# Patient Record
Sex: FEMALE | Race: WHITE | NOT HISPANIC OR LATINO | Employment: OTHER | URBAN - METROPOLITAN AREA
[De-identification: names, ages, dates, MRNs, and addresses within clinical notes are randomized per-mention and may not be internally consistent; named-entity substitution may affect disease eponyms.]

---

## 2017-01-18 RX ORDER — CYCLOBENZAPRINE HCL 10 MG
TABLET ORAL
Qty: 90 TABLET | Refills: 2 | Status: SHIPPED | OUTPATIENT
Start: 2017-01-18 | End: 2017-02-07 | Stop reason: SDUPTHER

## 2017-01-18 RX ORDER — BUPRENORPHINE 10 UG/H
PATCH, EXTENDED RELEASE TRANSDERMAL
Qty: 4 PATCH | Refills: 0 | Status: SHIPPED | OUTPATIENT
Start: 2017-01-18 | End: 2017-04-11 | Stop reason: SDUPTHER

## 2017-02-07 ENCOUNTER — OFFICE VISIT (OUTPATIENT)
Dept: PAIN MEDICINE | Facility: CLINIC | Age: 47
End: 2017-02-07

## 2017-02-07 VITALS
DIASTOLIC BLOOD PRESSURE: 92 MMHG | TEMPERATURE: 98.2 F | SYSTOLIC BLOOD PRESSURE: 136 MMHG | WEIGHT: 161.8 LBS | RESPIRATION RATE: 16 BRPM | HEART RATE: 105 BPM | HEIGHT: 68 IN | BODY MASS INDEX: 24.52 KG/M2 | OXYGEN SATURATION: 100 %

## 2017-02-07 DIAGNOSIS — G89.4 CHRONIC PAIN SYNDROME: Primary | ICD-10-CM

## 2017-02-07 DIAGNOSIS — G63 B12 NEUROPATHY (HCC): ICD-10-CM

## 2017-02-07 DIAGNOSIS — G64 DISORDER OF PERIPHERAL NERVOUS SYSTEM: ICD-10-CM

## 2017-02-07 DIAGNOSIS — E53.8 B12 NEUROPATHY (HCC): ICD-10-CM

## 2017-02-07 DIAGNOSIS — Z79.899 ENCOUNTER FOR LONG-TERM (CURRENT) USE OF MEDICATIONS: ICD-10-CM

## 2017-02-07 PROCEDURE — 99213 OFFICE O/P EST LOW 20 MIN: CPT | Performed by: NURSE PRACTITIONER

## 2017-02-07 RX ORDER — AMITRIPTYLINE HYDROCHLORIDE 150 MG/1
150 TABLET, FILM COATED ORAL NIGHTLY
Qty: 30 TABLET | Refills: 5 | Status: SHIPPED | OUTPATIENT
Start: 2017-02-07 | End: 2017-08-07 | Stop reason: SDUPTHER

## 2017-02-07 RX ORDER — CYCLOBENZAPRINE HCL 10 MG
10 TABLET ORAL 2 TIMES DAILY PRN
Qty: 60 TABLET | Refills: 1 | Status: SHIPPED | OUTPATIENT
Start: 2017-02-07 | End: 2017-04-07 | Stop reason: SDUPTHER

## 2017-02-07 NOTE — PROGRESS NOTES
CHIEF COMPLAINT    Since last visit, pt's back pain is unchanged. Her butrans patch is helping to relieve the pain.    Subjective   Ana Maria Story is a 46 y.o. female  who presents to the office for follow-up.She has a history of chronic pain related to peripheral neuropathy.  Overall pain remains stable. Has now been on Butrans 10 mcg patch for two months.  She reports that it seems to be helping a lot, it keeps the pain at a tolerable level and never gets severe.  She is able to meet her functional goals at home, and has full custody of her 5 year old grandson.      Peripheral Neuropathy   This is a chronic problem. The current episode started more than 1 year ago. The problem occurs constantly. The problem has been unchanged. Associated symptoms include arthralgias, fatigue, joint swelling, myalgias, numbness and weakness. Pertinent negatives include no chest pain, chills, congestion, coughing, diaphoresis, fever, headaches, nausea, neck pain or vomiting. Nothing (cold weather) aggravates the symptoms. She has tried NSAIDs and oral narcotics (Tramadol 50 mg 2 TID, Lyrica 200 mg TID, Flexeril at HS, Elavil 150 mg HS) for the symptoms. The treatment provided moderate relief.      PEG Assessment   What number best describes your pain on average in the past week?5  What number best describes how, during the past week, pain has interfered with your enjoyment of life?5  What number best describes how, during the past week, pain has interfered with your general activity?  5    The following portions of the patient's history were reviewed and updated as appropriate: allergies, current medications, past family history, past medical history, past social history, past surgical history and problem list.    Review of Systems   Constitutional: Positive for fatigue. Negative for activity change, appetite change, chills, diaphoresis and fever.   HENT: Negative for congestion.    Respiratory: Negative for cough, shortness of  "breath and wheezing.    Cardiovascular: Negative for chest pain and palpitations.   Gastrointestinal: Positive for constipation. Negative for bowel incontinence, diarrhea, nausea and vomiting.   Genitourinary: Negative for bladder incontinence and difficulty urinating.   Musculoskeletal: Positive for arthralgias, back pain, joint swelling and myalgias. Negative for neck pain.   Neurological: Positive for weakness and numbness. Negative for dizziness, light-headedness and headaches.   Psychiatric/Behavioral: Negative for agitation, confusion, hallucinations, sleep disturbance and suicidal ideas. The patient is not nervous/anxious.        Vitals:    02/07/17 1056   BP: 136/92   Pulse: 105   Resp: 16   Temp: 98.2 °F (36.8 °C)   SpO2: 100%   Weight: 161 lb 12.8 oz (73.4 kg)   Height: 68\" (172.7 cm)   PainSc:   5   PainLoc: Generalized       Objective   Physical Exam   Constitutional: She is oriented to person, place, and time. She appears well-developed and well-nourished. She is cooperative.   HENT:   Head: Normocephalic and atraumatic.   Nose: Nose normal.   Eyes: Conjunctivae and lids are normal.   Neck: Trachea normal.   Cardiovascular: Normal rate, regular rhythm and normal heart sounds.    Pulmonary/Chest: Effort normal and breath sounds normal.   Musculoskeletal:        Lumbar back: She exhibits tenderness and pain.   Neurological: She is alert and oriented to person, place, and time. Gait normal.   Reflex Scores:       Patellar reflexes are 2+ on the right side and 2+ on the left side.  Reported dysthesia bilateral UE and LE's   Skin: Skin is warm, dry and intact.   Psychiatric: She has a normal mood and affect. Her speech is normal and behavior is normal. Judgment and thought content normal. Cognition and memory are normal.   Nursing note and vitals reviewed.    Assessment/Plan   Ana Maria was seen today for pain.    Diagnoses and all orders for this visit:    Chronic pain syndrome    Disorder of peripheral " nervous system    B12 neuropathy    Encounter for long-term (current) use of medications    Other orders  -     amitriptyline (ELAVIL) 150 MG tablet; Take 1 tablet by mouth Every Night.  -     cyclobenzaprine (FLEXERIL) 10 MG tablet; Take 1 tablet by mouth 2 (Two) Times a Day As Needed for muscle spasms.      --- Continue Butrans.  Patient appears stable with current regimen. No adverse effects. Regarding continuation of opioids, there is no evidence of aberrant behavior or any red flags.  The patient continues with appropriate response to opioid therapy. ADL's remain intact by self.   --- The urine drug screen confirmation from 12- has been reviewed and the result is appropriate based on patient history and POLLY report  --- Follow-up 2 months          POLLY REPORT    As part of the patient's treatment plan, I am prescribing controlled substances. The patient has been made aware of appropriate use of such medications, including potential risk of somnolence, limited ability to drive and/or work safely, and the potential for dependence or overdose. It has also bee made clear that these medications are for use by this patient only, without concomitant use of alcohol or other substances unless prescribed.     Patient has completed prescribing agreement detailing terms of continued prescribing of controlled substances, including monitoring POLLY reports, urine drug screening, and pill counts if necessary. The patient is aware that inappropriate use will results in cessation of prescribing such medications.    POLLY report has been reviewed and scanned into the patient's chart.    Date of last POLLY : 2-6-2017    History and physical exam exhibit continued safe and appropriate use of controlled substances.    EMR Dragon/Transcription disclaimer:   Much of this encounter note is an electronic transcription/translation of spoken language to printed text. The electronic translation of spoken language may permit  erroneous, or at times, nonsensical words or phrases to be inadvertently transcribed; Although I have reviewed the note for such errors, some may still exist.

## 2017-04-07 RX ORDER — CYCLOBENZAPRINE HCL 10 MG
TABLET ORAL
Qty: 60 TABLET | Refills: 3 | Status: SHIPPED | OUTPATIENT
Start: 2017-04-07 | End: 2017-04-11 | Stop reason: SDUPTHER

## 2017-04-11 ENCOUNTER — OFFICE VISIT (OUTPATIENT)
Dept: PAIN MEDICINE | Facility: CLINIC | Age: 47
End: 2017-04-11

## 2017-04-11 VITALS
DIASTOLIC BLOOD PRESSURE: 92 MMHG | SYSTOLIC BLOOD PRESSURE: 161 MMHG | WEIGHT: 158.6 LBS | HEIGHT: 68 IN | TEMPERATURE: 97.3 F | BODY MASS INDEX: 24.04 KG/M2 | HEART RATE: 107 BPM | RESPIRATION RATE: 16 BRPM | OXYGEN SATURATION: 100 %

## 2017-04-11 DIAGNOSIS — G64 DISORDER OF PERIPHERAL NERVOUS SYSTEM: ICD-10-CM

## 2017-04-11 DIAGNOSIS — G89.4 CHRONIC PAIN SYNDROME: Primary | ICD-10-CM

## 2017-04-11 DIAGNOSIS — M53.3 SACROILIAC DYSFUNCTION: ICD-10-CM

## 2017-04-11 DIAGNOSIS — E53.8 B12 NEUROPATHY (HCC): ICD-10-CM

## 2017-04-11 DIAGNOSIS — G63 B12 NEUROPATHY (HCC): ICD-10-CM

## 2017-04-11 DIAGNOSIS — M46.1 SACROILIITIS (HCC): ICD-10-CM

## 2017-04-11 DIAGNOSIS — Z79.899 ENCOUNTER FOR LONG-TERM (CURRENT) USE OF MEDICATIONS: ICD-10-CM

## 2017-04-11 PROCEDURE — 99214 OFFICE O/P EST MOD 30 MIN: CPT | Performed by: NURSE PRACTITIONER

## 2017-04-11 RX ORDER — CYCLOBENZAPRINE HCL 10 MG
10 TABLET ORAL 3 TIMES DAILY PRN
Qty: 60 TABLET | Refills: 3 | Status: SHIPPED | OUTPATIENT
Start: 2017-04-11 | End: 2017-06-08 | Stop reason: SDUPTHER

## 2017-04-11 RX ORDER — BUPRENORPHINE 10 UG/H
10 PATCH TRANSDERMAL WEEKLY
Qty: 4 PATCH | Refills: 0 | Status: SHIPPED | OUTPATIENT
Start: 2017-04-11 | End: 2017-06-26 | Stop reason: SINTOL

## 2017-04-11 RX ORDER — PREGABALIN 200 MG/1
200 CAPSULE ORAL 2 TIMES DAILY
Qty: 90 CAPSULE | Refills: 1 | Status: SHIPPED | OUTPATIENT
Start: 2017-04-11 | End: 2017-04-12 | Stop reason: SDUPTHER

## 2017-04-11 NOTE — PATIENT INSTRUCTIONS
Sacroiliac Joint Dysfunction  Sacroiliac joint dysfunction is a condition that causes inflammation on one or both sides of the sacroiliac (SI) joint. The SI joint connects the lower part of the spine (sacrum) with the two upper portions of the pelvis (ilium). This condition causes deep aching or burning pain in the low back. In some cases, the pain may also spread into one or both buttocks or hips or spread down the legs.  CAUSES  This condition may be caused by:  · Pregnancy. During pregnancy, extra stress is put on the SI joints because the pelvis widens.  · Injury, such as:    Car accidents.    Sport-related injuries.    Work-related injuries.  · Having one leg that is shorter than the other.  · Conditions that affect the joints, such as:    Rheumatoid arthritis.    Gout.    Psoriatic arthritis.    Joint infection (septic arthritis).  Sometimes, the cause of SI joint dysfunction is not known.  SYMPTOMS  Symptoms of this condition include:  · Aching or burning pain in the lower back. The pain may also spread to other areas, such as:    Buttocks.    Groin.    Thighs and legs.  · Muscle spasms in or around the painful areas.  · Increased pain when standing, walking, running, stair climbing, bending, or lifting.  DIAGNOSIS  Your health care provider will do a physical exam and take your medical history. During the exam, the health care provider may move one or both of your legs to different positions to check for pain. Various tests may be done to help verify the diagnosis, including:  · Imaging tests to look for other causes of pain. These may include:    MRI.    CT scan.    Bone scan.  · Diagnostic injection. A numbing medicine is injected into the SI joint using a needle. If the pain is temporarily improved or stopped after the injection, this can indicate that SI joint dysfunction is the problem.  TREATMENT  Treatment may vary depending on the cause and severity of your condition. Treatment options may  include:  · Applying ice or heat to the lower back area. This can help to reduce pain and muscle spasms.  · Medicines to relieve pain or inflammation or to relax the muscles.  · Wearing a back brace (sacroiliac brace) to help support the joint while your back is healing.  · Physical therapy to increase muscle strength around the joint and flexibility at the joint. This may also involve learning proper body positions and ways of moving to relieve stress on the joint.  · Direct manipulation of the SI joint.  · Injections of steroid medicine into the joint in order to reduce pain and swelling.  · Radiofrequency ablation to burn away nerves that are carrying pain messages from the joint.  · Use of a device that provides electrical stimulation in order to reduce pain at the joint.  · Surgery to put in screws and plates that limit or prevent joint motion. This is rare.  HOME CARE INSTRUCTIONS  · Rest as needed. Limit your activities as directed by your health care provider.  · Take medicines only as directed by your health care provider.  · If directed, apply ice to the affected area:    Put ice in a plastic bag.    Place a towel between your skin and the bag.    Leave the ice on for 20 minutes, 2-3 times per day.  · Use a heating pad or a moist heat pack as directed by your health care provider.  · Exercise as directed by your health care provider or physical therapist.  · Keep all follow-up visits as directed by your health care provider. This is important.  SEEK MEDICAL CARE IF:  · Your pain is not controlled with medicine.  · You have a fever.  · You have increasingly severe pain.  SEEK IMMEDIATE MEDICAL CARE IF:  · You have weakness, numbness, or tingling in your legs or feet.  · You lose control of your bladder or bowel.     This information is not intended to replace advice given to you by your health care provider. Make sure you discuss any questions you have with your health care provider.     Document Released:  03/16/2010 Document Revised: 05/03/2016 Document Reviewed: 08/25/2015  [employer] Interactive Patient Education ©2016 [employer]

## 2017-04-11 NOTE — PROGRESS NOTES
"CHIEF COMPLAINT    Since last visit, pt states her back pain has increased, and her feet have been bothering her more.    Subjective   Ana Maria Story is a 46 y.o. female  who presents to the office for follow-up.She has a history of peripheral neuropathy with extremity pain and back pain. Overall, her pain pattern has been relatively unchanged since her last office visit.    Complains of pain in her feet and back. Today her pain is 6/10VAS. Pain increases with weather changes/rain/cold, walking and standing; pain decreases with medication, rest. Continues with Butrans patch 10 mcg weekly, Flexeril 10 mg 3/day, Lyrica 200 mg 3/day and Elavil 150 mg nightly. Denies any side effects from the regimen. The regimen helps decrease her pain. She states the first day she puts a patch on \"it's wonderful.\"  \"It takes the edge off so that i never get into such severe pain where I'm in tears.\" ADL's by self.     The pain in her low back is starting to worsen. It is not right in the middle, but rather on either side. Pain increases with walking, standing and prolonged sitting. Has not had SI joint injections previously.     Peripheral Neuropathy   This is a chronic (today pain is 6/10VAS) problem. The current episode started more than 1 year ago. The problem occurs constantly. Progression since onset: unchanged since last office visit. Associated symptoms include arthralgias, fatigue, headaches, joint swelling, myalgias, numbness and weakness. Pertinent negatives include no chest pain, chills, congestion, coughing, diaphoresis, fever, nausea, neck pain or vomiting. Nothing (cold weather) aggravates the symptoms. She has tried NSAIDs and oral narcotics (Butrans 10 mcg patch, Lyrica 200 mg TID, Flexeril at HS, Elavil 150 mg HS) for the symptoms. The treatment provided moderate relief.   Back Pain   This is a chronic problem. The current episode started more than 1 year ago. The problem occurs 2 to 4 times per day. The problem has " been gradually worsening since onset. The pain is present in the lumbar spine, sacro-iliac and thoracic spine. The pain is at a severity of 6/10. The pain is moderate. Associated symptoms include headaches, numbness and weakness. Pertinent negatives include no bladder incontinence, bowel incontinence, chest pain or fever. She has tried analgesics and muscle relaxant for the symptoms. The treatment provided moderate relief.      PEG Assessment   What number best describes your pain on average in the past week?6  What number best describes how, during the past week, pain has interfered with your enjoyment of life?5  What number best describes how, during the past week, pain has interfered with your general activity?  5    The following portions of the patient's history were reviewed and updated as appropriate: allergies, current medications, past family history, past medical history, past social history, past surgical history and problem list.    Review of Systems   Constitutional: Positive for fatigue. Negative for activity change, appetite change, chills, diaphoresis and fever.   HENT: Negative for congestion.    Respiratory: Negative for cough, shortness of breath and wheezing.    Cardiovascular: Negative for chest pain and palpitations.   Gastrointestinal: Negative for bowel incontinence, constipation, diarrhea, nausea and vomiting.   Genitourinary: Negative for bladder incontinence and difficulty urinating.   Musculoskeletal: Positive for arthralgias, back pain, joint swelling and myalgias. Negative for neck pain.   Neurological: Positive for weakness, numbness and headaches. Negative for dizziness and light-headedness.   Psychiatric/Behavioral: Positive for sleep disturbance. Negative for agitation, confusion, hallucinations and suicidal ideas. The patient is not nervous/anxious.        Vitals:    04/11/17 1008   BP: 161/92   Pulse: 107   Resp: 16   Temp: 97.3 °F (36.3 °C)   SpO2: 100%   Weight: 158 lb 9.6 oz  "(71.9 kg)   Height: 68\" (172.7 cm)   PainSc:   6   PainLoc: Back     Objective   Physical Exam   Constitutional: She is oriented to person, place, and time. She appears well-developed and well-nourished. She is cooperative.   HENT:   Head: Normocephalic and atraumatic.   Nose: Nose normal.   Eyes: Conjunctivae and lids are normal.   Neck: Trachea normal.   Cardiovascular: Normal rate, regular rhythm and normal heart sounds.    Pulmonary/Chest: Effort normal and breath sounds normal.   Musculoskeletal:        Lumbar back: She exhibits tenderness, bony tenderness and pain.   Moderate tenderness to palpation of bilateral SI joints    VENKAT +    Negative SLR bilaterally    Neurological: She is alert and oriented to person, place, and time. Gait normal.   Reflex Scores:       Patellar reflexes are 2+ on the right side and 2+ on the left side.  Reported dysthesia bilateral UE and LE's   Skin: Skin is warm, dry and intact.   Psychiatric: She has a normal mood and affect. Her speech is normal and behavior is normal. Judgment and thought content normal. Cognition and memory are normal.   Nursing note and vitals reviewed.          Assessment/Plan   Ana Maria was seen today for back pain.    Diagnoses and all orders for this visit:    Chronic pain syndrome    Disorder of peripheral nervous system    B12 neuropathy    Encounter for long-term (current) use of medications    Sacroiliac dysfunction  -     Case Request    Sacroiliitis  -     Case Request    Other orders  -     cyclobenzaprine (FLEXERIL) 10 MG tablet; Take 1 tablet by mouth 3 (Three) Times a Day As Needed for Muscle Spasms.  -     pregabalin (LYRICA) 200 MG capsule; Take 1 capsule by mouth 2 (Two) Times a Day.  -     Buprenorphine (BUTRANS) 10 MCG/HR patch weekly; Place 10 mcg on the skin 1 (One) Time Per Week.      --- The urine drug screen confirmation from 12-13-16 has been reviewed and the result is appropriate based on patient history and POLLY report  --- " Refill Flexeril and lyrica. Patient declines refill of Butrans. Patient appears stable with current regimen. No adverse effects. Regarding continuation of opioids, there is no evidence of aberrant behavior or any red flags.  The patient continues with appropriate response to opioid therapy. ADL's remain intact by self.   --- Bilateral Si joint. No blood thinners. Reviewed the procedure at length with the patient.  Included in the review was expectations, complications, risk and benefits.The procedure was described in detail and the risks, benefits and alternatives were discussed with the patient (including but not limited to: bleeding, infection, nerve damage, worsening of pain, inability to perform injection, paralysis, seizures, and death) who agreed to proceed.  Discussed the potential for sedation if warranted/wanted.  Questions were answered and in a way the patient could understand.  Patient verbalized understanding and wishes to proceed.  This intervention will be ordered.  --- Follow-up 2 months or sooner if needed.       POLLY REPORT    As part of the patient's treatment plan, I am prescribing controlled substances. The patient has been made aware of appropriate use of such medications, including potential risk of somnolence, limited ability to drive and/or work safely, and the potential for dependence or overdose. It has also bee made clear that these medications are for use by this patient only, without concomitant use of alcohol or other substances unless prescribed.     Patient has completed prescribing agreement detailing terms of continued prescribing of controlled substances, including monitoring POLLY reports, urine drug screening, and pill counts if necessary. The patient is aware that inappropriate use will results in cessation of prescribing such medications.    POLLY report has been reviewed and scanned into the patient's chart.    Date of last POLLY : 4-10-17    History and physical exam  exhibit continued safe and appropriate use of controlled substances.      EMR Dragon/Transcription disclaimer:   Much of this encounter note is an electronic transcription/translation of spoken language to printed text. The electronic translation of spoken language may permit erroneous, or at times, nonsensical words or phrases to be inadvertently transcribed; Although I have reviewed the note for such errors, some may still exist.

## 2017-04-12 RX ORDER — PREGABALIN 200 MG/1
200 CAPSULE ORAL 3 TIMES DAILY
Qty: 90 CAPSULE | Refills: 1 | OUTPATIENT
Start: 2017-04-12 | End: 2017-06-08 | Stop reason: SDUPTHER

## 2017-04-25 ENCOUNTER — OUTSIDE FACILITY SERVICE (OUTPATIENT)
Dept: PAIN MEDICINE | Facility: CLINIC | Age: 47
End: 2017-04-25

## 2017-04-25 PROCEDURE — 27096 INJECT SACROILIAC JOINT: CPT | Performed by: ANESTHESIOLOGY

## 2017-05-23 ENCOUNTER — OUTSIDE FACILITY SERVICE (OUTPATIENT)
Dept: PAIN MEDICINE | Facility: CLINIC | Age: 47
End: 2017-05-23

## 2017-05-23 PROCEDURE — 27096 INJECT SACROILIAC JOINT: CPT | Performed by: ANESTHESIOLOGY

## 2017-06-06 ENCOUNTER — CONVERSION ENCOUNTER (OUTPATIENT)
Dept: GENERAL RADIOLOGY | Facility: HOSPITAL | Age: 47
End: 2017-06-06

## 2017-06-08 ENCOUNTER — OFFICE VISIT (OUTPATIENT)
Dept: PAIN MEDICINE | Facility: CLINIC | Age: 47
End: 2017-06-08

## 2017-06-08 VITALS
DIASTOLIC BLOOD PRESSURE: 88 MMHG | BODY MASS INDEX: 23.49 KG/M2 | WEIGHT: 155 LBS | HEIGHT: 68 IN | RESPIRATION RATE: 16 BRPM | TEMPERATURE: 97.4 F | OXYGEN SATURATION: 100 % | SYSTOLIC BLOOD PRESSURE: 152 MMHG | HEART RATE: 83 BPM

## 2017-06-08 DIAGNOSIS — E53.8 B12 NEUROPATHY (HCC): ICD-10-CM

## 2017-06-08 DIAGNOSIS — G64 DISORDER OF PERIPHERAL NERVOUS SYSTEM: ICD-10-CM

## 2017-06-08 DIAGNOSIS — M53.3 SACROILIAC JOINT DYSFUNCTION OF BOTH SIDES: ICD-10-CM

## 2017-06-08 DIAGNOSIS — G89.4 CHRONIC PAIN SYNDROME: Primary | ICD-10-CM

## 2017-06-08 DIAGNOSIS — Z79.899 ENCOUNTER FOR LONG-TERM (CURRENT) USE OF MEDICATIONS: ICD-10-CM

## 2017-06-08 DIAGNOSIS — G63 B12 NEUROPATHY (HCC): ICD-10-CM

## 2017-06-08 PROCEDURE — 99213 OFFICE O/P EST LOW 20 MIN: CPT | Performed by: NURSE PRACTITIONER

## 2017-06-08 RX ORDER — PREGABALIN 200 MG/1
200 CAPSULE ORAL 3 TIMES DAILY
Qty: 90 CAPSULE | Refills: 1 | Status: SHIPPED | OUTPATIENT
Start: 2017-06-08 | End: 2017-07-25 | Stop reason: SDUPTHER

## 2017-06-08 RX ORDER — CYCLOBENZAPRINE HCL 10 MG
10 TABLET ORAL 3 TIMES DAILY PRN
Qty: 60 TABLET | Refills: 3 | Status: SHIPPED | OUTPATIENT
Start: 2017-06-08 | End: 2017-07-25 | Stop reason: SDUPTHER

## 2017-06-08 NOTE — PROGRESS NOTES
CHIEF COMPLAINT  Pt states LBP is 50% reduced since 5/23/17 Bilat. S. I. Joint injections, #2    Subjective   Ana Maria Story is a 46 y.o. female  who presents to the office for follow-up of procedure.  She completed a bilateral SI joint injection on  4-25-17 and 5-23-17 performed by Dr. Plaza for management of low back pain, SI joint pain. Patient reports 50% relief from the procedure.  She states that the rest of her back is hurting and having some severe pain in her feet, seeing podiatrist next week.      She continues to c/o low back and bilateral leg/foot pain.  Continues with Butrans patch 10 mcg weekly, Flexeril 10 mg 3/day, Lyrica 200 mg 3/day and Elavil 150 mg nightly. Denies any side effects from the regimen. The regimen helps decrease her pain moderately.  She also uses some type of herbal topical cream.  She has not tried compounded pain cream.      Peripheral Neuropathy   This is a chronic problem. The current episode started more than 1 year ago. The problem occurs constantly. Progression since onset: unchanged since last office visit. Associated symptoms include arthralgias, fatigue, headaches, myalgias, numbness (bilat. legs,hands) and weakness (bilat. lgs). Pertinent negatives include no chest pain, chills, congestion, coughing, diaphoresis, fever, joint swelling, nausea, neck pain or vomiting. Nothing (cold weather) aggravates the symptoms. She has tried NSAIDs and oral narcotics (Butrans 10 mcg patch, Lyrica 200 mg TID, Flexeril at HS, Elavil 150 mg HS) for the symptoms. The treatment provided moderate relief.   Back Pain   This is a chronic problem. The current episode started more than 1 year ago. The problem occurs 2 to 4 times per day. The problem has been gradually worsening since onset. The pain is present in the lumbar spine, sacro-iliac and thoracic spine. The pain is at a severity of 2/10. The pain is moderate. Associated symptoms include headaches, numbness (bilat. legs,hands) and  "weakness (bilat. lgs). Pertinent negatives include no bladder incontinence, bowel incontinence, chest pain, dysuria or fever. She has tried analgesics and muscle relaxant for the symptoms. The treatment provided moderate relief.      PEG Assessment   What number best describes your pain on average in the past week?5  What number best describes how, during the past week, pain has interfered with your enjoyment of life?5  What number best describes how, during the past week, pain has interfered with your general activity?  5    The following portions of the patient's history were reviewed and updated as appropriate: allergies, current medications, past family history, past medical history, past social history, past surgical history and problem list.    Review of Systems   Constitutional: Positive for fatigue. Negative for activity change, appetite change, chills, diaphoresis and fever.   HENT: Negative for congestion.    Respiratory: Positive for apnea. Negative for cough, chest tightness, shortness of breath and wheezing.    Cardiovascular: Negative for chest pain and palpitations.   Gastrointestinal: Negative for bowel incontinence, constipation, diarrhea, nausea and vomiting.   Genitourinary: Negative for bladder incontinence, difficulty urinating and dysuria.   Musculoskeletal: Positive for arthralgias, back pain and myalgias. Negative for joint swelling and neck pain.   Neurological: Positive for weakness (bilat. lgs), numbness (bilat. legs,hands) and headaches. Negative for dizziness and light-headedness.   Psychiatric/Behavioral: Positive for sleep disturbance. Negative for agitation, confusion, hallucinations and suicidal ideas. The patient is not nervous/anxious.      Vitals:    06/08/17 1034   BP: 152/88   Pulse: 83   Resp: 16   Temp: 97.4 °F (36.3 °C)   SpO2: 100%   Weight: 155 lb (70.3 kg)   Height: 68\" (172.7 cm)   PainSc: 2  Comment: LBP bilaterally ranges from 2-8/10   PainLoc: Back     Objective "   Physical Exam   Constitutional: She is oriented to person, place, and time. She appears well-developed and well-nourished. She is cooperative.   HENT:   Head: Normocephalic and atraumatic.   Nose: Nose normal.   Eyes: Conjunctivae and lids are normal.   Neck: Trachea normal.   Cardiovascular: Normal rate.    Pulmonary/Chest: Effort normal. No respiratory distress.   Musculoskeletal:        Thoracic back: She exhibits tenderness and spasm.        Lumbar back: She exhibits tenderness.   Neurological: She is alert and oriented to person, place, and time. Gait normal.   Reported dysthesia bilateral UE and LE's   Skin: Skin is warm, dry and intact.   Psychiatric: She has a normal mood and affect. Her speech is normal and behavior is normal. Judgment and thought content normal. Cognition and memory are normal.   Nursing note and vitals reviewed.    Assessment/Plan   Ana Maria was seen today for back pain.    Diagnoses and all orders for this visit:    Chronic pain syndrome    Disorder of peripheral nervous system    B12 neuropathy    Encounter for long-term (current) use of medications    Sacroiliac joint dysfunction of both sides    Other orders  -     pregabalin (LYRICA) 200 MG capsule; Take 1 capsule by mouth 3 (Three) Times a Day.  -     cyclobenzaprine (FLEXERIL) 10 MG tablet; Take 1 tablet by mouth 3 (Three) Times a Day As Needed for Muscle Spasms.      --- Trial of a compounded pain cream  --- The urine drug screen confirmation from 12-13-16 has been reviewed and the result is appropriate based on patient history and POLLY report  --- Refill Flexeril and lyrica. Patient declines refill of Butrans. Patient appears stable with current regimen. No adverse effects. Regarding continuation of opioids, there is no evidence of aberrant behavior or any red flags. The patient continues with appropriate response to opioid therapy. ADL's remain intact by self.   --- Follow-up 2 months          POLLY REPORT  As part of the  patient's treatment plan, I am prescribing controlled substances. The patient has been made aware of appropriate use of such medications, including potential risk of somnolence, limited ability to drive and/or work safely, and the potential for dependence or overdose. It has also bee made clear that these medications are for use by this patient only, without concomitant use of alcohol or other substances unless prescribed.     Patient has completed prescribing agreement detailing terms of continued prescribing of controlled substances, including monitoring POLLY reports, urine drug screening, and pill counts if necessary. The patient is aware that inappropriate use will results in cessation of prescribing such medications.    POLLY report has been reviewed and scanned into the patient's chart.    Date of last POLLY : 6-7-2017    History and physical exam exhibit continued safe and appropriate use of controlled substances.     EMR Dragon/Transcription disclaimer:   Much of this encounter note is an electronic transcription/translation of spoken language to printed text. The electronic translation of spoken language may permit erroneous, or at times, nonsensical words or phrases to be inadvertently transcribed; Although I have reviewed the note for such errors, some may still exist.

## 2017-06-21 ENCOUNTER — TELEPHONE (OUTPATIENT)
Dept: PAIN MEDICINE | Facility: CLINIC | Age: 47
End: 2017-06-21

## 2017-06-21 NOTE — TELEPHONE ENCOUNTER
Patient called and states that she is having a reaction to the Butrans batch and is almost like a burn on her skin. She is going to send a picture of it through [employer].

## 2017-06-25 NOTE — TELEPHONE ENCOUNTER
Take off patch. Try a different patch in a different area. If no improvement, needs appointment. Thanks. catia

## 2017-06-26 ENCOUNTER — OFFICE VISIT (OUTPATIENT)
Dept: PAIN MEDICINE | Facility: CLINIC | Age: 47
End: 2017-06-26

## 2017-06-26 VITALS
DIASTOLIC BLOOD PRESSURE: 86 MMHG | HEIGHT: 68 IN | OXYGEN SATURATION: 99 % | BODY MASS INDEX: 23.46 KG/M2 | SYSTOLIC BLOOD PRESSURE: 172 MMHG | WEIGHT: 154.8 LBS | RESPIRATION RATE: 18 BRPM | HEART RATE: 107 BPM | TEMPERATURE: 97.7 F

## 2017-06-26 DIAGNOSIS — G89.4 CHRONIC PAIN SYNDROME: Primary | ICD-10-CM

## 2017-06-26 DIAGNOSIS — G63 B12 NEUROPATHY (HCC): ICD-10-CM

## 2017-06-26 DIAGNOSIS — Z79.899 ENCOUNTER FOR LONG-TERM (CURRENT) USE OF MEDICATIONS: ICD-10-CM

## 2017-06-26 DIAGNOSIS — E53.8 B12 NEUROPATHY (HCC): ICD-10-CM

## 2017-06-26 DIAGNOSIS — G64 DISORDER OF PERIPHERAL NERVOUS SYSTEM: ICD-10-CM

## 2017-06-26 PROCEDURE — 99214 OFFICE O/P EST MOD 30 MIN: CPT | Performed by: NURSE PRACTITIONER

## 2017-06-26 RX ORDER — RANITIDINE 150 MG/1
TABLET ORAL
Refills: 0 | COMMUNITY
Start: 2017-04-19 | End: 2019-10-08

## 2017-06-26 RX ORDER — TRAMADOL HYDROCHLORIDE 50 MG/1
100 TABLET ORAL EVERY 8 HOURS PRN
Qty: 180 TABLET | Refills: 0 | Status: SHIPPED | OUTPATIENT
Start: 2017-06-26 | End: 2017-07-25 | Stop reason: SDUPTHER

## 2017-06-26 RX ORDER — OMEPRAZOLE 20 MG/1
CAPSULE, DELAYED RELEASE ORAL
Refills: 0 | COMMUNITY
Start: 2017-06-11 | End: 2019-05-07 | Stop reason: SDUPTHER

## 2017-06-26 NOTE — PROGRESS NOTES
"CHIEF COMPLAINT  Ms. Story states that she started having a reaction to her Butrans patch last Wednesday. She states that she got a rash and her skin felt raw on the area her Butrans patch was located.    Subjective   Ana Maria Story is a 46 y.o. female  who presents to the office for follow-up.She has a history of chronic pain related to peripheral neuropathy and LE pain. She also has a history of low back pain. She had previously been on Butrans but started having burning and blistering under the patches. Had to take this off. Has been without pain medications for a few days.    Complains of pain in her legs. Today her pain is 7/10VAS. Describes the pain as continuous in her legs. \"I honestly don't really feel the back right now because the legs and feet are hurting so bad.\"  Has also been diagnosed with plantar fibromas. No surgical intervention at this time, will monitor. Continues with Lyrica 200 mg TID.  Denies any side effects from this. ADL's by self.     Has previously been on Tramadol. Denied any side effects from this. Is willing to re-try this when we stop Butrans.     Peripheral Neuropathy   This is a chronic (today her LE pain is 7/10VAS.) problem. The current episode started more than 1 year ago. The problem occurs constantly. Progression since onset: unchanged since last office visit. Associated symptoms include arthralgias, fatigue, headaches, myalgias, numbness (bilateral legs and feet) and weakness (bilateral legs). Pertinent negatives include no chest pain, chills, congestion, coughing, diaphoresis, fever, joint swelling, nausea, neck pain or vomiting. Nothing (cold weather) aggravates the symptoms. She has tried NSAIDs and oral narcotics (Butrans 10 mcg patch, Lyrica 200 mg TID, Flexeril at HS, Elavil 150 mg HS) for the symptoms. The treatment provided moderate relief.   Back Pain   This is a chronic problem. The current episode started more than 1 year ago. The problem occurs 2 to 4 times per " day. The problem has been gradually worsening since onset. The pain is present in the lumbar spine, sacro-iliac and thoracic spine. The pain is moderate. Associated symptoms include headaches, numbness (bilateral legs and feet) and weakness (bilateral legs). Pertinent negatives include no bladder incontinence, bowel incontinence, chest pain, dysuria or fever. She has tried analgesics and muscle relaxant for the symptoms. The treatment provided moderate relief.      PEG Assessment   What number best describes your pain on average in the past week?8  What number best describes how, during the past week, pain has interfered with your enjoyment of life?8  What number best describes how, during the past week, pain has interfered with your general activity?  8    The following portions of the patient's history were reviewed and updated as appropriate: allergies, current medications, past family history, past medical history, past social history, past surgical history and problem list.    Review of Systems   Constitutional: Positive for fatigue. Negative for chills, diaphoresis and fever.   HENT: Negative for congestion.    Eyes: Negative for visual disturbance.   Respiratory: Negative for cough, shortness of breath and wheezing.    Cardiovascular: Negative.  Negative for chest pain.   Gastrointestinal: Positive for constipation. Negative for bowel incontinence, diarrhea, nausea and vomiting.   Genitourinary: Negative for bladder incontinence, difficulty urinating and dysuria.   Musculoskeletal: Positive for arthralgias, back pain and myalgias. Negative for joint swelling and neck pain.   Neurological: Positive for weakness (bilateral legs), numbness (bilateral legs and feet) and headaches.   Psychiatric/Behavioral: Positive for sleep disturbance. Negative for suicidal ideas. The patient is not nervous/anxious.        Vitals:    06/26/17 1049   BP: 172/86   Pulse: 107   Resp: 18   Temp: 97.7 °F (36.5 °C)   SpO2: 99%  "  Weight: 154 lb 12.8 oz (70.2 kg)   Height: 68\" (172.7 cm)   PainSc:   7   PainLoc: Leg     Objective   Physical Exam   Constitutional: She is oriented to person, place, and time. She appears well-developed and well-nourished. She is cooperative.   HENT:   Head: Normocephalic and atraumatic.   Nose: Nose normal.   Eyes: Conjunctivae and lids are normal.   Neck: Trachea normal.   Cardiovascular: Normal rate, regular rhythm and normal heart sounds.    Pulmonary/Chest: Effort normal and breath sounds normal.   Musculoskeletal:        Lumbar back: She exhibits tenderness, bony tenderness and pain.   Neurological: She is alert and oriented to person, place, and time. Gait normal.   Reflex Scores:       Patellar reflexes are 2+ on the right side and 2+ on the left side.  Reported dysthesia bilateral UE and LE's   Skin: Skin is warm, dry and intact.   Psychiatric: She has a normal mood and affect. Her speech is normal and behavior is normal. Judgment and thought content normal. Cognition and memory are normal.   Nursing note and vitals reviewed.      Assessment/Plan   Ana Maria was seen today for back pain and extremity pain.    Diagnoses and all orders for this visit:    Chronic pain syndrome    B12 neuropathy    Disorder of peripheral nervous system    Encounter for long-term (current) use of medications    Other orders  -     traMADol (ULTRAM) 50 MG tablet; Take 2 tablets by mouth Every 8 (Eight) Hours As Needed for Moderate Pain (4-6).      --- The urine drug screen confirmation from 12-13-16 has been reviewed and the result is appropriate based on patient history and POLLY report  --- Discontinue Butrans secondary to side effects.  --- Trial of Tramadol 50 mg 2 q8hrs PRN. Discussed medication with the patient.  Included in this discussion was the potential for side effects and adverse events.  Patient verbalized understanding and wished to proceed.  Prescription will be given to patient.  --- Follow-up 1 month or " sooner if needed.         POLLY REPORT    As part of the patient's treatment plan, I am prescribing controlled substances. The patient has been made aware of appropriate use of such medications, including potential risk of somnolence, limited ability to drive and/or work safely, and the potential for dependence or overdose. It has also bee made clear that these medications are for use by this patient only, without concomitant use of alcohol or other substances unless prescribed.     Patient has completed prescribing agreement detailing terms of continued prescribing of controlled substances, including monitoring POLLY reports, urine drug screening, and pill counts if necessary. The patient is aware that inappropriate use will results in cessation of prescribing such medications.    POLLY report has been reviewed and scanned into the patient's chart.    Date of last POLLY : 6-26-17    History and physical exam exhibit continued safe and appropriate use of controlled substances.      EMR Dragon/Transcription disclaimer:   Much of this encounter note is an electronic transcription/translation of spoken language to printed text. The electronic translation of spoken language may permit erroneous, or at times, nonsensical words or phrases to be inadvertently transcribed; Although I have reviewed the note for such errors, some may still exist.

## 2017-07-25 ENCOUNTER — OFFICE VISIT (OUTPATIENT)
Dept: PAIN MEDICINE | Facility: CLINIC | Age: 47
End: 2017-07-25

## 2017-07-25 VITALS
WEIGHT: 155 LBS | BODY MASS INDEX: 23.49 KG/M2 | DIASTOLIC BLOOD PRESSURE: 89 MMHG | HEART RATE: 94 BPM | TEMPERATURE: 97.9 F | RESPIRATION RATE: 16 BRPM | OXYGEN SATURATION: 97 % | SYSTOLIC BLOOD PRESSURE: 147 MMHG | HEIGHT: 68 IN

## 2017-07-25 DIAGNOSIS — G89.4 CHRONIC PAIN SYNDROME: Primary | ICD-10-CM

## 2017-07-25 DIAGNOSIS — Z79.899 ENCOUNTER FOR LONG-TERM (CURRENT) USE OF MEDICATIONS: ICD-10-CM

## 2017-07-25 DIAGNOSIS — G63 B12 NEUROPATHY (HCC): ICD-10-CM

## 2017-07-25 DIAGNOSIS — E53.8 B12 NEUROPATHY (HCC): ICD-10-CM

## 2017-07-25 DIAGNOSIS — G64 DISORDER OF PERIPHERAL NERVOUS SYSTEM: ICD-10-CM

## 2017-07-25 DIAGNOSIS — M79.606 PAIN OF LOWER EXTREMITY, UNSPECIFIED LATERALITY: ICD-10-CM

## 2017-07-25 PROBLEM — M79.609 PAIN IN EXTREMITY: Status: ACTIVE | Noted: 2017-07-25

## 2017-07-25 LAB
POC AMPHETAMINES: NEGATIVE
POC BARBITURATES: NEGATIVE
POC BENZODIAZEPHINES: NEGATIVE
POC COCAINE: NEGATIVE
POC METHADONE: NEGATIVE
POC METHAMPHETAMINE SCREEN URINE: NEGATIVE
POC OPIATES: NEGATIVE
POC OXYCODONE: NEGATIVE
POC PHENCYCLIDINE: NEGATIVE
POC PROPOXYPHENE: NEGATIVE
POC THC: NEGATIVE
POC TRICYCLIC ANTIDEPRESSANTS: POSITIVE

## 2017-07-25 PROCEDURE — 99213 OFFICE O/P EST LOW 20 MIN: CPT | Performed by: NURSE PRACTITIONER

## 2017-07-25 PROCEDURE — 80305 DRUG TEST PRSMV DIR OPT OBS: CPT | Performed by: NURSE PRACTITIONER

## 2017-07-25 RX ORDER — CYCLOBENZAPRINE HCL 10 MG
10 TABLET ORAL 3 TIMES DAILY PRN
Qty: 60 TABLET | Refills: 3 | Status: SHIPPED | OUTPATIENT
Start: 2017-07-25 | End: 2017-11-22 | Stop reason: SDUPTHER

## 2017-07-25 RX ORDER — PREGABALIN 200 MG/1
200 CAPSULE ORAL 3 TIMES DAILY
Qty: 90 CAPSULE | Refills: 1 | Status: SHIPPED | OUTPATIENT
Start: 2017-07-25 | End: 2017-09-25 | Stop reason: SDUPTHER

## 2017-07-25 RX ORDER — TRAMADOL HYDROCHLORIDE 50 MG/1
100 TABLET ORAL EVERY 8 HOURS PRN
Qty: 180 TABLET | Refills: 1 | Status: SHIPPED | OUTPATIENT
Start: 2017-07-25 | End: 2017-09-25 | Stop reason: SDUPTHER

## 2017-07-25 NOTE — PROGRESS NOTES
CHIEF COMPLAINT    Since last visit on 6-26-17, pt states her back and leg pain is unchanged. She is having increased pain in left foot due to plantar fibromas. She is going to see podiatrist soon about it.     Subjective   Ana Maria Story is a 46 y.o. female  who presents to the office for follow-up.She has a history of extremity pain due to neuropathy. Also has some low back pain.  AT her last office visit, she eligio changed from Butrans to Tramadol due to side effects from Butrans(skin irritation,rash). Her skin rash is improved.  She notes her pain is not as well improved with Tramadol but could not tolerate side effects of Butrans.    Complains of pain in her legs and back. Today her pain is 5/10VAS. Describes the pain as continuous. Continues with Tramadol 50 mg 2 TID, Lyrica 200 mg TID and Flexeril 10 mg 2/day. Denies any side effects from the regimen. The regimen helps decrease her pain moderately. ADL's by self.    May be obtaining custody of grandson.    Peripheral Neuropathy   This is a chronic (today her LE pain is 5/10VAS.) problem. The current episode started more than 1 year ago. The problem occurs constantly. Progression since onset: unchanged since last office visit. Associated symptoms include arthralgias, fatigue, headaches (occasional), myalgias, numbness (bilateral legs and feet) and weakness (bilateral legs). Pertinent negatives include no chest pain, chills, congestion, coughing, diaphoresis, fever, joint swelling, nausea, neck pain or vomiting. Nothing (cold weather) aggravates the symptoms. She has tried NSAIDs and oral narcotics (Butrans 10 mcg patch, Lyrica 200 mg TID, Flexeril at HS, Elavil 150 mg HS) for the symptoms. The treatment provided moderate relief.   Back Pain   This is a chronic problem. The current episode started more than 1 year ago. The problem occurs 2 to 4 times per day. The problem has been gradually worsening since onset. The pain is present in the lumbar spine,  sacro-iliac and thoracic spine. The pain is at a severity of 5/10. The pain is moderate. Associated symptoms include headaches (occasional), numbness (bilateral legs and feet) and weakness (bilateral legs). Pertinent negatives include no bladder incontinence, bowel incontinence, chest pain, dysuria or fever. She has tried analgesics and muscle relaxant for the symptoms. The treatment provided moderate relief.      PEG Assessment   What number best describes your pain on average in the past week?5  What number best describes how, during the past week, pain has interfered with your enjoyment of life?5  What number best describes how, during the past week, pain has interfered with your general activity?  5    The following portions of the patient's history were reviewed and updated as appropriate: allergies, current medications, past family history, past medical history, past social history, past surgical history and problem list.    Review of Systems   Constitutional: Positive for fatigue. Negative for chills, diaphoresis and fever.   HENT: Negative for congestion.    Eyes: Negative for visual disturbance.   Respiratory: Negative for cough, shortness of breath and wheezing.    Cardiovascular: Negative.  Negative for chest pain.   Gastrointestinal: Positive for constipation. Negative for bowel incontinence, diarrhea, nausea and vomiting.   Genitourinary: Negative for bladder incontinence, difficulty urinating and dysuria.   Musculoskeletal: Positive for arthralgias, back pain and myalgias. Negative for joint swelling and neck pain.   Neurological: Positive for weakness (bilateral legs), numbness (bilateral legs and feet) and headaches (occasional). Negative for dizziness and light-headedness.   Psychiatric/Behavioral: Positive for sleep disturbance. Negative for agitation, confusion, hallucinations and suicidal ideas. The patient is not nervous/anxious.        Vitals:    07/25/17 1053   BP: 147/89   Pulse: 94   Resp:  "16   Temp: 97.9 °F (36.6 °C)   SpO2: 97%   Weight: 155 lb (70.3 kg)   Height: 68\" (172.7 cm)   PainSc:   5   PainLoc: Back  Comment: bilateral legs, l>r       Objective   Physical Exam   Constitutional: She is oriented to person, place, and time. She appears well-developed and well-nourished. She is cooperative.   HENT:   Head: Normocephalic and atraumatic.   Nose: Nose normal.   Eyes: Conjunctivae and lids are normal.   Neck: Trachea normal.   Cardiovascular: Normal rate, regular rhythm and normal heart sounds.    Pulmonary/Chest: Effort normal and breath sounds normal.   Musculoskeletal:        Lumbar back: She exhibits tenderness, bony tenderness and pain.   Neurological: She is alert and oriented to person, place, and time. Gait normal.   Reflex Scores:       Patellar reflexes are 2+ on the right side and 2+ on the left side.  Reported dysthesia bilateral UE and LE's   Skin: Skin is warm, dry and intact.   Psychiatric: She has a normal mood and affect. Her speech is normal and behavior is normal. Judgment and thought content normal. Cognition and memory are normal.   Nursing note and vitals reviewed.      Assessment/Plan   Ana Maria was seen today for back pain and extremity pain.    Diagnoses and all orders for this visit:    Chronic pain syndrome  -     Urine Drug Screen Confirmation  -     POC Urine Drug Screen, Triage    Disorder of peripheral nervous system  -     Urine Drug Screen Confirmation  -     POC Urine Drug Screen, Triage    B12 neuropathy  -     Urine Drug Screen Confirmation  -     POC Urine Drug Screen, Triage    Pain of lower extremity, unspecified laterality  -     Urine Drug Screen Confirmation  -     POC Urine Drug Screen, Triage    Encounter for long-term (current) use of medications  -     Urine Drug Screen Confirmation  -     POC Urine Drug Screen, Triage    Other orders  -     traMADol (ULTRAM) 50 MG tablet; Take 2 tablets by mouth Every 8 (Eight) Hours As Needed for Moderate Pain " (4-6).  -     cyclobenzaprine (FLEXERIL) 10 MG tablet; Take 1 tablet by mouth 3 (Three) Times a Day As Needed for Muscle Spasms.  -     pregabalin (LYRICA) 200 MG capsule; Take 1 capsule by mouth 3 (Three) Times a Day.      --- Routine UDS in office today as part of monitoring requirements for controlled substances.  The specimen was viewed and the immunoassay result reviewed and is +TCA(APPROPRIATE).  [facility] confirmation.     --- Refill Tramadol, Lyrica and Flexeril with 1 additional refill  --- Follow-up 2 months or sooner if needed.       POLLY REPORT    As part of the patient's treatment plan, I am prescribing controlled substances. The patient has been made aware of appropriate use of such medications, including potential risk of somnolence, limited ability to drive and/or work safely, and the potential for dependence or overdose. It has also bee made clear that these medications are for use by this patient only, without concomitant use of alcohol or other substances unless prescribed.     Patient has completed prescribing agreement detailing terms of continued prescribing of controlled substances, including monitoring POLLY reports, urine drug screening, and pill counts if necessary. The patient is aware that inappropriate use will results in cessation of prescribing such medications.    POLLY report has been reviewed and scanned into the patient's chart.    Date of last POLLY : 7-22-17    History and physical exam exhibit continued safe and appropriate use of controlled substances.      EMR Dragon/Transcription disclaimer:   Much of this encounter note is an electronic transcription/translation of spoken language to printed text. The electronic translation of spoken language may permit erroneous, or at times, nonsensical words or phrases to be inadvertently transcribed; Although I have reviewed the note for such errors, some may still exist.

## 2017-08-07 RX ORDER — AMITRIPTYLINE HYDROCHLORIDE 150 MG/1
TABLET, FILM COATED ORAL
Qty: 30 TABLET | Refills: 5 | Status: SHIPPED | OUTPATIENT
Start: 2017-08-07 | End: 2017-09-25 | Stop reason: SDUPTHER

## 2017-09-25 ENCOUNTER — OFFICE VISIT (OUTPATIENT)
Dept: PAIN MEDICINE | Facility: CLINIC | Age: 47
End: 2017-09-25

## 2017-09-25 VITALS
TEMPERATURE: 97.8 F | BODY MASS INDEX: 23.01 KG/M2 | WEIGHT: 151.8 LBS | HEART RATE: 101 BPM | DIASTOLIC BLOOD PRESSURE: 91 MMHG | HEIGHT: 68 IN | OXYGEN SATURATION: 97 % | SYSTOLIC BLOOD PRESSURE: 124 MMHG | RESPIRATION RATE: 16 BRPM

## 2017-09-25 DIAGNOSIS — M53.3 SACROILIAC JOINT DYSFUNCTION OF BOTH SIDES: ICD-10-CM

## 2017-09-25 DIAGNOSIS — Z79.899 ENCOUNTER FOR LONG-TERM (CURRENT) USE OF MEDICATIONS: ICD-10-CM

## 2017-09-25 DIAGNOSIS — M79.606 PAIN OF LOWER EXTREMITY, UNSPECIFIED LATERALITY: ICD-10-CM

## 2017-09-25 DIAGNOSIS — E53.8 B12 NEUROPATHY (HCC): ICD-10-CM

## 2017-09-25 DIAGNOSIS — G89.4 CHRONIC PAIN SYNDROME: Primary | ICD-10-CM

## 2017-09-25 DIAGNOSIS — G63 B12 NEUROPATHY (HCC): ICD-10-CM

## 2017-09-25 PROCEDURE — 99214 OFFICE O/P EST MOD 30 MIN: CPT | Performed by: NURSE PRACTITIONER

## 2017-09-25 RX ORDER — AMOXICILLIN 875 MG/1
TABLET, COATED ORAL
Refills: 0 | COMMUNITY
Start: 2017-09-23 | End: 2018-05-23

## 2017-09-25 RX ORDER — TRAMADOL HYDROCHLORIDE 50 MG/1
100 TABLET ORAL EVERY 8 HOURS PRN
Qty: 180 TABLET | Refills: 1 | Status: SHIPPED | OUTPATIENT
Start: 2017-09-25 | End: 2017-11-22 | Stop reason: SDUPTHER

## 2017-09-25 RX ORDER — PREGABALIN 200 MG/1
200 CAPSULE ORAL 3 TIMES DAILY
Qty: 90 CAPSULE | Refills: 1 | Status: SHIPPED | OUTPATIENT
Start: 2017-09-25 | End: 2017-11-22 | Stop reason: SDUPTHER

## 2017-09-25 RX ORDER — AMITRIPTYLINE HYDROCHLORIDE 150 MG/1
150 TABLET, FILM COATED ORAL NIGHTLY
Qty: 30 TABLET | Refills: 5 | Status: ON HOLD | OUTPATIENT
Start: 2017-09-25 | End: 2021-06-30

## 2017-09-25 RX ORDER — MECLIZINE HCL 12.5 MG/1
TABLET ORAL
Refills: 0 | Status: ON HOLD | COMMUNITY
Start: 2017-09-23 | End: 2021-11-03

## 2017-09-25 RX ORDER — FLUTICASONE PROPIONATE 50 MCG
SPRAY, SUSPENSION (ML) NASAL
Refills: 0 | COMMUNITY
Start: 2017-09-23 | End: 2019-10-08

## 2017-09-25 NOTE — PROGRESS NOTES
"CHIEF COMPLAINT    F/U back and extremity pain last visit 7-25-17. States her feet and back pain has increased. States the numbness in her legs has increased and she can tell because she has chigger bites that do not itch or bother her. Currently has ear infection in both ears and on abx-so states experiencing mild ear pain.     Subjective   Ana Maria Story is a 46 y.o. female  who presents to the office for follow-up.She has a history of chronic extremity pain due to neuropathy, as well as back pain. Her feet pain is worse. Is seeing a podiatrist. Trying new medications. Sees Dr. Jackson(podiatrist) in a few weeks.  Has a history of fibromas.    Complains of pain in her back and feet. Today her pain is 5/10VAS. Describes the pain as continuous and worse. Continues with Tramadol 50 mg 2 TID, Lyrica 200 mg TID and Flexeril 10 mg 2/day. Denies any side effects from the regimen. The regimen helps decrease her pain by 25-50%. \"It takes the edge off.\"  ADL's by self.    Has previously bilateral Si joint injections with good response. Has not had since may 2017.    Peripheral Neuropathy   This is a chronic (today her LE pain is 5/10VAS.) problem. The current episode started more than 1 year ago. The problem occurs constantly. Progression since onset: unchanged since last office visit. Associated symptoms include arthralgias, fatigue, headaches (occasional), myalgias, numbness (bilateral legs and feet) and weakness (bilateral legs). Pertinent negatives include no chest pain, chills, congestion, coughing, diaphoresis, fever, joint swelling, nausea, neck pain or vomiting. Nothing (cold weather) aggravates the symptoms. She has tried NSAIDs and oral narcotics (Butrans 10 mcg patch, Lyrica 200 mg TID, Flexeril at HS, Elavil 150 mg HS) for the symptoms. The treatment provided moderate relief.   Back Pain   This is a chronic problem. The current episode started more than 1 year ago. The problem occurs 2 to 4 times per day. The " problem has been gradually worsening since onset. The pain is present in the lumbar spine, sacro-iliac and thoracic spine. The pain is at a severity of 5/10. The pain is moderate. Associated symptoms include headaches (occasional), numbness (bilateral legs and feet) and weakness (bilateral legs). Pertinent negatives include no bladder incontinence, bowel incontinence, chest pain, dysuria or fever. She has tried analgesics and muscle relaxant for the symptoms. The treatment provided moderate relief.        PEG Assessment   What number best describes your pain on average in the past week?6  What number best describes how, during the past week, pain has interfered with your enjoyment of life?7  What number best describes how, during the past week, pain has interfered with your general activity?  7    The following portions of the patient's history were reviewed and updated as appropriate: allergies, current medications, past family history, past medical history, past social history, past surgical history and problem list.    Review of Systems   Constitutional: Positive for fatigue. Negative for chills, diaphoresis and fever.   HENT: Negative for congestion.    Eyes: Negative for visual disturbance.   Respiratory: Negative for cough, shortness of breath and wheezing.    Cardiovascular: Negative.  Negative for chest pain.   Gastrointestinal: Positive for constipation. Negative for bowel incontinence, diarrhea, nausea and vomiting.   Genitourinary: Negative for bladder incontinence, difficulty urinating and dysuria.   Musculoskeletal: Positive for arthralgias, back pain and myalgias. Negative for joint swelling and neck pain.   Neurological: Positive for weakness (bilateral legs), numbness (bilateral legs and feet) and headaches (occasional). Negative for dizziness and light-headedness.   Psychiatric/Behavioral: Positive for sleep disturbance. Negative for agitation, confusion, hallucinations and suicidal ideas. The  "patient is not nervous/anxious.        Vitals:    09/25/17 1049   BP: 124/91   Pulse: 101   Resp: 16   Temp: 97.8 °F (36.6 °C)   SpO2: 97%   Weight: 151 lb 12.8 oz (68.9 kg)   Height: 68\" (172.7 cm)   PainSc:   5   PainLoc: Back     Objective   Physical Exam   Constitutional: She is oriented to person, place, and time. She appears well-developed and well-nourished. She is cooperative.   HENT:   Head: Normocephalic and atraumatic.   Nose: Nose normal.   Eyes: Conjunctivae and lids are normal.   Neck: Trachea normal.   Cardiovascular: Normal rate, regular rhythm and normal heart sounds.    Pulmonary/Chest: Effort normal and breath sounds normal.   Musculoskeletal:        Lumbar back: She exhibits tenderness, bony tenderness and pain.   + bilateral Si joint tenderness    + VENKAT bilaterally   Neurological: She is alert and oriented to person, place, and time. Gait normal.   Reflex Scores:       Patellar reflexes are 2+ on the right side and 2+ on the left side.  Reported dysthesia bilateral UE and LE's   Skin: Skin is warm, dry and intact.   Psychiatric: She has a normal mood and affect. Her speech is normal and behavior is normal. Judgment and thought content normal. Cognition and memory are normal.   Nursing note and vitals reviewed.      Assessment/Plan   Ana Maria was seen today for back pain and extremity pain.    Diagnoses and all orders for this visit:    Chronic pain syndrome    Pain of lower extremity, unspecified laterality    B12 neuropathy    Encounter for long-term (current) use of medications    Sacroiliac joint dysfunction of both sides  -     Case Request    Other orders  -     traMADol (ULTRAM) 50 MG tablet; Take 2 tablets by mouth Every 8 (Eight) Hours As Needed for Moderate Pain .  -     pregabalin (LYRICA) 200 MG capsule; Take 1 capsule by mouth 3 (Three) Times a Day.  -     amitriptyline (ELAVIL) 150 MG tablet; Take 1 tablet by mouth Every Night.      --- The urine drug screen confirmation from " 7-25-17 has been reviewed and the result is appropriate based on patient history and POLLY report  --- Refill Tramadol and Lyrica. Patient appears stable with current regimen. No adverse effects. Regarding continuation of opioids, there is no evidence of aberrant behavior or any red flags.  The patient continues with appropriate response to opioid therapy. ADL's remain intact by self.   --- bilateral Si joint injections. No blood thinners. Reviewed the procedure at length with the patient.  Included in the review was expectations, complications, risk and benefits.The procedure was described in detail and the risks, benefits and alternatives were discussed with the patient (including but not limited to: bleeding, infection, nerve damage, worsening of pain, inability to perform injection, paralysis, seizures, and death) who agreed to proceed.  Discussed the potential for sedation if warranted/wanted.  Questions were answered and in a way the patient could understand.  Patient verbalized understanding and wishes to proceed.  This intervention will be ordered.  --- Follow-up 2 months or sooner if needed.       POLLY REPORT    As part of the patient's treatment plan, I am prescribing controlled substances. The patient has been made aware of appropriate use of such medications, including potential risk of somnolence, limited ability to drive and/or work safely, and the potential for dependence or overdose. It has also bee made clear that these medications are for use by this patient only, without concomitant use of alcohol or other substances unless prescribed.     Patient has completed prescribing agreement detailing terms of continued prescribing of controlled substances, including monitoring POLLY reports, urine drug screening, and pill counts if necessary. The patient is aware that inappropriate use will results in cessation of prescribing such medications.    POLLY report has been reviewed and scanned into the  patient's chart.    Date of last POLLY : 9-22-17    History and physical exam exhibit continued safe and appropriate use of controlled substances.      EMR Dragon/Transcription disclaimer:   Much of this encounter note is an electronic transcription/translation of spoken language to printed text. The electronic translation of spoken language may permit erroneous, or at times, nonsensical words or phrases to be inadvertently transcribed; Although I have reviewed the note for such errors, some may still exist.

## 2017-10-10 ENCOUNTER — OUTSIDE FACILITY SERVICE (OUTPATIENT)
Dept: PAIN MEDICINE | Facility: CLINIC | Age: 47
End: 2017-10-10

## 2017-10-10 ENCOUNTER — DOCUMENTATION (OUTPATIENT)
Dept: PAIN MEDICINE | Facility: CLINIC | Age: 47
End: 2017-10-10

## 2017-10-10 PROCEDURE — 27096 INJECT SACROILIAC JOINT: CPT | Performed by: ANESTHESIOLOGY

## 2017-10-10 NOTE — PROGRESS NOTES
Bilateral Sacroiliac Joint Injection  [facility]      PREOPERATIVE DIAGNOSIS:   Sacroiliac joint dysfunction, bilateral    POSTOPERATIVE DIAGNOSIS:  Sacroiliac joint dysfunction, bilateral    PROCEDURE:  Sacroiliac Joint Injection, Bilaterally, with fluoroscopic guidance    PRE-PROCEDURE DISCUSSION WITH PATIENT:    Risks and complications were discussed with the patient prior to starting the procedure and informed consent was obtained.  We discussed various topics including but not limited to bleeding, infection, injury, postprocedural site soreness, painful flareup, worsening of clinical picture, paralysis, coma, and death.     SURGEON:  Epifanio Plaza MD    REASON FOR PROCEDURE:    Patient has pain consistent with SI pathology on history and physical exam. Previous clinically significant therapeutic effect of SI joint injection.      SEDATION:  Patient declined administration of moderate sedation    ANESTHETIC AGENT:  Marcaine 0.5%  STEROID AGENT:  80mg DepoMedrol    DESCRIPTON OF PROCEDURE:  After obtaining informed consent, IV access was not obtained in the preoperative area.  The patient was transported to the operative suite and placed in the prone position with a pillow under the pelvic area. EKG, blood pressure, and pulse oximeter were monitored. The lumbosacral area was prepped with Chloraprep and draped in a sterile fashion. Under fluoroscopic guidance the inferior most portion of the right SI joint was identified. The overlying skin and subcutaneous tissue was anesthetized with 1% lidocaine. A 22-gauge spinal needle was introduced from the inferior most portion of the joint into the RIGHT SI joint under fluoroscopic guidance in the AP dimension with slight oblique rotation to the contralateral side.  Aspiration was negative.  After confirming the position of the needle with fluoroscopy, 1 mL of Omnipaque was injected and after seeing appropriate spread into the joint a total of 2mL  of 0.5% Marcaine, with approximately 40 mg of DepoMedrol, was injected very slowly.  Needle was removed intact.  A similar procedure was performed on the LEFT side.   Vital signs remained stable.  The onset of analgesia was noted.      ESTIMATED BLOOD LOSS:  minimal  SPECIMENS:  None  COMPLICATIONS:  No complications were noted. and There was no indication of vascular uptake on live injection of contrast dye.    TOLERANCE & DISCHARGE CONDITION:    The patient tolerated the procedure well.  The patient was transported to the recovery area without difficulties.  The patient was discharged to home under the care of family in stable and satisfactory condition.    PLAN OF CARE:  1. The patient was given our standard instruction sheet and will resume all medications as per the medication reconciliation sheet.  2. The patient will Repeat injection in 2 months PRN.  3. The patient is instructed to keep a pain log hourly for 8 hours after the procedure.

## 2017-11-22 ENCOUNTER — OFFICE VISIT (OUTPATIENT)
Dept: PAIN MEDICINE | Facility: CLINIC | Age: 47
End: 2017-11-22

## 2017-11-22 VITALS
HEIGHT: 68 IN | HEART RATE: 101 BPM | SYSTOLIC BLOOD PRESSURE: 150 MMHG | OXYGEN SATURATION: 98 % | WEIGHT: 155.6 LBS | BODY MASS INDEX: 23.58 KG/M2 | DIASTOLIC BLOOD PRESSURE: 85 MMHG | TEMPERATURE: 96.6 F | RESPIRATION RATE: 16 BRPM

## 2017-11-22 DIAGNOSIS — M79.606 PAIN OF LOWER EXTREMITY, UNSPECIFIED LATERALITY: ICD-10-CM

## 2017-11-22 DIAGNOSIS — E53.8 B12 NEUROPATHY (HCC): ICD-10-CM

## 2017-11-22 DIAGNOSIS — G89.4 CHRONIC PAIN SYNDROME: Primary | ICD-10-CM

## 2017-11-22 DIAGNOSIS — G63 B12 NEUROPATHY (HCC): ICD-10-CM

## 2017-11-22 DIAGNOSIS — M53.3 SI (SACROILIAC) JOINT DYSFUNCTION: ICD-10-CM

## 2017-11-22 PROCEDURE — 99213 OFFICE O/P EST LOW 20 MIN: CPT | Performed by: NURSE PRACTITIONER

## 2017-11-22 RX ORDER — TRAMADOL HYDROCHLORIDE 50 MG/1
100 TABLET ORAL EVERY 8 HOURS PRN
Qty: 180 TABLET | Refills: 1 | Status: SHIPPED | OUTPATIENT
Start: 2017-11-22 | End: 2018-01-18 | Stop reason: SDUPTHER

## 2017-11-22 RX ORDER — CYCLOBENZAPRINE HCL 10 MG
10 TABLET ORAL 3 TIMES DAILY PRN
Qty: 60 TABLET | Refills: 3 | Status: SHIPPED | OUTPATIENT
Start: 2017-11-22 | End: 2018-03-06 | Stop reason: SDUPTHER

## 2017-11-22 RX ORDER — PREGABALIN 200 MG/1
200 CAPSULE ORAL 3 TIMES DAILY
Qty: 90 CAPSULE | Refills: 1 | Status: SHIPPED | OUTPATIENT
Start: 2017-11-22 | End: 2018-02-15 | Stop reason: SDUPTHER

## 2017-11-22 NOTE — PROGRESS NOTES
"CHIEF COMPLAINT    F/U back and leg pain. Injection helped her back pain. States she is having more numbness in fingers but does not want to go back to the doctor. She states she hasn't had a h/a in a month.    Subjective   Ana Maria Story is a 47 y.o. female  who presents to the office for follow-up of procedure.  She completed a Bilateral SI joint injection   on  10-10-17 performed by Dr. Plaza for management of back pain. Patient reports 50% ongoing relief from the procedure.  \"each time I've gotten the shots, it's helped more each time.\" \"I could notice a big difference in my pain this time.\"    Her back pain is improved since last office visit. Complains of increased numbness in fingers, as well as pain in legs.    Complains of pain in her back, fingers and legs. Today her pain is 4/10VAS. Describes the back pain as nearly continuous and improved, but describes the numbness in hands as continuous. Has not had any further headaches since last injection. . Continues with Tramadol 50 mg 2 TID, Lyrica 200 mg TID and Flexeril 10 mg 1-2/day (always takes at night). Denies any side effects from the regimen. The regimen helps decrease her pain by \"close to 50%. \"\" Notes improved pain control since injection.  ADL's by self.    Peripheral Neuropathy   This is a chronic (today her LE pain is 4/10VAS.) problem. The current episode started more than 1 year ago. The problem occurs constantly. Progression since onset: worse since last office visit. Associated symptoms include arthralgias, fatigue, myalgias, numbness (bilateral legs and feet and hands) and weakness (bilateral legs). Pertinent negatives include no chest pain, chills, congestion, coughing, diaphoresis, fever, headaches (occasional), joint swelling, nausea, neck pain or vomiting. Nothing (cold weather) aggravates the symptoms. She has tried NSAIDs and oral narcotics (Butrans 10 mcg patch, Lyrica 200 mg TID, Flexeril at HS, Elavil 150 mg HS) for the symptoms. " The treatment provided moderate relief.   Back Pain   This is a chronic problem. The current episode started more than 1 year ago. The problem occurs 2 to 4 times per day. Progression since onset: improved since last office visit. The pain is present in the lumbar spine, sacro-iliac and thoracic spine. The pain is at a severity of 4/10. The pain is moderate. Associated symptoms include numbness (bilateral legs and feet and hands) and weakness (bilateral legs). Pertinent negatives include no bladder incontinence, bowel incontinence, chest pain, dysuria, fever or headaches (occasional). She has tried analgesics and muscle relaxant (SI injection--moderate to signficant relief) for the symptoms. The treatment provided moderate relief.      PEG Assessment   What number best describes your pain on average in the past week?6  What number best describes how, during the past week, pain has interfered with your enjoyment of life?7  What number best describes how, during the past week, pain has interfered with your general activity?  7    The following portions of the patient's history were reviewed and updated as appropriate: allergies, current medications, past family history, past medical history, past social history, past surgical history and problem list.    Review of Systems   Constitutional: Positive for fatigue. Negative for chills, diaphoresis and fever.   HENT: Negative for congestion.    Eyes: Negative for visual disturbance.   Respiratory: Negative for cough, shortness of breath and wheezing.    Cardiovascular: Negative.  Negative for chest pain.   Gastrointestinal: Positive for constipation. Negative for bowel incontinence, diarrhea, nausea and vomiting.   Genitourinary: Negative for bladder incontinence, difficulty urinating and dysuria.   Musculoskeletal: Positive for arthralgias, back pain and myalgias. Negative for joint swelling and neck pain.   Neurological: Positive for weakness (bilateral legs) and  "numbness (bilateral legs and feet and hands). Negative for dizziness, light-headedness and headaches (occasional).   Psychiatric/Behavioral: Positive for sleep disturbance. Negative for agitation, confusion, hallucinations and suicidal ideas. The patient is not nervous/anxious.        Vitals:    11/22/17 1056   BP: 150/85   Pulse: 101   Resp: 16   Temp: 96.6 °F (35.9 °C)   SpO2: 98%   Weight: 155 lb 9.6 oz (70.6 kg)   Height: 68\" (172.7 cm)   PainSc:   4   PainLoc: Back     Objective   Physical Exam   Constitutional: She is oriented to person, place, and time. She appears well-developed and well-nourished. She is cooperative.   HENT:   Head: Normocephalic and atraumatic.   Nose: Nose normal.   Eyes: Conjunctivae and lids are normal.   Neck: Trachea normal.   Cardiovascular: Normal rate.    Pulmonary/Chest: Effort normal. No respiratory distress.   Neurological: She is alert and oriented to person, place, and time. Gait normal.   Reported dysthesia bilateral fingers(ALL)   Skin: Skin is warm, dry and intact.   Psychiatric: She has a normal mood and affect. Her speech is normal and behavior is normal. Judgment and thought content normal. Cognition and memory are normal.   Nursing note and vitals reviewed.      Assessment/Plan   Ana Maria was seen today for back pain and extremity pain.    Diagnoses and all orders for this visit:    Chronic pain syndrome    Pain of lower extremity, unspecified laterality    B12 neuropathy    SI (sacroiliac) joint dysfunction    Other orders  -     traMADol (ULTRAM) 50 MG tablet; Take 2 tablets by mouth Every 8 (Eight) Hours As Needed for Moderate Pain .  -     pregabalin (LYRICA) 200 MG capsule; Take 1 capsule by mouth 3 (Three) Times a Day.  -     cyclobenzaprine (FLEXERIL) 10 MG tablet; Take 1 tablet by mouth 3 (Three) Times a Day As Needed for Muscle Spasms.        --- The urine drug screen confirmation from 7-25-17 has been reviewed and the result is appropriate based on patient " history and POLLY report  --- Refill Tramadol with 1 additional refill. Patient appears stable with current regimen. No adverse effects. Regarding continuation of opioids, there is no evidence of aberrant behavior or any red flags.  The patient continues with appropriate response to opioid therapy. ADL's remain intact by self.   --- Discussed cervical xray and EMG--patient wants to wait at this time.   --- Follow-up 2 months or sooner if needed.       POLLY REPORT    As part of the patient's treatment plan, I am prescribing controlled substances. The patient has been made aware of appropriate use of such medications, including potential risk of somnolence, limited ability to drive and/or work safely, and the potential for dependence or overdose. It has also bee made clear that these medications are for use by this patient only, without concomitant use of alcohol or other substances unless prescribed.     Patient has completed prescribing agreement detailing terms of continued prescribing of controlled substances, including monitoring POLLY reports, urine drug screening, and pill counts if necessary. The patient is aware that inappropriate use will results in cessation of prescribing such medications.    POLLY report has been reviewed and scanned into the patient's chart.    Date of last POLLY : 11-20-17    History and physical exam exhibit continued safe and appropriate use of controlled substances.       EMR Dragon/Transcription disclaimer:   Much of this encounter note is an electronic transcription/translation of spoken language to printed text. The electronic translation of spoken language may permit erroneous, or at times, nonsensical words or phrases to be inadvertently transcribed; Although I have reviewed the note for such errors, some may still exist.

## 2018-01-18 ENCOUNTER — OFFICE VISIT (OUTPATIENT)
Dept: PAIN MEDICINE | Facility: CLINIC | Age: 48
End: 2018-01-18

## 2018-01-18 VITALS
DIASTOLIC BLOOD PRESSURE: 75 MMHG | HEIGHT: 68 IN | OXYGEN SATURATION: 100 % | WEIGHT: 154.6 LBS | HEART RATE: 106 BPM | TEMPERATURE: 97.9 F | SYSTOLIC BLOOD PRESSURE: 121 MMHG | RESPIRATION RATE: 18 BRPM | BODY MASS INDEX: 23.43 KG/M2

## 2018-01-18 DIAGNOSIS — Z79.899 ENCOUNTER FOR LONG-TERM (CURRENT) USE OF MEDICATIONS: ICD-10-CM

## 2018-01-18 DIAGNOSIS — M79.606 PAIN OF LOWER EXTREMITY, UNSPECIFIED LATERALITY: ICD-10-CM

## 2018-01-18 DIAGNOSIS — G89.4 CHRONIC PAIN SYNDROME: Primary | ICD-10-CM

## 2018-01-18 DIAGNOSIS — G64 DISORDER OF PERIPHERAL NERVOUS SYSTEM: ICD-10-CM

## 2018-01-18 DIAGNOSIS — M53.3 SI (SACROILIAC) JOINT DYSFUNCTION: ICD-10-CM

## 2018-01-18 DIAGNOSIS — M53.3 SACROILIAC JOINT DYSFUNCTION OF BOTH SIDES: ICD-10-CM

## 2018-01-18 PROCEDURE — 99214 OFFICE O/P EST MOD 30 MIN: CPT | Performed by: NURSE PRACTITIONER

## 2018-01-18 RX ORDER — TRAMADOL HYDROCHLORIDE 50 MG/1
100 TABLET ORAL EVERY 8 HOURS PRN
Qty: 180 TABLET | Refills: 1 | Status: SHIPPED | OUTPATIENT
Start: 2018-01-18 | End: 2018-03-22 | Stop reason: SDUPTHER

## 2018-01-18 NOTE — PROGRESS NOTES
CHIEF COMPLAINT  Follow-up for back and exteremity pain. Ms. Story states that her pain has increased since her last appt.    Subjective   Ana Maria Story is a 47 y.o. female  who presents to the office for follow-up.She has a history of chronic back pain and extremity pain due to neuropathy. Her back pain is worse and she wants to repeat SI joint injections.  Is also noticing her extremity pain is worse due to cold weather.    Complains of pain in her low back and legs. Today her pain is 7/10VAS. Describes the pain as continuous and worse.Continues with Tramadol 50 mg 2 TID, Lyrica 200 mg TID and Flexeril 10 mg 1-2/day (always takes at night). Denies any side effects from the regimen. The regimen helps decrease her pain by 25-50%.Pain is not as well controlled since she needs shot and it is also cold. ADL's by self. Has custody of grandson.    Previous Si joint injection improved her pain 75% relief for 2-3 months.    Is using [place] essential oils. Notes this helps her leg pain.      Peripheral Neuropathy   This is a chronic (today her LE pain is 7/10VAS.) problem. The current episode started more than 1 year ago. The problem occurs constantly. Progression since onset: worse since last office visit. Associated symptoms include arthralgias, coughing, fatigue, myalgias, numbness and weakness. Pertinent negatives include no chest pain, chills, congestion, diaphoresis, fever, headaches (occasional), joint swelling, nausea, neck pain or vomiting. Nothing (cold weather) aggravates the symptoms. She has tried NSAIDs and oral narcotics (Butrans 10 mcg patch, Lyrica 200 mg TID, Flexeril at HS, Elavil 150 mg HS) for the symptoms. The treatment provided moderate relief.   Back Pain   This is a chronic problem. The current episode started more than 1 year ago. The problem occurs constantly. Progression since onset: worsesince last office visit. The pain is present in the lumbar spine, sacro-iliac and thoracic spine.  "The pain is at a severity of 7/10. The pain is moderate. Associated symptoms include numbness and weakness. Pertinent negatives include no bladder incontinence, bowel incontinence, chest pain, dysuria, fever or headaches (occasional). She has tried analgesics and muscle relaxant (SI injection--moderate to signficant relief) for the symptoms. The treatment provided moderate relief.        PEG Assessment   What number best describes your pain on average in the past week?7  What number best describes how, during the past week, pain has interfered with your enjoyment of life?8  What number best describes how, during the past week, pain has interfered with your general activity?  8    The following portions of the patient's history were reviewed and updated as appropriate: allergies, current medications, past family history, past medical history, past social history, past surgical history and problem list.    Review of Systems   Constitutional: Positive for fatigue. Negative for chills, diaphoresis and fever.   HENT: Negative for congestion.    Eyes: Negative for visual disturbance.   Respiratory: Positive for cough. Negative for shortness of breath and wheezing.    Cardiovascular: Negative.  Negative for chest pain.   Gastrointestinal: Negative for bowel incontinence, constipation, diarrhea, nausea and vomiting.   Genitourinary: Negative for bladder incontinence, difficulty urinating and dysuria.   Musculoskeletal: Positive for arthralgias, back pain and myalgias. Negative for joint swelling and neck pain.        Bilateral leg and foot pain   Neurological: Positive for weakness and numbness. Negative for headaches (occasional).   Psychiatric/Behavioral: Positive for sleep disturbance. Negative for suicidal ideas. The patient is not nervous/anxious.        Vitals:    01/18/18 1043   BP: 121/75   Pulse: 106   Resp: 18   Temp: 97.9 °F (36.6 °C)   SpO2: 100%   Weight: 70.1 kg (154 lb 9.6 oz)   Height: 172.7 cm (68\") "   PainSc:   7   PainLoc: Generalized     Objective   Physical Exam   Constitutional: She is oriented to person, place, and time. She appears well-developed and well-nourished. She is cooperative.   HENT:   Head: Normocephalic and atraumatic.   Nose: Nose normal.   Eyes: Conjunctivae and lids are normal.   Neck: Trachea normal.   Cardiovascular: Normal rate.    Pulmonary/Chest: Effort normal.   Musculoskeletal:        Lumbar back: She exhibits tenderness and pain.   Moderate tenderness of bilateral SI joints    + VENKAT bilaterally    NEGATIVE SLR Bilaterally   Neurological: She is alert and oriented to person, place, and time. Gait normal.   Reported dysthesia bilateral fingers(ALL)   Skin: Skin is warm, dry and intact.   Psychiatric: She has a normal mood and affect. Her speech is normal and behavior is normal. Judgment and thought content normal. Cognition and memory are normal.   Nursing note and vitals reviewed.      Assessment/Plan   Ana Maria was seen today for back pain and extremity pain.    Diagnoses and all orders for this visit:    Chronic pain syndrome    Disorder of peripheral nervous system    Pain of lower extremity, unspecified laterality    SI (sacroiliac) joint dysfunction  -     Case Request    Encounter for long-term (current) use of medications    Sacroiliac joint dysfunction of both sides  -     Case Request    Other orders  -     traMADol (ULTRAM) 50 MG tablet; Take 2 tablets by mouth Every 8 (Eight) Hours As Needed for Moderate Pain  (DNF until 1-24-18).      --- The urine drug screen confirmation from 7-25-17 has been reviewed and the result is appropriate based on patient history and POLLY report  --- Refill Tramadol. Dated 1-24-18. Patient appears stable with current regimen. No adverse effects. Regarding continuation of opioids, there is no evidence of aberrant behavior or any red flags.  The patient continues with appropriate response to opioid therapy. ADL's remain intact by self. she  does have a significant history of chronic low back and extremity pain and demonstrates moderate improvement with multimodal therapy including opioid therapy, which allows her to engage in ADLs and family activities. The continuation of opioid therapy is therefore not contraindicated. We will however respect the March 2016 CDC Guidelines and will plan to avoid overexposure to opioids and avoid dose escalation.  --- bilateral SI joint injections. No blood thinners. Reviewed the procedure at length with the patient.  Included in the review was expectations, complications, risk and benefits.The procedure was described in detail and the risks, benefits and alternatives were discussed with the patient (including but not limited to: bleeding, infection, nerve damage, worsening of pain, inability to perform injection, paralysis, seizures, and death) who agreed to proceed.  Discussed the potential for sedation if warranted/wanted.  Questions were answered and in a way the patient could understand.  Patient verbalized understanding and wishes to proceed.  This intervention will be ordered.  --- Follow-up 2 months or sooner if needed.     POLLY REPORT    As part of the patient's treatment plan, I am prescribing controlled substances. The patient has been made aware of appropriate use of such medications, including potential risk of somnolence, limited ability to drive and/or work safely, and the potential for dependence or overdose. It has also bee made clear that these medications are for use by this patient only, without concomitant use of alcohol or other substances unless prescribed.     Patient has completed prescribing agreement detailing terms of continued prescribing of controlled substances, including monitoring POLLY reports, urine drug screening, and pill counts if necessary. The patient is aware that inappropriate use will results in cessation of prescribing such medications.    POLLY report has been reviewed  and scanned into the patient's chart.    Date of last POLLY : 1-17-18    History and physical exam exhibit continued safe and appropriate use of controlled substances.    ----------  Education about Sacroiliac joint injections:  This Sacroiliac joint injection (blockade) we have suggested is intended for diagnostic purposes, with the intent of offering the patient Radiofrequency thermal rhizotomy (RF) of the sensory branches to the joint if the block is diagnostically effective.  The diagnostic blockade is necessary to determine the likelihood that RF therapy could be efficacious in providing long term relief to the patient.    In this procedure, the sacroiliac joint is aligned with imaging, and under image guidance a needle is placed with the needle tip into the joint.  The needle position is confirmed to be appropriately in the joint before injection of medication into the joint.  When xray fluoroscopy is used, contrast dye is used to confirm a proper arthrogram (i.e., outline of the joint).  When ultrasound is used, IV fluid (normal saline) is injected to see the flow of the fluid into the joint.  Once confirmed, then the medication can be injected into the joint.  Oftentimes this medication is a combination of local anesthetics (for diagnostic purposes) and also a steroid (to decrease irritation & inflammation in the joint, also known as sacroilitis).      Medically, a successful RF procedure should provide a patient with 50% pain relief or more for at least 6 months.  Clinical experience suggests that successful patients receive relief more in the range of 12 months on average.  We also discussed that many patients receive therapeutic success from the intraarticular joint injection, and may not require RF ablation.  If a patient receives more than 8 weeks of relief from joint injection, then occasional repeat joint blocks for therapeutic purposes is a very reasonable alternative therapy.  This course of  therapy is consistent with our LCDs according to our CMS  in the area, and therefore other insurance providers should follow accordingly.  We will monitor our patients to screen for these therapeutic responders and will offer RF therapy only when necessary.      We discussed that joint injections & also RF procedures are not without risks.  Best practices regarding anticoagulant use & neuraxial procedures will be respected.  Oftentimes a patient on an anticoagulant can be offered a joint injection safely, but again this is not risk-free, and such patients give consent with regards to this increased bleeding risk, which could cause problems including but not limited to worsening of pain, nerve damage, or muscle damage.  Patients that are ill or otherwise may be at risk for sepsis will not have their spines accessed by neuraxial injections of any type.  This patient will not be offered these therapies if there is an increased risk.   We discussed that there is a risk of postprocedural pain and also a risk of worsening of clinical picture with these procedures as with any neuraxial procedure.    ----------      EMR Dragon/Transcription disclaimer:   Much of this encounter note is an electronic transcription/translation of spoken language to printed text. The electronic translation of spoken language may permit erroneous, or at times, nonsensical words or phrases to be inadvertently transcribed; Although I have reviewed the note for such errors, some may still exist.

## 2018-02-06 ENCOUNTER — OUTSIDE FACILITY SERVICE (OUTPATIENT)
Dept: PAIN MEDICINE | Facility: CLINIC | Age: 48
End: 2018-02-06

## 2018-02-06 ENCOUNTER — DOCUMENTATION (OUTPATIENT)
Dept: PAIN MEDICINE | Facility: CLINIC | Age: 48
End: 2018-02-06

## 2018-02-06 PROCEDURE — 27096 INJECT SACROILIAC JOINT: CPT | Performed by: ANESTHESIOLOGY

## 2018-02-06 NOTE — PROGRESS NOTES
Bilateral Sacroiliac Joint Injection  [facility]      PREOPERATIVE DIAGNOSIS:   Sacroiliac joint dysfunction, bilateral    POSTOPERATIVE DIAGNOSIS:  Sacroiliac joint dysfunction, bilateral    PROCEDURE:  Sacroiliac Joint Injection, Bilaterally, with fluoroscopic guidance    PRE-PROCEDURE DISCUSSION WITH PATIENT:    Risks and complications were discussed with the patient prior to starting the procedure and informed consent was obtained.  We discussed various topics including but not limited to bleeding, infection, injury, postprocedural site soreness, painful flareup, worsening of clinical picture, paralysis, coma, and death.     SURGEON:  Epifanio Plaza MD    REASON FOR PROCEDURE:    Patient has pain consistent with SI pathology on history and physical exam. Previous clinically significant therapeutic effect of SI joint injection.   Previous diagnostic positivity of SI joint injection.      SEDATION:  Patient declined administration of moderate sedation    ANESTHETIC AGENT:  Marcaine 0.5%  STEROID AGENT:  80mg DepoMedrol    DESCRIPTON OF PROCEDURE:  After obtaining informed consent, IV access was not obtained in the preoperative area.  The patient was transported to the operative suite and placed in the prone position with a pillow under the pelvic area. EKG, blood pressure, and pulse oximeter were monitored. The lumbosacral area was prepped with Chloraprep and draped in a sterile fashion. Under fluoroscopic guidance the inferior most portion of the right SI joint was identified. The overlying skin and subcutaneous tissue was anesthetized with 1% lidocaine. A 22-gauge spinal needle was introduced from the inferior most portion of the joint into the RIGHT SI joint under fluoroscopic guidance in the AP dimension with slight oblique rotation to the contralateral side.  Aspiration was negative.  After confirming the position of the needle with fluoroscopy, 1 mL of Omnipaque was injected and after  seeing appropriate spread into the joint a total of 2mL of 0.5% Marcaine, with approximately 40 mg of DepoMedrol, was injected very slowly.  Needle was removed intact.  A similar procedure was performed on the LEFT side.   Vital signs remained stable.  The onset of analgesia was noted.      ESTIMATED BLOOD LOSS:  minimal  SPECIMENS:  None  COMPLICATIONS:  No complications were noted. and There was no indication of vascular uptake on live injection of contrast dye.    TOLERANCE & DISCHARGE CONDITION:    The patient tolerated the procedure well.  The patient was transported to the recovery area without difficulties.  The patient was discharged to home under the care of family in stable and satisfactory condition.    PLAN OF CARE:  1. The patient was given our standard instruction sheet and will resume all medications as per the medication reconciliation sheet.  2. The patient will Repeat injection in 2-4 wks PRN.  3. The patient is instructed to keep a pain log hourly for 8 hours after the procedure.

## 2018-03-06 ENCOUNTER — TELEPHONE (OUTPATIENT)
Dept: PAIN MEDICINE | Facility: CLINIC | Age: 48
End: 2018-03-06

## 2018-03-06 RX ORDER — CYCLOBENZAPRINE HCL 10 MG
TABLET ORAL
Qty: 60 TABLET | Refills: 3 | Status: SHIPPED | OUTPATIENT
Start: 2018-03-06 | End: 2018-05-23 | Stop reason: SDUPTHER

## 2018-03-06 NOTE — TELEPHONE ENCOUNTER
Pt called because she had a question about a notification on [facility]. You approved a refill of her tizanidine. She then remarked that the script is only for 60 tablets and she takes it 3 times a day, so it only lasts her 20 days. She requests next time it is filled (she has three refills on it), you fill for 90 tablets so she has 30 days worth. She states the pharmacist will not change how many tablets are dispensed without doctor or APRN's order.

## 2018-03-06 NOTE — TELEPHONE ENCOUNTER
I think you mean cyclobenzaprine. At her last office visit, she reported taking a maximum of 2/day. SHe can still take it up to every 8 hours but I based the quantity on her report. If she is taking it differently, she needs to let us know. Thanks. catia

## 2018-03-06 NOTE — TELEPHONE ENCOUNTER
Yes, Im sorry, I meant flexeril. Ok, pt will need to clarify next visit. She told me she is taking it three times a day. She is fine with this quantity but wants to discuss it on next visit.

## 2018-03-22 ENCOUNTER — OFFICE VISIT (OUTPATIENT)
Dept: PAIN MEDICINE | Facility: CLINIC | Age: 48
End: 2018-03-22

## 2018-03-22 ENCOUNTER — RESULTS ENCOUNTER (OUTPATIENT)
Dept: PAIN MEDICINE | Facility: CLINIC | Age: 48
End: 2018-03-22

## 2018-03-22 VITALS
DIASTOLIC BLOOD PRESSURE: 101 MMHG | HEART RATE: 102 BPM | WEIGHT: 156 LBS | HEIGHT: 68 IN | OXYGEN SATURATION: 100 % | SYSTOLIC BLOOD PRESSURE: 172 MMHG | RESPIRATION RATE: 18 BRPM | TEMPERATURE: 98.3 F | BODY MASS INDEX: 23.64 KG/M2

## 2018-03-22 DIAGNOSIS — M53.3 SACROILIAC JOINT DYSFUNCTION OF BOTH SIDES: ICD-10-CM

## 2018-03-22 DIAGNOSIS — G63 B12 NEUROPATHY (HCC): ICD-10-CM

## 2018-03-22 DIAGNOSIS — G89.4 CHRONIC PAIN SYNDROME: ICD-10-CM

## 2018-03-22 DIAGNOSIS — Z79.899 ENCOUNTER FOR LONG-TERM (CURRENT) USE OF MEDICATIONS: ICD-10-CM

## 2018-03-22 DIAGNOSIS — M79.606 PAIN OF LOWER EXTREMITY, UNSPECIFIED LATERALITY: ICD-10-CM

## 2018-03-22 DIAGNOSIS — E53.8 B12 NEUROPATHY (HCC): ICD-10-CM

## 2018-03-22 DIAGNOSIS — M53.3 SI (SACROILIAC) JOINT DYSFUNCTION: ICD-10-CM

## 2018-03-22 DIAGNOSIS — G89.4 CHRONIC PAIN SYNDROME: Primary | ICD-10-CM

## 2018-03-22 PROCEDURE — 99214 OFFICE O/P EST MOD 30 MIN: CPT | Performed by: NURSE PRACTITIONER

## 2018-03-22 PROCEDURE — 96372 THER/PROPH/DIAG INJ SC/IM: CPT | Performed by: NURSE PRACTITIONER

## 2018-03-22 PROCEDURE — 80305 DRUG TEST PRSMV DIR OPT OBS: CPT | Performed by: NURSE PRACTITIONER

## 2018-03-22 RX ORDER — PREGABALIN 200 MG/1
200 CAPSULE ORAL 3 TIMES DAILY
Qty: 90 CAPSULE | Refills: 2 | Status: SHIPPED | OUTPATIENT
Start: 2018-03-22 | End: 2018-05-23 | Stop reason: SDUPTHER

## 2018-03-22 RX ORDER — TRAMADOL HYDROCHLORIDE 50 MG/1
100 TABLET ORAL EVERY 8 HOURS PRN
Qty: 180 TABLET | Refills: 1 | Status: SHIPPED | OUTPATIENT
Start: 2018-03-22 | End: 2018-05-23 | Stop reason: SDUPTHER

## 2018-03-22 RX ORDER — KETOROLAC TROMETHAMINE 30 MG/ML
30 INJECTION, SOLUTION INTRAMUSCULAR; INTRAVENOUS ONCE
Status: COMPLETED | OUTPATIENT
Start: 2018-03-22 | End: 2018-03-22

## 2018-03-22 RX ADMIN — KETOROLAC TROMETHAMINE 30 MG: 30 INJECTION, SOLUTION INTRAMUSCULAR; INTRAVENOUS at 11:25

## 2018-03-22 NOTE — PROGRESS NOTES
"CHIEF COMPLAINT  Back pain is unchanged since last visit. She did have a fall before she got here today and she slipped on ice and fell on her back side. She states the injection gave her 50% relief and states her pain is just now starting to flare back up.    Subjective   Ana Maria Story is a 47 y.o. female  who presents to the office for follow-up of procedure.  She completed a Bilateral Sacroiliac Joint Injection   on  2/6/18 performed by Dr. Plaza for management of low back pain. Patient reports 50-80%  relief from the procedure.  She reports no more relief since fall today. \"It's starting to flare back up again.\"    Also has a history of peripheral neuropathy, reports her pain pattern is unchanged since last office visit.    Complains of pain in her low back and lower extremities. Today her pain is 9/10VAS. Describes the pain as worse today. Her pain remains continuous. Continues with Tramadol 50 mg 2 TID, Lyrica 200 mg TID and Flexeril 10 mg 1-2/day (always takes at night). Denies any side effects from the regimen. The regimen helps decrease her pain by 25-50%. ADL's by self. Has custody of grandson.    Peripheral Neuropathy   This is a chronic (today her LE pain is 7/10VAS.) problem. The current episode started more than 1 year ago. The problem occurs constantly. Progression since onset: unchanged since last office visit. Associated symptoms include arthralgias, coughing, fatigue, myalgias, numbness and weakness. Pertinent negatives include no chest pain, chills, congestion, diaphoresis, fever, headaches, joint swelling, nausea, neck pain or vomiting. Nothing (cold weather) aggravates the symptoms. She has tried NSAIDs and oral narcotics (Butrans 10 mcg patch, Lyrica 200 mg TID, Flexeril at HS, Elavil 150 mg HS) for the symptoms. The treatment provided moderate relief.   Back Pain   This is a chronic problem. The current episode started more than 1 year ago. The problem occurs constantly. Progression since " onset: worsesince last office visit. The pain is present in the lumbar spine, sacro-iliac and thoracic spine. The pain is at a severity of 9/10. The pain is moderate. Associated symptoms include numbness and weakness. Pertinent negatives include no bladder incontinence, bowel incontinence, chest pain, dysuria, fever or headaches. She has tried analgesics and muscle relaxant (SI injection--moderate to signficant relief) for the symptoms. The treatment provided moderate relief.     Procedure List  2-6-18-- bilateral SI  10-10-17-- bilateral SI  5-23-17-- bilateral SI     PEG Assessment   What number best describes your pain on average in the past week?6  What number best describes how, during the past week, pain has interfered with your enjoyment of life?6  What number best describes how, during the past week, pain has interfered with your general activity?  6    The following portions of the patient's history were reviewed and updated as appropriate: allergies, current medications, past family history, past medical history, past social history, past surgical history and problem list.    Review of Systems   Constitutional: Positive for fatigue. Negative for chills, diaphoresis and fever.   HENT: Negative for congestion.    Respiratory: Positive for cough. Negative for shortness of breath.    Cardiovascular: Negative for chest pain.   Gastrointestinal: Positive for constipation and diarrhea. Negative for bowel incontinence, nausea and vomiting.   Genitourinary: Negative for bladder incontinence, difficulty urinating, dyspareunia and dysuria.   Musculoskeletal: Positive for arthralgias, back pain and myalgias. Negative for joint swelling and neck pain.   Neurological: Positive for weakness and numbness. Negative for dizziness, light-headedness and headaches.   Psychiatric/Behavioral: Positive for sleep disturbance. Negative for confusion, hallucinations, self-injury and suicidal ideas. The patient is not  "nervous/anxious.        Vitals:    03/22/18 1104   BP: (!) 172/101   Pulse: 102   Resp: 18   Temp: 98.3 °F (36.8 °C)   SpO2: 100%   Weight: 70.8 kg (156 lb)   Height: 172.7 cm (68\")   PainSc:   9   PainLoc: Back     Objective   Physical Exam   Constitutional: She is oriented to person, place, and time. She appears well-developed and well-nourished. She is cooperative.   HENT:   Head: Normocephalic and atraumatic.   Nose: Nose normal.   Eyes: Conjunctivae and lids are normal.   Neck: Trachea normal.   Cardiovascular: Normal rate.    Pulmonary/Chest: Effort normal.   Musculoskeletal:        Lumbar back: She exhibits tenderness and pain.   Moderate tenderness of bilateral SI joints, right slightly worse than left    + VENKAT bilaterally    NEGATIVE SLR Bilaterally   Neurological: She is alert and oriented to person, place, and time. Gait normal.   Reported dysthesia bilateral fingers(ALL)   Skin: Skin is warm, dry and intact.   Psychiatric: She has a normal mood and affect. Her speech is normal and behavior is normal. Judgment and thought content normal. Cognition and memory are normal.   Nursing note and vitals reviewed.    Assessment/Plan   Ana Maria was seen today for back pain.    Diagnoses and all orders for this visit:    Chronic pain syndrome  -     Urine Drug Screen Confirmation - Urine, Urine, Clean Catch; Future  -     POC Urine Drug Screen, Triage    B12 neuropathy    Pain of lower extremity, unspecified laterality    Sacroiliac joint dysfunction of both sides  -     Case Request  -     ketorolac (TORADOL) injection 30 mg; Inject 30 mg into the shoulder, thigh, or buttocks 1 (One) Time.    SI (sacroiliac) joint dysfunction  -     Case Request  -     ketorolac (TORADOL) injection 30 mg; Inject 30 mg into the shoulder, thigh, or buttocks 1 (One) Time.    Encounter for long-term (current) use of medications    Other orders  -     pregabalin (LYRICA) 200 MG capsule; Take 1 capsule by mouth 3 (Three) Times a Day.  - "     traMADol (ULTRAM) 50 MG tablet; Take 2 tablets by mouth Every 8 (Eight) Hours As Needed for Moderate Pain  (DNF until 4-15-18).      --- Routine UDS in office today as part of monitoring requirements for controlled substances.  The specimen was viewed and the immunoassay result reviewed and is +TCA(APPROPRIATE, Tramadol is not positive on POC).  This specimen will be sent to [facility] for confirmation.     --- Continue with Tramadol. Dated refill today. Patient appears stable with current regimen. No adverse effects. Regarding continuation of opioids, there is no evidence of aberrant behavior or any red flags.  The patient continues with appropriate response to opioid therapy. ADL's remain intact by self. she does have a significant history of chronic low back pain and peripheral neuropathy and demonstrates moderate improvement with multimodal therapy including opioid therapy, which allows her to engage in ADLs and family activities. The continuation of opioid therapy is therefore not contraindicated. We will however respect the March 2016 CDC Guidelines and will plan to avoid overexposure to opioids and avoid dose escalation.  --- Repeat bilateral SI joint injection. No blood thinners. Reviewed the procedure at length with the patient.  Included in the review was expectations, complications, risk and benefits.The procedure was described in detail and the risks, benefits and alternatives were discussed with the patient (including but not limited to: bleeding, infection, nerve damage, worsening of pain, inability to perform injection, paralysis, seizures, and death) who agreed to proceed.  Discussed the potential for sedation if warranted/wanted.  Questions were answered and in a way the patient could understand.  Patient verbalized understanding and wishes to proceed.  This intervention will be ordered.  --- Follow-up 2 months or sooner if needed.     POLLY REPORT    As part of the patient's  treatment plan, I am prescribing controlled substances. The patient has been made aware of appropriate use of such medications, including potential risk of somnolence, limited ability to drive and/or work safely, and the potential for dependence or overdose. It has also bee made clear that these medications are for use by this patient only, without concomitant use of alcohol or other substances unless prescribed.     Patient has completed prescribing agreement detailing terms of continued prescribing of controlled substances, including monitoring POLLY reports, urine drug screening, and pill counts if necessary. The patient is aware that inappropriate use will results in cessation of prescribing such medications.    POLLY report has been reviewed and scanned into the patient's chart.    Date of last POLLY : 3-20-18    History and physical exam exhibit continued safe and appropriate use of controlled substances.       EMR Dragon/Transcription disclaimer:   Much of this encounter note is an electronic transcription/translation of spoken language to printed text. The electronic translation of spoken language may permit erroneous, or at times, nonsensical words or phrases to be inadvertently transcribed; Although I have reviewed the note for such errors, some may still exist.

## 2018-05-23 ENCOUNTER — OFFICE VISIT (OUTPATIENT)
Dept: PAIN MEDICINE | Facility: CLINIC | Age: 48
End: 2018-05-23

## 2018-05-23 VITALS
SYSTOLIC BLOOD PRESSURE: 134 MMHG | HEIGHT: 68 IN | BODY MASS INDEX: 23.92 KG/M2 | TEMPERATURE: 96.2 F | HEART RATE: 84 BPM | OXYGEN SATURATION: 100 % | RESPIRATION RATE: 16 BRPM | DIASTOLIC BLOOD PRESSURE: 88 MMHG | WEIGHT: 157.8 LBS

## 2018-05-23 DIAGNOSIS — E53.8 B12 NEUROPATHY (HCC): ICD-10-CM

## 2018-05-23 DIAGNOSIS — G89.4 CHRONIC PAIN SYNDROME: Primary | ICD-10-CM

## 2018-05-23 DIAGNOSIS — M53.3 SACROILIAC JOINT DYSFUNCTION OF BOTH SIDES: ICD-10-CM

## 2018-05-23 DIAGNOSIS — M53.3 SI (SACROILIAC) JOINT DYSFUNCTION: ICD-10-CM

## 2018-05-23 DIAGNOSIS — G63 B12 NEUROPATHY (HCC): ICD-10-CM

## 2018-05-23 DIAGNOSIS — G64 DISORDER OF PERIPHERAL NERVOUS SYSTEM: ICD-10-CM

## 2018-05-23 DIAGNOSIS — Z79.899 ENCOUNTER FOR LONG-TERM (CURRENT) USE OF MEDICATIONS: ICD-10-CM

## 2018-05-23 PROCEDURE — 99214 OFFICE O/P EST MOD 30 MIN: CPT | Performed by: NURSE PRACTITIONER

## 2018-05-23 RX ORDER — CYCLOBENZAPRINE HCL 10 MG
10 TABLET ORAL 3 TIMES DAILY PRN
Qty: 90 TABLET | Refills: 3 | Status: SHIPPED | OUTPATIENT
Start: 2018-05-23 | End: 2018-09-20 | Stop reason: SDUPTHER

## 2018-05-23 RX ORDER — TRAMADOL HYDROCHLORIDE 50 MG/1
100 TABLET ORAL EVERY 8 HOURS PRN
Qty: 180 TABLET | Refills: 1 | Status: SHIPPED | OUTPATIENT
Start: 2018-05-23 | End: 2018-07-18 | Stop reason: SDUPTHER

## 2018-05-23 RX ORDER — PREGABALIN 200 MG/1
200 CAPSULE ORAL 3 TIMES DAILY
Qty: 90 CAPSULE | Refills: 2 | Status: SHIPPED | OUTPATIENT
Start: 2018-05-23 | End: 2018-11-16 | Stop reason: SDUPTHER

## 2018-05-23 NOTE — PROGRESS NOTES
CHIEF COMPLAINT  F/U back pain. Pt states her pain has increased since last visit. States she will need refill on flexeril, lyrica, and tramadol.     Subjective   Ana Maria Story is a 47 y.o. female  who presents to the office for follow-up.She has a history of chronic pain in her legs due to neuropathy and low back pain. Reports her pain is worse since last office visit.    Complains of pain in her low back and legs. Today her pain is 7/10VAS.  Describes the pain as continuous and worsen. Continues with Tramadol 50 mg 2 TID, Lyrica 200 mg TID and Flexeril 10 mg 2-3/day (always takes at night). Denies any side effects from the regimen. The regimen helps decrease her pain moderately. ADL's by self. No longer has custody of grandson. Has been helping care for 10 month old grandson, whose mother was in  labor.    She completed a Bilateral Sacroiliac Joint Injection   on  18 performed by Dr. Plaza for management of low back pain. Patient reports 50-80%  relief from the procedure.   This has waned and she wants to repeat.    Peripheral Neuropathy   This is a chronic (today her LE pain is 7/10VAS.) problem. The current episode started more than 1 year ago. The problem occurs constantly. Progression since onset: unchanged since last office visit. Associated symptoms include arthralgias, fatigue, myalgias, numbness and weakness. Pertinent negatives include no chest pain, chills, congestion, coughing, diaphoresis, fever, headaches, joint swelling, nausea, neck pain or vomiting. Nothing (cold weather) aggravates the symptoms. She has tried NSAIDs and oral narcotics (Butrans 10 mcg patch, Lyrica 200 mg TID, Flexeril at HS, Elavil 150 mg HS) for the symptoms. The treatment provided moderate relief.   Back Pain   This is a chronic problem. The current episode started more than 1 year ago. The problem occurs constantly. Progression since onset: worse since last office visit. The pain is present in the lumbar spine,  sacro-iliac and thoracic spine. The pain is at a severity of 7/10. The pain is moderate. Associated symptoms include numbness and weakness. Pertinent negatives include no bladder incontinence, bowel incontinence, chest pain, dysuria, fever or headaches. She has tried analgesics and muscle relaxant (SI injection--moderate to signficant relief) for the symptoms. The treatment provided moderate relief.      Procedure List  --2-6-18-- bilateral SI  --10-10-17-- bilateral SI  --5-25-17-- bilateral SI  --4-25-17-- bilateral SI    PEG Assessment   What number best describes your pain on average in the past week?7  What number best describes how, during the past week, pain has interfered with your enjoyment of life?7  What number best describes how, during the past week, pain has interfered with your general activity?  7    The following portions of the patient's history were reviewed and updated as appropriate: allergies, current medications, past family history, past medical history, past social history, past surgical history and problem list.    Review of Systems   Constitutional: Positive for fatigue. Negative for chills, diaphoresis and fever.   HENT: Negative for congestion.    Respiratory: Negative for cough and shortness of breath.    Cardiovascular: Negative for chest pain.   Gastrointestinal: Positive for constipation and diarrhea. Negative for bowel incontinence, nausea and vomiting.   Genitourinary: Negative for bladder incontinence, difficulty urinating, dyspareunia and dysuria.   Musculoskeletal: Positive for arthralgias, back pain and myalgias. Negative for joint swelling and neck pain.   Neurological: Positive for weakness and numbness. Negative for dizziness, light-headedness and headaches.   Psychiatric/Behavioral: Positive for sleep disturbance. Negative for agitation, confusion, hallucinations, self-injury and suicidal ideas. The patient is not nervous/anxious.        Vitals:    05/23/18 0835   BP:  "134/88   Pulse: 84   Resp: 16   Temp: 96.2 °F (35.7 °C)   SpO2: 100%   Weight: 71.6 kg (157 lb 12.8 oz)   Height: 172.7 cm (67.99\")   PainSc:   7   PainLoc: Back     Objective   Physical Exam   Constitutional: She is oriented to person, place, and time. She appears well-developed and well-nourished. She is cooperative.   HENT:   Head: Normocephalic and atraumatic.   Nose: Nose normal.   Eyes: Conjunctivae and lids are normal.   Neck: Trachea normal.   Cardiovascular: Normal rate.    Pulmonary/Chest: Effort normal.   Musculoskeletal:        Lumbar back: She exhibits tenderness and pain.   Moderate tenderness of bilateral SI joints, right slightly worse than left    + VENKAT bilaterally    NEGATIVE SLR Bilaterally   Neurological: She is alert and oriented to person, place, and time. Gait abnormal.   Reflex Scores:       Patellar reflexes are 1+ on the right side and 1+ on the left side.  Reported dysthesia bilateral fingers(ALL) and feet   Skin: Skin is warm, dry and intact.   Psychiatric: She has a normal mood and affect. Her speech is normal and behavior is normal. Judgment and thought content normal. Cognition and memory are normal.   Nursing note and vitals reviewed.      Assessment/Plan   Ana Maria was seen today for back pain.    Diagnoses and all orders for this visit:    Chronic pain syndrome    Sacroiliac joint dysfunction of both sides  -     Case Request    SI (sacroiliac) joint dysfunction  -     Case Request    B12 neuropathy    Disorder of peripheral nervous system    Encounter for long-term (current) use of medications    Other orders  -     cyclobenzaprine (FLEXERIL) 10 MG tablet; Take 1 tablet by mouth 3 (Three) Times a Day As Needed for Muscle Spasms.  -     traMADol (ULTRAM) 50 MG tablet; Take 2 tablets by mouth Every 8 (Eight) Hours As Needed for Moderate Pain .  -     pregabalin (LYRICA) 200 MG capsule; Take 1 capsule by mouth 3 (Three) Times a Day.      --- The urine drug screen confirmation from " 3-22-18 has been reviewed and the result is APPROPRIATE(however was negative for Lyrica--may have been out based on refill dates) based on patient history and POLLY report  --- Refill Tramadol, Lyrica and Flexeril. Patient appears stable with current regimen. No adverse effects. Regarding continuation of opioids, there is no evidence of aberrant behavior or any red flags.  The patient continues with appropriate response to opioid therapy. ADL's remain intact by self. she does have a significant history of extremity and back pain and demonstrates moderate improvement with multimodal therapy including opioid therapy, which allows her to engage in ADLs and family activities. The continuation of opioid therapy is therefore not contraindicated. We will however respect the March 2016 CDC Guidelines and will plan to avoid overexposure to opioids and avoid dose escalation.  --- Bilateral Si joint injections. No blood thinners. Reviewed the procedure at length with the patient.  Included in the review was expectations, complications, risk and benefits.The procedure was described in detail and the risks, benefits and alternatives were discussed with the patient (including but not limited to: bleeding, infection, nerve damage, worsening of pain, inability to perform injection, paralysis, seizures, and death) who agreed to proceed.  Discussed the potential for sedation if warranted/wanted.  Questions were answered and in a way the patient could understand.  Patient verbalized understanding and wishes to proceed.  This intervention will be ordered.  --- Follow-up 2 month or sooner if needed.       POLLY REPORT    As part of the patient's treatment plan, I am prescribing controlled substances. The patient has been made aware of appropriate use of such medications, including potential risk of somnolence, limited ability to drive and/or work safely, and the potential for dependence or overdose. It has also bee made clear that  these medications are for use by this patient only, without concomitant use of alcohol or other substances unless prescribed.     Patient has completed prescribing agreement detailing terms of continued prescribing of controlled substances, including monitoring POLLY reports, urine drug screening, and pill counts if necessary. The patient is aware that inappropriate use will results in cessation of prescribing such medications.    POLLY report has been reviewed and scanned into the patient's chart.    As the clinician, I personally reviewed the POLLY from 5-21-18 while the patient was in the office today.    History and physical exam exhibit continued safe and appropriate use of controlled substances.      EMR Dragon/Transcription disclaimer:   Much of this encounter note is an electronic transcription/translation of spoken language to printed text. The electronic translation of spoken language may permit erroneous, or at times, nonsensical words or phrases to be inadvertently transcribed; Although I have reviewed the note for such errors, some may still exist.

## 2018-07-17 RX ORDER — TRAMADOL HYDROCHLORIDE 50 MG/1
TABLET ORAL
Qty: 180 TABLET | Refills: 1 | OUTPATIENT
Start: 2018-07-17

## 2018-07-18 ENCOUNTER — OFFICE VISIT (OUTPATIENT)
Dept: PAIN MEDICINE | Facility: CLINIC | Age: 48
End: 2018-07-18

## 2018-07-18 VITALS
HEIGHT: 68 IN | BODY MASS INDEX: 23.34 KG/M2 | HEART RATE: 89 BPM | DIASTOLIC BLOOD PRESSURE: 92 MMHG | OXYGEN SATURATION: 99 % | SYSTOLIC BLOOD PRESSURE: 149 MMHG | RESPIRATION RATE: 18 BRPM | WEIGHT: 154 LBS | TEMPERATURE: 97.3 F

## 2018-07-18 DIAGNOSIS — G64 DISORDER OF PERIPHERAL NERVOUS SYSTEM: ICD-10-CM

## 2018-07-18 DIAGNOSIS — M53.3 SI (SACROILIAC) JOINT DYSFUNCTION: ICD-10-CM

## 2018-07-18 DIAGNOSIS — M53.3 SACROILIAC JOINT DYSFUNCTION OF BOTH SIDES: ICD-10-CM

## 2018-07-18 DIAGNOSIS — Z79.899 ENCOUNTER FOR LONG-TERM (CURRENT) USE OF MEDICATIONS: ICD-10-CM

## 2018-07-18 DIAGNOSIS — E53.8 B12 NEUROPATHY (HCC): ICD-10-CM

## 2018-07-18 DIAGNOSIS — G63 B12 NEUROPATHY (HCC): ICD-10-CM

## 2018-07-18 DIAGNOSIS — G89.4 CHRONIC PAIN SYNDROME: Primary | ICD-10-CM

## 2018-07-18 PROCEDURE — 99214 OFFICE O/P EST MOD 30 MIN: CPT | Performed by: NURSE PRACTITIONER

## 2018-07-18 RX ORDER — TRAMADOL HYDROCHLORIDE 50 MG/1
100 TABLET ORAL EVERY 8 HOURS PRN
Qty: 180 TABLET | Refills: 1 | Status: SHIPPED | OUTPATIENT
Start: 2018-07-18 | End: 2018-09-13 | Stop reason: SDUPTHER

## 2018-07-18 NOTE — PROGRESS NOTES
CHIEF COMPLAINT  Back pain has increased since last visit.    Subjective   Ana Maria Story is a 47 y.o. female  who presents to the office for follow-up.She has a history of chronic back pain and extremity pain due to neuropathy. Reports her back pain is worse since last office visit. She was ordered to have SI joint injections after last office visit. It was approved and patient was called to schedule but she states she never received a message.     Complains of pain in her low back and extremities. Today her pain is 7/10VAS. Describes the pain as continuous and worse.  Continues with Tramadol 50 mg 2 TID, Lyrica 200 mg TID and Flexeril 10 mg 2-3/day (always takes at night). Denies any side effects from the regimen. The regimen helps decrease her pain by 50%. ADL's by self. Lives with . No longer has custody of grandson.    Peripheral Neuropathy   This is a chronic (today her LE pain is 7/10VAS.) problem. The current episode started more than 1 year ago. The problem occurs constantly. Progression since onset: unchanged since last office visit. Associated symptoms include arthralgias, fatigue and myalgias. Pertinent negatives include no chest pain, chills, congestion, coughing, diaphoresis, fever, headaches, joint swelling, nausea, neck pain, numbness, vomiting or weakness. Nothing (cold weather) aggravates the symptoms. She has tried NSAIDs and oral narcotics (Butrans 10 mcg patch, Lyrica 200 mg TID, Flexeril at HS, Elavil 150 mg HS) for the symptoms. The treatment provided moderate relief.   Back Pain   This is a chronic problem. The current episode started more than 1 year ago. The problem occurs constantly. Progression since onset: worse since last office visit. The pain is present in the lumbar spine, sacro-iliac and thoracic spine. The pain is at a severity of 7/10. The pain is moderate. Pertinent negatives include no bladder incontinence, bowel incontinence, chest pain, dysuria, fever, headaches,  "numbness or weakness. She has tried analgesics and muscle relaxant (SI injection--moderate to signficant relief) for the symptoms. The treatment provided moderate relief.    I have reviewed the above HPI today, 07/18/18.  The HPI is otherwise unchanged from the previous office visit.      Procedure List  --2-6-18-- bilateral SI  --10-10-17-- bilateral SI  --5-25-17-- bilateral SI  --4-25-17-- bilateral SI    PEG Assessment   What number best describes your pain on average in the past week?7  What number best describes how, during the past week, pain has interfered with your enjoyment of life?7  What number best describes how, during the past week, pain has interfered with your general activity?  7    The following portions of the patient's history were reviewed and updated as appropriate: allergies, current medications, past family history, past medical history, past social history, past surgical history and problem list.    Review of Systems   Constitutional: Positive for fatigue. Negative for chills, diaphoresis and fever.   HENT: Negative for congestion.    Respiratory: Negative for cough and shortness of breath.    Cardiovascular: Negative for chest pain.   Gastrointestinal: Negative for bowel incontinence, constipation, diarrhea, nausea and vomiting.   Genitourinary: Negative for bladder incontinence, difficulty urinating, dyspareunia and dysuria.   Musculoskeletal: Positive for arthralgias, back pain and myalgias. Negative for joint swelling and neck pain.   Neurological: Negative for dizziness, weakness, light-headedness, numbness and headaches.   Psychiatric/Behavioral: Negative for confusion, hallucinations, self-injury, sleep disturbance and suicidal ideas. The patient is not nervous/anxious.        Vitals:    07/18/18 0856   BP: 149/92   Pulse: 89   Resp: 18   Temp: 97.3 °F (36.3 °C)   SpO2: 99%   Weight: 69.9 kg (154 lb)   Height: 172.7 cm (67.99\")   PainSc:   7   PainLoc: Back     Objective   Physical " Exam   Constitutional: She is oriented to person, place, and time. She appears well-developed and well-nourished. She is cooperative.   HENT:   Head: Normocephalic and atraumatic.   Nose: Nose normal.   Eyes: Conjunctivae and lids are normal.   Neck: Trachea normal.   Cardiovascular: Normal rate.    Pulmonary/Chest: Effort normal.   Musculoskeletal:        Lumbar back: She exhibits tenderness and pain.   Moderate tenderness of bilateral SI joints, right slightly worse than left    + VENKAT bilaterally    NEGATIVE SLR Bilaterally   Neurological: She is alert and oriented to person, place, and time. Gait abnormal.   Reflex Scores:       Patellar reflexes are 1+ on the right side and 1+ on the left side.  Reported dysthesia bilateral fingers(ALL) and feet   Skin: Skin is warm, dry and intact.   Psychiatric: She has a normal mood and affect. Her speech is normal and behavior is normal. Judgment and thought content normal. Cognition and memory are normal.   Nursing note and vitals reviewed.  I HAVE PERFORMED THE PHYSICAL EXAMINATION AS DOCUMENTED ABOVE TODAY, 07/18/2018.  THE EXAM IS OTHERWISE UNCHANGED FROM PREVIOUS VISIT.        Assessment/Plan   Ana Maria was seen today for back pain.    Diagnoses and all orders for this visit:    Chronic pain syndrome    Disorder of peripheral nervous system    B12 neuropathy (CMS/HCC)    SI (sacroiliac) joint dysfunction  -     Case Request    Encounter for long-term (current) use of medications    Sacroiliac joint dysfunction of both sides  -     Case Request    Other orders  -     traMADol (ULTRAM) 50 MG tablet; Take 2 tablets by mouth Every 8 (Eight) Hours As Needed for Moderate Pain .      --- The urine drug screen confirmation from 3-22-18 has been reviewed and the result is APPROPRIATE based on patient history and POLLY report  --- Refill Tramadol. Patient appears stable with current regimen. No adverse effects. Regarding continuation of opioids, there is no evidence of aberrant  behavior or any red flags.  The patient continues with appropriate response to opioid therapy. ADL's remain intact by self.   --- Repeat bilateral Si joint injections. Previous authorization lapsed. No blood thinners. Reviewed the procedure at length with the patient.  Included in the review was expectations, complications, risk and benefits.The procedure was described in detail and the risks, benefits and alternatives were discussed with the patient (including but not limited to: bleeding, infection, nerve damage, worsening of pain, inability to perform injection, paralysis, seizures, and death) who agreed to proceed.  Discussed the potential for sedation if warranted/wanted.  Questions were answered and in a way the patient could understand.  Patient verbalized understanding and wishes to proceed.  This intervention will be ordered.  --- Follow-up 2 months or sooner if needed.       POLLY REPORT    As part of the patient's treatment plan, I am prescribing controlled substances. The patient has been made aware of appropriate use of such medications, including potential risk of somnolence, limited ability to drive and/or work safely, and the potential for dependence or overdose. It has also bee made clear that these medications are for use by this patient only, without concomitant use of alcohol or other substances unless prescribed.     Patient has completed prescribing agreement detailing terms of continued prescribing of controlled substances, including monitoring POLLY reports, urine drug screening, and pill counts if necessary. The patient is aware that inappropriate use will results in cessation of prescribing such medications.    POLLY report has been reviewed and scanned into the patient's chart.    As the clinician, I personally reviewed the POLLY from 7-17-18 while the patient was in the office today.    History and physical exam exhibit continued safe and appropriate use of controlled  substances.      EMR Dragon/Transcription disclaimer:   Much of this encounter note is an electronic transcription/translation of spoken language to printed text. The electronic translation of spoken language may permit erroneous, or at times, nonsensical words or phrases to be inadvertently transcribed; Although I have reviewed the note for such errors, some may still exist.

## 2018-08-14 ENCOUNTER — DOCUMENTATION (OUTPATIENT)
Dept: PAIN MEDICINE | Facility: CLINIC | Age: 48
End: 2018-08-14

## 2018-08-14 ENCOUNTER — OUTSIDE FACILITY SERVICE (OUTPATIENT)
Dept: PAIN MEDICINE | Facility: CLINIC | Age: 48
End: 2018-08-14

## 2018-08-14 PROCEDURE — 27096 INJECT SACROILIAC JOINT: CPT | Performed by: ANESTHESIOLOGY

## 2018-08-14 NOTE — PROGRESS NOTES
Bilateral Sacroiliac Joint Injection  [facility]      PREOPERATIVE DIAGNOSIS:   Sacroiliac joint dysfunction, bilateral    POSTOPERATIVE DIAGNOSIS:  Sacroiliac joint dysfunction, bilateral    PROCEDURE:  Sacroiliac Joint Injection, Bilaterally, with fluoroscopic guidance    PRE-PROCEDURE DISCUSSION WITH PATIENT:    Risks and complications were discussed with the patient prior to starting the procedure and informed consent was obtained.  We discussed various topics including but not limited to bleeding, infection, injury, postprocedural site soreness, painful flareup, worsening of clinical picture, paralysis, coma, and death.     SURGEON:  Epifanio Plaza MD    REASON FOR PROCEDURE:    Positive sacroiliac provocation maneuvers noted.   Previous clinically significant therapeutic effect of SI joint injection.      SEDATION:  Patient declined administration of moderate sedation    ANESTHETIC AGENT:  Marcaine 0.5%  STEROID AGENT:  80mg DepoMedrol    DESCRIPTON OF PROCEDURE:  After obtaining informed consent, IV access was not obtained in the preoperative area.  The patient was transported to the operative suite and placed in the prone position with a pillow under the pelvic area. EKG, blood pressure, and pulse oximeter were monitored. The lumbosacral area was prepped with Chloraprep and draped in a sterile fashion. Under fluoroscopic guidance the inferior most portion of the right SI joint was identified. The overlying skin and subcutaneous tissue was anesthetized with 1% lidocaine. A 22-gauge spinal needle was introduced from the inferior most portion of the joint into the RIGHT SI joint under fluoroscopic guidance in the AP dimension with slight oblique rotation to the contralateral side.  Aspiration was negative.  After confirming the position of the needle with fluoroscopy, 1 mL of Omnipaque was injected and after seeing appropriate spread into the joint a total of 2mL of 0.5% Marcaine, with  approximately 40 mg of DepoMedrol, was injected very slowly.  Needle was removed intact.  A similar procedure was performed on the LEFT side.   Vital signs remained stable.  The onset of analgesia was noted.      ESTIMATED BLOOD LOSS:  minimal  SPECIMENS:  None  COMPLICATIONS:  No complications were noted., There was no indication of vascular uptake on live injection of contrast dye. and The patient did not have any signs of postprocedure numbness nor weakness.    TOLERANCE & DISCHARGE CONDITION:    The patient tolerated the procedure well.  The patient was transported to the recovery area without difficulties.  The patient was discharged to home under the care of family in stable and satisfactory condition.    PLAN OF CARE:  1. The patient was given our standard instruction sheet and will resume all medications as per the medication reconciliation sheet.  2. The patient will Repeat injection in 2 months PRN.  3. The patient is instructed to keep a pain log hourly for 8 hours after the procedure.

## 2018-09-13 ENCOUNTER — OFFICE VISIT (OUTPATIENT)
Dept: PAIN MEDICINE | Facility: CLINIC | Age: 48
End: 2018-09-13

## 2018-09-13 VITALS
OXYGEN SATURATION: 100 % | HEART RATE: 93 BPM | TEMPERATURE: 98.6 F | DIASTOLIC BLOOD PRESSURE: 103 MMHG | WEIGHT: 155 LBS | BODY MASS INDEX: 23.49 KG/M2 | HEIGHT: 68 IN | RESPIRATION RATE: 18 BRPM | SYSTOLIC BLOOD PRESSURE: 160 MMHG

## 2018-09-13 DIAGNOSIS — G64 DISORDER OF PERIPHERAL NERVOUS SYSTEM: ICD-10-CM

## 2018-09-13 DIAGNOSIS — M53.3 SI (SACROILIAC) JOINT DYSFUNCTION: ICD-10-CM

## 2018-09-13 DIAGNOSIS — M79.606 PAIN OF LOWER EXTREMITY, UNSPECIFIED LATERALITY: ICD-10-CM

## 2018-09-13 DIAGNOSIS — Z79.899 ENCOUNTER FOR LONG-TERM (CURRENT) USE OF MEDICATIONS: ICD-10-CM

## 2018-09-13 DIAGNOSIS — G89.4 CHRONIC PAIN SYNDROME: Primary | ICD-10-CM

## 2018-09-13 PROCEDURE — 99214 OFFICE O/P EST MOD 30 MIN: CPT | Performed by: NURSE PRACTITIONER

## 2018-09-13 RX ORDER — TRAMADOL HYDROCHLORIDE 50 MG/1
100 TABLET ORAL EVERY 8 HOURS PRN
Qty: 180 TABLET | Refills: 1 | Status: SHIPPED | OUTPATIENT
Start: 2018-09-13 | End: 2018-11-28 | Stop reason: SDUPTHER

## 2018-09-13 NOTE — PROGRESS NOTES
CHIEF COMPLAINT  Back pain is unchanged since last visit. She states she received 30-40% relief from the injection.    Subjective   Ana Maria Story is a 47 y.o. female  who presents to the office for follow-up of procedure.  She completed a Bilateral Sacroiliac Joint Injection   on  8/14/18 performed by Dr. FRENCH for management of low back pain. Patient reports 30-40% ongoing relief from the procedure.     Complains of pain in her low back and extremities. Today her pain is 6/10VAS. Describes the pain as continuous and slightly improved since injection. Her back pain is improved. Extremity pain is unchanged. Continues with Tramadol 50 mg 2 TID, Lyrica 200 mg TID and Flexeril 10 mg 2-3/day PRN (always takes at least one at night). Denies any side effects from the regimen. The regimen helps decrease her pain by 50-60%. ADL's by self.     Has a new grandson, Alise.    Peripheral Neuropathy   This is a chronic (today her LE pain is 6/10VAS.) problem. The current episode started more than 1 year ago. The problem occurs constantly. Progression since onset: unchanged since last office visit. Associated symptoms include arthralgias, fatigue, myalgias, numbness and weakness. Pertinent negatives include no chest pain, congestion, coughing, diaphoresis, fever, headaches, joint swelling, nausea, neck pain or vomiting. Nothing (cold weather) aggravates the symptoms. She has tried NSAIDs and oral narcotics (Butrans 10 mcg patch, Lyrica 200 mg TID, Flexeril at HS, Elavil 150 mg HS) for the symptoms. The treatment provided moderate relief.   Back Pain   This is a chronic problem. The current episode started more than 1 year ago. The problem occurs constantly. Progression since onset: improved since last office visit. The pain is present in the lumbar spine, sacro-iliac and thoracic spine. The pain is at a severity of 6/10. The pain is moderate. Associated symptoms include numbness and weakness. Pertinent negatives include no  "bladder incontinence, bowel incontinence, chest pain, dysuria, fever or headaches. She has tried analgesics and muscle relaxant (SI injection--moderate to signficant relief) for the symptoms. The treatment provided moderate relief.      Procedure List  --8-14-18-- bilateral SI  --2-6-18-- bilateral SI  --10-10-17-- bilateral SI  --5-25-17-- bilateral SI  --4-25-17-- bilateral SI    PEG Assessment   What number best describes your pain on average in the past week?6  What number best describes how, during the past week, pain has interfered with your enjoyment of life?6  What number best describes how, during the past week, pain has interfered with your general activity?  6    The following portions of the patient's history were reviewed and updated as appropriate: allergies, current medications, past family history, past medical history, past social history, past surgical history and problem list.    Review of Systems   Constitutional: Positive for fatigue. Negative for diaphoresis and fever.   HENT: Negative for congestion.    Respiratory: Negative for cough and shortness of breath.    Cardiovascular: Negative for chest pain.   Gastrointestinal: Positive for constipation and diarrhea. Negative for bowel incontinence, nausea and vomiting.   Genitourinary: Negative for bladder incontinence, difficulty urinating, dyspareunia and dysuria.   Musculoskeletal: Positive for arthralgias, back pain and myalgias. Negative for joint swelling and neck pain.   Neurological: Positive for weakness and numbness. Negative for dizziness, light-headedness and headaches.   Psychiatric/Behavioral: Positive for sleep disturbance. Negative for confusion, hallucinations, self-injury and suicidal ideas. The patient is not nervous/anxious.        Vitals:    09/13/18 0813   BP: (!) 160/103   Pulse: 93   Resp: 18   Temp: 98.6 °F (37 °C)   SpO2: 100%   Weight: 70.3 kg (155 lb)   Height: 172.7 cm (67.99\")   PainSc:   6   PainLoc: Back "     Objective   Physical Exam   Constitutional: She is oriented to person, place, and time. She appears well-developed and well-nourished. She is cooperative.   HENT:   Head: Normocephalic and atraumatic.   Nose: Nose normal.   Eyes: Conjunctivae and lids are normal.   Neck: Trachea normal.   Cardiovascular: Normal rate.    Pulmonary/Chest: Effort normal.   Musculoskeletal:        Lumbar back: She exhibits tenderness and pain.   Mild to moderate tenderness of bilateral SI joints   Neurological: She is alert and oriented to person, place, and time. Gait abnormal.   Reflex Scores:       Patellar reflexes are 1+ on the right side and 1+ on the left side.  Reported dysthesia bilateral fingers(ALL) and feet   Skin: Skin is warm, dry and intact.   Psychiatric: She has a normal mood and affect. Her speech is normal and behavior is normal. Judgment and thought content normal. Cognition and memory are normal.   Nursing note and vitals reviewed.      Assessment/Plan   Ana Maria was seen today for back pain.    Diagnoses and all orders for this visit:    Chronic pain syndrome    SI (sacroiliac) joint dysfunction    Pain of lower extremity, unspecified laterality    Disorder of peripheral nervous system    Encounter for long-term (current) use of medications    Other orders  -     traMADol (ULTRAM) 50 MG tablet; Take 2 tablets by mouth Every 8 (Eight) Hours As Needed for Moderate Pain .      --- The urine drug screen confirmation from 3-22-18 has been reviewed and the result is APPROPRIATE based on patient history and POLLY report  --- Continue with regimen. No changes at this time. Refill Tramadol DNF until 9-19-18 + 1 refill. Patient appears stable with current regimen. No adverse effects. Regarding continuation of opioids, there is no evidence of aberrant behavior or any red flags.  The patient continues with appropriate response to opioid therapy. ADL's remain intact by self.   --- Repeat interventions in future PRN.  ---  Follow-up 2 months or sooner if needed.       POLLY REPORT    As part of the patient's treatment plan, I am prescribing controlled substances. The patient has been made aware of appropriate use of such medications, including potential risk of somnolence, limited ability to drive and/or work safely, and the potential for dependence or overdose. It has also bee made clear that these medications are for use by this patient only, without concomitant use of alcohol or other substances unless prescribed.     Patient has completed prescribing agreement detailing terms of continued prescribing of controlled substances, including monitoring POLLY reports, urine drug screening, and pill counts if necessary. The patient is aware that inappropriate use will results in cessation of prescribing such medications.    POLLY report has been reviewed and scanned into the patient's chart.    As the clinician, I personally reviewed the POLLY from 9-12-18 while the patient was in the office today.    History and physical exam exhibit continued safe and appropriate use of controlled substances.       EMR Dragon/Transcription disclaimer:   Much of this encounter note is an electronic transcription/translation of spoken language to printed text. The electronic translation of spoken language may permit erroneous, or at times, nonsensical words or phrases to be inadvertently transcribed; Although I have reviewed the note for such errors, some may still exist.

## 2018-09-20 RX ORDER — CYCLOBENZAPRINE HCL 10 MG
TABLET ORAL
Qty: 90 TABLET | Refills: 3 | Status: SHIPPED | OUTPATIENT
Start: 2018-09-20 | End: 2019-01-31 | Stop reason: SDUPTHER

## 2018-11-08 ENCOUNTER — RESULTS ENCOUNTER (OUTPATIENT)
Dept: PAIN MEDICINE | Facility: CLINIC | Age: 48
End: 2018-11-08

## 2018-11-08 ENCOUNTER — OFFICE VISIT (OUTPATIENT)
Dept: PAIN MEDICINE | Facility: CLINIC | Age: 48
End: 2018-11-08

## 2018-11-08 VITALS
SYSTOLIC BLOOD PRESSURE: 133 MMHG | WEIGHT: 152 LBS | TEMPERATURE: 98.4 F | OXYGEN SATURATION: 99 % | BODY MASS INDEX: 23.04 KG/M2 | DIASTOLIC BLOOD PRESSURE: 89 MMHG | HEIGHT: 68 IN | RESPIRATION RATE: 18 BRPM | HEART RATE: 102 BPM

## 2018-11-08 DIAGNOSIS — M53.3 SI (SACROILIAC) JOINT DYSFUNCTION: ICD-10-CM

## 2018-11-08 DIAGNOSIS — E53.8 B12 NEUROPATHY (HCC): ICD-10-CM

## 2018-11-08 DIAGNOSIS — G63 B12 NEUROPATHY (HCC): ICD-10-CM

## 2018-11-08 DIAGNOSIS — G89.4 CHRONIC PAIN SYNDROME: ICD-10-CM

## 2018-11-08 DIAGNOSIS — Z79.899 ENCOUNTER FOR LONG-TERM (CURRENT) USE OF MEDICATIONS: ICD-10-CM

## 2018-11-08 DIAGNOSIS — G89.4 CHRONIC PAIN SYNDROME: Primary | ICD-10-CM

## 2018-11-08 PROCEDURE — 99213 OFFICE O/P EST LOW 20 MIN: CPT | Performed by: NURSE PRACTITIONER

## 2018-11-08 PROCEDURE — 80305 DRUG TEST PRSMV DIR OPT OBS: CPT | Performed by: NURSE PRACTITIONER

## 2018-11-08 NOTE — PROGRESS NOTES
CHIEF COMPLAINT  Back pain has increased since last visit. She states she fell last week and states she is having right arm numbness and some numb area's in her back.  Subjective   Ana Maria Story is a 47 y.o. female  who presents to the office for follow-up.She has a history of chronic back and extremity pain. This pain is unchanged since last office visit. However, since last office visit, she had a fall and reports occasional numbness in her right arm and back.    Now has custody of 2.5 month old grandson. Brought to appointment today. Having to carry car seat.    Complains of pain in low back, extremities. Today her pain is 6/10VAS. Describes the pain as nearly continuous aching and throbbing. Her pain is worse since last office visit.  She fell last week. Was going down wheelchair ramp while raining. Fell backwards and landed on butt and right side. She hit her right arm on rail during fall. Had bruising on right arm. Also the cold weather makes pain worse.  Continues with Tramadol 50 mg 2 TID, Lyrica 200 mg TID and Flexeril 10 mg 2-3/day PRN (always takes at least one at night). Denies any side effects. The regimen helps decrease her pain by 50%. ADL's by self.     Peripheral Neuropathy   This is a chronic (today her LE pain is 6/10VAS.) problem. The current episode started more than 1 year ago. The problem occurs constantly. Progression since onset: unchanged since last office visit. Associated symptoms include arthralgias, fatigue, myalgias, numbness and weakness. Pertinent negatives include no chest pain, chills, congestion, coughing, diaphoresis, fever, headaches, joint swelling, nausea, neck pain or vomiting. Nothing (cold weather) aggravates the symptoms. She has tried NSAIDs and oral narcotics (Tramadol 50 mg 2 TID Lyrica 200 mg TID, Flexeril at HS, Elavil 150 mg HS) for the symptoms. The treatment provided moderate relief.   Back Pain   This is a chronic problem. The current episode started more than 1  year ago. The problem occurs constantly. Progression since onset: worse since last office visit-- fall. The pain is present in the lumbar spine, sacro-iliac and thoracic spine. The pain is at a severity of 6/10. The pain is moderate. Associated symptoms include numbness and weakness. Pertinent negatives include no bladder incontinence, bowel incontinence, chest pain, dysuria, fever or headaches. She has tried analgesics and muscle relaxant (SI injection--moderate to signficant relief) for the symptoms. The treatment provided moderate relief.      Procedure List  --8-14-18-- bilateral SI  --2-6-18-- bilateral SI  --10-10-17-- bilateral SI  --5-25-17-- bilateral SI  --4-25-17-- bilateral SI    PEG Assessment   What number best describes your pain on average in the past week?6  What number best describes how, during the past week, pain has interfered with your enjoyment of life?6  What number best describes how, during the past week, pain has interfered with your general activity?  6    The following portions of the patient's history were reviewed and updated as appropriate: allergies, current medications, past family history, past medical history, past social history, past surgical history and problem list.    Review of Systems   Constitutional: Positive for fatigue. Negative for chills, diaphoresis and fever.   HENT: Negative for congestion.    Respiratory: Negative for cough and shortness of breath.    Cardiovascular: Negative for chest pain and leg swelling.   Gastrointestinal: Negative for bowel incontinence, constipation, diarrhea, nausea, rectal pain and vomiting.   Genitourinary: Negative for bladder incontinence, difficulty urinating, dyspareunia and dysuria.   Musculoskeletal: Positive for arthralgias, back pain and myalgias. Negative for joint swelling and neck pain.   Neurological: Positive for weakness and numbness. Negative for dizziness, light-headedness and headaches.   Psychiatric/Behavioral: Negative  "for confusion, hallucinations, self-injury, sleep disturbance and suicidal ideas. The patient is not nervous/anxious.        Vitals:    11/08/18 0852   BP: 133/89   Pulse: 102   Resp: 18   Temp: 98.4 °F (36.9 °C)   SpO2: 99%   Weight: 68.9 kg (152 lb)   Height: 172.7 cm (67.99\")   PainSc:   6   PainLoc: Back     Objective   Physical Exam   Constitutional: She is oriented to person, place, and time. She appears well-developed and well-nourished. She is cooperative.   HENT:   Head: Normocephalic and atraumatic.   Nose: Nose normal.   Eyes: Conjunctivae and lids are normal.   Neck: Trachea normal.   Cardiovascular: Normal rate.    Pulmonary/Chest: Effort normal.   Musculoskeletal:        Lumbar back: She exhibits tenderness and pain.   Mild tenderness of bilateral SI joints   Neurological: She is alert and oriented to person, place, and time. Gait abnormal.   Reflex Scores:       Patellar reflexes are 1+ on the right side and 1+ on the left side.  Reported dysthesia bilateral fingers(ALL) and feet   Skin: Skin is warm, dry and intact.   Psychiatric: She has a normal mood and affect. Her speech is normal and behavior is normal. Judgment and thought content normal. Cognition and memory are normal.   Nursing note and vitals reviewed.      Assessment/Plan   Ana Maria was seen today for back pain.    Diagnoses and all orders for this visit:    Chronic pain syndrome    SI (sacroiliac) joint dysfunction    B12 neuropathy (CMS/HCC)    Encounter for long-term (current) use of medications      --- Routine UDS in office today as part of monitoring requirements for controlled substances.  The specimen was viewed and the immunoassay result reviewed and is +TCA(APPROPRIATE).  This specimen will be sent to [facility] for confirmation.     --- Continue with Tramadol. Not due for a refill. Will call when due.  --- Consider interventions in future PRN.  --- Follow-up 2 months or sooner if needed.       POLLY REPORT    As part " of the patient's treatment plan, I am prescribing controlled substances. The patient has been made aware of appropriate use of such medications, including potential risk of somnolence, limited ability to drive and/or work safely, and the potential for dependence or overdose. It has also bee made clear that these medications are for use by this patient only, without concomitant use of alcohol or other substances unless prescribed.     Patient has completed prescribing agreement detailing terms of continued prescribing of controlled substances, including monitoring POLLY reports, urine drug screening, and pill counts if necessary. The patient is aware that inappropriate use will results in cessation of prescribing such medications.    POLLY report has been reviewed and scanned into the patient's chart.    As the clinician, I personally reviewed the POLLY from 11-7-18 while the patient was in the office today.    History and physical exam exhibit continued safe and appropriate use of controlled substances.      EMR Dragon/Transcription disclaimer:   Much of this encounter note is an electronic transcription/translation of spoken language to printed text. The electronic translation of spoken language may permit erroneous, or at times, nonsensical words or phrases to be inadvertently transcribed; Although I have reviewed the note for such errors, some may still exist.

## 2018-11-20 ENCOUNTER — PRIOR AUTHORIZATION (OUTPATIENT)
Dept: PAIN MEDICINE | Facility: CLINIC | Age: 48
End: 2018-11-20

## 2018-11-28 RX ORDER — TRAMADOL HYDROCHLORIDE 50 MG/1
TABLET ORAL
Qty: 180 TABLET | Refills: 1 | Status: SHIPPED | OUTPATIENT
Start: 2018-11-28 | End: 2019-01-31 | Stop reason: SDUPTHER

## 2019-01-08 ENCOUNTER — OFFICE VISIT (OUTPATIENT)
Dept: PAIN MEDICINE | Facility: CLINIC | Age: 49
End: 2019-01-08

## 2019-01-08 VITALS
OXYGEN SATURATION: 97 % | BODY MASS INDEX: 22.61 KG/M2 | DIASTOLIC BLOOD PRESSURE: 76 MMHG | HEART RATE: 94 BPM | RESPIRATION RATE: 15 BRPM | WEIGHT: 149.2 LBS | TEMPERATURE: 96.4 F | HEIGHT: 68 IN | SYSTOLIC BLOOD PRESSURE: 127 MMHG

## 2019-01-08 DIAGNOSIS — G63 B12 NEUROPATHY (HCC): ICD-10-CM

## 2019-01-08 DIAGNOSIS — Z79.899 ENCOUNTER FOR LONG-TERM (CURRENT) USE OF MEDICATIONS: ICD-10-CM

## 2019-01-08 DIAGNOSIS — E53.8 B12 NEUROPATHY (HCC): ICD-10-CM

## 2019-01-08 DIAGNOSIS — N39.0 URINARY TRACT INFECTION WITHOUT HEMATURIA, SITE UNSPECIFIED: ICD-10-CM

## 2019-01-08 DIAGNOSIS — G89.4 CHRONIC PAIN SYNDROME: Primary | ICD-10-CM

## 2019-01-08 DIAGNOSIS — M53.3 SI (SACROILIAC) JOINT DYSFUNCTION: ICD-10-CM

## 2019-01-08 PROCEDURE — 96372 THER/PROPH/DIAG INJ SC/IM: CPT | Performed by: NURSE PRACTITIONER

## 2019-01-08 PROCEDURE — 99214 OFFICE O/P EST MOD 30 MIN: CPT | Performed by: NURSE PRACTITIONER

## 2019-01-08 RX ORDER — KETOROLAC TROMETHAMINE 30 MG/ML
30 INJECTION, SOLUTION INTRAMUSCULAR; INTRAVENOUS ONCE
Status: COMPLETED | OUTPATIENT
Start: 2019-01-08 | End: 2019-01-08

## 2019-01-08 RX ORDER — NITROFURANTOIN 25; 75 MG/1; MG/1
100 CAPSULE ORAL 2 TIMES DAILY
Qty: 6 CAPSULE | Refills: 0 | Status: SHIPPED | OUTPATIENT
Start: 2019-01-08 | End: 2019-10-08

## 2019-01-08 RX ADMIN — KETOROLAC TROMETHAMINE 30 MG: 30 INJECTION, SOLUTION INTRAMUSCULAR; INTRAVENOUS at 09:13

## 2019-01-08 NOTE — PROGRESS NOTES
"CHIEF COMPLAINT  F/U back pain. Pt states has worsened and wants injections.    Subjective   Ana Maria Story is a 48 y.o. female  who presents to the office for follow-up.She has a history of chronic back pain. Reports this is worse since last office visit. Also has extremity pain due to neuropathy, but this is not worse.    Complains of pain in her low back and extremities. Her low back pain is her primary complaint. Today her pain is 8/10VAS. Describes the pain as continuous aching and throbbing. Pain increases with walking, standing, bending/lifting/twisting and caring for grandchild; pain decreases with medication, rest, injections. Continues with Tramadol 50 mg 2 TID, Lyrica 200 mg TID and Flexeril 10 mg 2-3/day PRN (always takes at least one at night). Denies any side effects form the regimen. The regimen helps decrease her pain by 30-50%. ADL's by self. Denies any bowel or bladder changes. Requests repeat SI joint injection. Has previously had with significant relief for several months.     Concerned she has a UTI. Never had one before. Has constant urge to go but then goes little. Also has pain in pelvis. Denies any fever or chills.  \"I've had a kidney infection and I know it's not that.\"  Peripheral Neuropathy   This is a chronic problem. The current episode started more than 1 year ago. The problem occurs constantly. Progression since onset: unchanged since last office visit. Associated symptoms include arthralgias, fatigue, myalgias, numbness and weakness. Pertinent negatives include no chest pain, chills, congestion, coughing, diaphoresis, fever, headaches, joint swelling, nausea, neck pain or vomiting. Nothing (cold weather) aggravates the symptoms. She has tried NSAIDs and oral narcotics (Tramadol 50 mg 2 TID Lyrica 200 mg TID, Flexeril at HS, Elavil 150 mg HS) for the symptoms. The treatment provided moderate relief.   Back Pain   This is a chronic problem. The current episode started more than 1 year " ago. The problem occurs constantly. Progression since onset: worse since last office visit. The pain is present in the lumbar spine, sacro-iliac and thoracic spine. The pain is at a severity of 8/10. The pain is moderate. Associated symptoms include numbness and weakness. Pertinent negatives include no bladder incontinence, bowel incontinence, chest pain, dysuria, fever or headaches. She has tried analgesics and muscle relaxant (SI injection--moderate to signficant relief) for the symptoms. The treatment provided moderate relief.      Procedure List  --8-14-18-- bilateral SI  --2-6-18-- bilateral SI  --10-10-17-- bilateral SI  --5-25-17-- bilateral SI  --4-25-17-- bilateral SI    PEG Assessment   What number best describes your pain on average in the past week?8  What number best describes how, during the past week, pain has interfered with your enjoyment of life?8  What number best describes how, during the past week, pain has interfered with your general activity?  8    The following portions of the patient's history were reviewed and updated as appropriate: allergies, current medications, past family history, past medical history, past social history, past surgical history and problem list.    Review of Systems   Constitutional: Positive for fatigue. Negative for chills, diaphoresis and fever.   HENT: Negative for congestion.    Respiratory: Negative for cough and shortness of breath.    Cardiovascular: Negative for chest pain and leg swelling.   Gastrointestinal: Positive for constipation (occ). Negative for bowel incontinence, diarrhea, nausea, rectal pain and vomiting.   Genitourinary: Negative for bladder incontinence, difficulty urinating, dyspareunia and dysuria.   Musculoskeletal: Positive for arthralgias, back pain and myalgias. Negative for joint swelling and neck pain.   Neurological: Positive for weakness and numbness. Negative for dizziness, light-headedness and headaches.   Psychiatric/Behavioral:  "Positive for sleep disturbance. Negative for confusion, hallucinations, self-injury and suicidal ideas. The patient is not nervous/anxious.        Vitals:    01/08/19 0842   BP: 127/76   Pulse: 94   Resp: 15   Temp: 96.4 °F (35.8 °C)   SpO2: 97%   Weight: 67.7 kg (149 lb 3.2 oz)   Height: 172.7 cm (67.99\")   PainSc:   8   PainLoc: Back     Objective   Physical Exam   Constitutional: She is oriented to person, place, and time. She appears well-developed and well-nourished. She is cooperative.   Looks like she does not feel well   HENT:   Head: Normocephalic and atraumatic.   Nose: Nose normal.   Eyes: Conjunctivae and lids are normal.   Neck: Trachea normal.   Cardiovascular: Normal rate.   Pulmonary/Chest: Effort normal.   Musculoskeletal:        Lumbar back: She exhibits tenderness and pain.   MODERATE TO EXTREME tenderness of bilateral SI joints    + VENKAT bilaterallly  +Raymond bilaterally  +Thigh thrust bilaterally    Negative SLR bilaterally   Neurological: She is alert and oriented to person, place, and time. Gait abnormal.   Reflex Scores:       Patellar reflexes are 1+ on the right side and 1+ on the left side.  Reported dysthesia bilateral fingers(ALL) and feet   Skin: Skin is warm, dry and intact.   Psychiatric: She has a normal mood and affect. Her speech is normal and behavior is normal. Judgment and thought content normal. Cognition and memory are normal.   Nursing note and vitals reviewed.      Assessment/Plan   Ana Maria was seen today for back pain.    Diagnoses and all orders for this visit:    Chronic pain syndrome    SI (sacroiliac) joint dysfunction  -     Case Request  -     ketorolac (TORADOL) injection 30 mg; Inject 1 mL into the appropriate muscle as directed by prescriber 1 (One) Time.    B12 neuropathy (CMS/[facility])    Encounter for long-term (current) use of medications    Urinary tract infection without hematuria, site unspecified    Other orders  -     nitrofurantoin, macrocrystal-monohydrate, " (MACROBID) 100 MG capsule; Take 1 capsule by mouth 2 (Two) Times a Day.      --- The urine drug screen confirmation from 11-8-18 has been reviewed and the result is APPROPRIATE based on patient history and POLLY report  --- Continue with regimen. Does not need refill today. Patient appears stable with current regimen. No adverse effects. Regarding continuation of opioids, there is no evidence of aberrant behavior or any red flags.  The patient continues with appropriate response to opioid therapy. ADL's remain intact by self.   --- bilateral Si joint injections. No blood thinners. Reviewed the procedure at length with the patient.  Included in the review was expectations, complications, risk and benefits.The procedure was described in detail and the risks, benefits and alternatives were discussed with the patient (including but not limited to: bleeding, infection, nerve damage, worsening of pain, inability to perform injection, paralysis, seizures, and death) who agreed to proceed.  Discussed the potential for sedation if warranted/wanted.  The procedure will plan to be performed at [facility] with fluoroscopic guidance(unless ultrasound is indicated). Questions were answered and in a way the patient could understand.  Patient verbalized understanding and wishes to proceed.  This intervention will be ordered.  --- Toradol 30 mg IM in office.  --- Empiric treatment for UTI. Macrobid 100 mg BID for 3 days. Discussed medication with the patient.  Included in this discussion was the potential for side effects and adverse events.  Patient verbalized understanding and wished to proceed.  Prescription will be sent to pharmacy. Patient to call PCP if no improvement.  --- Follow-up 2 months or sooner if needed.     POLLY REPORT    As part of the patient's treatment plan, I am prescribing controlled substances. The patient has been made aware of appropriate use of such medications, including potential  risk of somnolence, limited ability to drive and/or work safely, and the potential for dependence or overdose. It has also bee made clear that these medications are for use by this patient only, without concomitant use of alcohol or other substances unless prescribed.     Patient has completed prescribing agreement detailing terms of continued prescribing of controlled substances, including monitoring POLLY reports, urine drug screening, and pill counts if necessary. The patient is aware that inappropriate use will results in cessation of prescribing such medications.    POLLY report has been reviewed and scanned into the patient's chart.    As the clinician, I personally reviewed the POLLY from 1-7-19 while the patient was in the office today.    History and physical exam exhibit continued safe and appropriate use of controlled substances.      EMR Dragon/Transcription disclaimer:   Much of this encounter note is an electronic transcription/translation of spoken language to printed text. The electronic translation of spoken language may permit erroneous, or at times, nonsensical words or phrases to be inadvertently transcribed; Although I have reviewed the note for such errors, some may still exist.

## 2019-01-31 RX ORDER — TRAMADOL HYDROCHLORIDE 50 MG/1
TABLET ORAL
Qty: 180 TABLET | Refills: 0 | Status: SHIPPED | OUTPATIENT
Start: 2019-01-31 | End: 2019-03-01 | Stop reason: SDUPTHER

## 2019-01-31 RX ORDER — CYCLOBENZAPRINE HCL 10 MG
TABLET ORAL
Qty: 90 TABLET | Refills: 3 | Status: SHIPPED | OUTPATIENT
Start: 2019-01-31 | End: 2019-06-05 | Stop reason: SDUPTHER

## 2019-03-01 RX ORDER — TRAMADOL HYDROCHLORIDE 50 MG/1
TABLET ORAL
Qty: 180 TABLET | Refills: 0 | Status: SHIPPED | OUTPATIENT
Start: 2019-03-01 | End: 2019-03-26 | Stop reason: SDUPTHER

## 2019-03-07 ENCOUNTER — OFFICE VISIT (OUTPATIENT)
Dept: PAIN MEDICINE | Facility: CLINIC | Age: 49
End: 2019-03-07

## 2019-03-07 VITALS
TEMPERATURE: 98.1 F | HEIGHT: 68 IN | SYSTOLIC BLOOD PRESSURE: 130 MMHG | HEART RATE: 98 BPM | DIASTOLIC BLOOD PRESSURE: 86 MMHG | BODY MASS INDEX: 22.91 KG/M2 | OXYGEN SATURATION: 98 % | RESPIRATION RATE: 15 BRPM | WEIGHT: 151.2 LBS

## 2019-03-07 DIAGNOSIS — G89.4 CHRONIC PAIN SYNDROME: Primary | ICD-10-CM

## 2019-03-07 DIAGNOSIS — Z79.899 ENCOUNTER FOR LONG-TERM (CURRENT) USE OF MEDICATIONS: ICD-10-CM

## 2019-03-07 DIAGNOSIS — G63 B12 NEUROPATHY (HCC): ICD-10-CM

## 2019-03-07 DIAGNOSIS — M53.3 SI (SACROILIAC) JOINT DYSFUNCTION: ICD-10-CM

## 2019-03-07 DIAGNOSIS — E53.8 B12 NEUROPATHY (HCC): ICD-10-CM

## 2019-03-07 PROCEDURE — 99214 OFFICE O/P EST MOD 30 MIN: CPT | Performed by: NURSE PRACTITIONER

## 2019-03-07 NOTE — PROGRESS NOTES
"CHIEF COMPLAINT  F/U back pain. Pt states it has been getting worse.    Subjective   Ana Maria Story is a 48 y.o. female  who presents to the office for follow-up.She has a history of chronic back pain. Reports it is worse since last office visit.    Complains of pain in her low back and extremities. Today her pain is 7/10VAS. Describes the pain as continuous aching and throbbing with intermittent sharp pain. Continues with Tramadol 50 mg 2 TID, Lyrica 200 mg TID and Flexeril 10 mg 2-3/day PRN (always takes at least one at night). Denies any side effects form the regimen. The regimen helps decrease her pain by 30-50%. ADL's by self. Denies any bowel or bladder changes. At last office visit, she was ordered to have SI joint injections but insurance denied this. We are appealing this.     Peripheral Neuropathy   This is a chronic problem. The current episode started more than 1 year ago. The problem occurs constantly. Progression since onset: unchanged since last office visit. Associated symptoms include arthralgias, fatigue, headaches (\"slight for a couple days\"), myalgias, numbness and weakness. Pertinent negatives include no chest pain, chills, congestion, coughing, diaphoresis, fever, joint swelling, nausea, neck pain or vomiting. Nothing (cold weather) aggravates the symptoms. She has tried NSAIDs and oral narcotics (Tramadol 50 mg 2 TID Lyrica 200 mg TID, Flexeril at HS, Elavil 150 mg HS) for the symptoms. The treatment provided moderate relief.   Back Pain   This is a chronic problem. The current episode started more than 1 year ago. The problem occurs constantly. Progression since onset: worse since last office visit. The pain is present in the lumbar spine, sacro-iliac and thoracic spine. The pain is at a severity of 7/10. The pain is moderate. Associated symptoms include headaches (\"slight for a couple days\"), numbness and weakness. Pertinent negatives include no bladder incontinence, bowel incontinence, " "chest pain, dysuria or fever. She has tried analgesics and muscle relaxant (SI injection--moderate to signficant relief) for the symptoms. The treatment provided moderate relief.      Procedure List  --8-14-18-- bilateral SI  --2-6-18-- bilateral SI  --10-10-17-- bilateral SI  --5-25-17-- bilateral SI  --4-25-17-- bilateral SI    The following portions of the patient's history were reviewed and updated as appropriate: allergies, current medications, past family history, past medical history, past social history, past surgical history and problem list.    Review of Systems   Constitutional: Positive for fatigue. Negative for chills, diaphoresis and fever.   HENT: Negative for congestion.    Respiratory: Negative for cough and shortness of breath.    Cardiovascular: Negative for chest pain and leg swelling.   Gastrointestinal: Positive for constipation (occ). Negative for bowel incontinence, diarrhea, nausea, rectal pain and vomiting.   Genitourinary: Negative for bladder incontinence, difficulty urinating, dyspareunia and dysuria.   Musculoskeletal: Positive for arthralgias, back pain and myalgias. Negative for joint swelling and neck pain.   Neurological: Positive for weakness, numbness and headaches (\"slight for a couple days\"). Negative for dizziness and light-headedness.   Psychiatric/Behavioral: Positive for sleep disturbance. Negative for agitation, confusion, hallucinations, self-injury and suicidal ideas. The patient is not nervous/anxious.        Vitals:    03/07/19 0922   BP: 130/86   Pulse: 98   Resp: 15   Temp: 98.1 °F (36.7 °C)   SpO2: 98%   Weight: 68.6 kg (151 lb 3.2 oz)   Height: 172.7 cm (67.99\")   PainSc:   7   PainLoc: Back     Objective   Physical Exam   Constitutional: She is oriented to person, place, and time. She appears well-developed and well-nourished. She is cooperative.   HENT:   Head: Normocephalic and atraumatic.   Nose: Nose normal.   Eyes: Conjunctivae and lids are normal.   Neck: " Trachea normal.   Cardiovascular: Normal rate.   Pulmonary/Chest: Effort normal.   Musculoskeletal:        Lumbar back: She exhibits tenderness and pain.   MODERATE TO EXTREME tenderness of bilateral SI joints    + VENKAT bilaterallly  +Raymond bilaterally  +Thigh thrust bilaterally    Negative SLR bilaterally   Neurological: She is alert and oriented to person, place, and time. Gait abnormal.   Reflex Scores:       Patellar reflexes are 1+ on the right side and 1+ on the left side.  Reported dysthesia bilateral fingers(ALL) and feet   Skin: Skin is warm, dry and intact.   Psychiatric: She has a normal mood and affect. Her speech is normal and behavior is normal. Judgment and thought content normal. Cognition and memory are normal.   Nursing note and vitals reviewed.      Assessment/Plan   Ana Maria was seen today for back pain.    Diagnoses and all orders for this visit:    Chronic pain syndrome    SI (sacroiliac) joint dysfunction    B12 neuropathy (CMS/[facility])    Encounter for long-term (current) use of medications      --- The urine drug screen confirmation from 11-8-18 has been reviewed and the result is APPROPRIATE based on patient history and POLLY report  --- Continue with regimen. Not due for refill.  Patient appears stable with current regimen. No adverse effects. Regarding continuation of opioids, there is no evidence of aberrant behavior or any red flags.  The patient continues with appropriate response to opioid therapy. ADL's remain intact by self.   --- Proceed with SI joint injection when authorized by insurance.  --- Follow-up 2 months or sooner if needed.       POLLY REPORT    As part of the patient's treatment plan, I am prescribing controlled substances. The patient has been made aware of appropriate use of such medications, including potential risk of somnolence, limited ability to drive and/or work safely, and the potential for dependence or overdose. It has also bee made clear that these  medications are for use by this patient only, without concomitant use of alcohol or other substances unless prescribed.     Patient has completed prescribing agreement detailing terms of continued prescribing of controlled substances, including monitoring POLLY reports, urine drug screening, and pill counts if necessary. The patient is aware that inappropriate use will results in cessation of prescribing such medications.    POLLY report has been reviewed and scanned into the patient's chart.    As the clinician, I personally reviewed the POLLY from 3-6-19 while the patient was in the office today.    History and physical exam exhibit continued safe and appropriate use of controlled substances.      EMR Dragon/Transcription disclaimer:   Much of this encounter note is an electronic transcription/translation of spoken language to printed text. The electronic translation of spoken language may permit erroneous, or at times, nonsensical words or phrases to be inadvertently transcribed; Although I have reviewed the note for such errors, some may still exist.

## 2019-03-21 ENCOUNTER — DOCUMENTATION (OUTPATIENT)
Dept: PAIN MEDICINE | Facility: CLINIC | Age: 49
End: 2019-03-21

## 2019-03-21 ENCOUNTER — OUTSIDE FACILITY SERVICE (OUTPATIENT)
Dept: PAIN MEDICINE | Facility: CLINIC | Age: 49
End: 2019-03-21

## 2019-03-21 PROCEDURE — 27096 INJECT SACROILIAC JOINT: CPT | Performed by: ANESTHESIOLOGY

## 2019-03-23 NOTE — PROGRESS NOTES
Bilateral Sacroiliac Joint Injection  [facility]      PREOPERATIVE DIAGNOSIS:   Sacroiliac joint dysfunction, bilateral    POSTOPERATIVE DIAGNOSIS:  Sacroiliac joint dysfunction, bilateral    PROCEDURE:  Sacroiliac Joint Injection, Bilaterally, with fluoroscopic guidance    PRE-PROCEDURE DISCUSSION WITH PATIENT:    Risks and complications were discussed with the patient prior to starting the procedure and informed consent was obtained.  We discussed various topics including but not limited to bleeding, infection, injury, postprocedural site soreness, painful flareup, worsening of clinical picture, paralysis, coma, and death.     SURGEON:  Epifanio Plaza MD    REASON FOR PROCEDURE:    Patient has pain consistent with SI pathology on history and physical exam. Previous clinically significant therapeutic effect of SI joint injection.      SEDATION:  Patient declined administration of moderate sedation    ANESTHETIC AGENT:  Marcaine 0.5%  STEROID AGENT:  Methylprednisolone (DEPO MEDROL) 80mg/ml    DESCRIPTON OF PROCEDURE:  After obtaining informed consent, IV access was not obtained in the preoperative area.  The patient was transported to the operative suite and placed in the prone position with a pillow under the pelvic area. EKG, blood pressure, and pulse oximeter were monitored. The lumbosacral area was prepped with Chloraprep and draped in a sterile fashion. Under fluoroscopic guidance the inferior most portion of the right SI joint was identified. The overlying skin and subcutaneous tissue was anesthetized with 1% lidocaine. A 22-gauge spinal needle was introduced from the inferior most portion of the joint into the RIGHT SI joint under fluoroscopic guidance in the AP dimension with slight oblique rotation to the contralateral side.  Aspiration was negative.  After confirming the position of the needle with fluoroscopy, 1 mL of Omnipaque was injected and after seeing appropriate spread into  the joint a total of 2mL Marcaine, with the steroid, was injected very slowly.  Needle was removed intact.  A similar procedure was performed on the LEFT side.   Vital signs remained stable.  The onset of analgesia was noted.      ESTIMATED BLOOD LOSS:  minimal  SPECIMENS:  None  COMPLICATIONS:  No complications were noted., There was no indication of vascular uptake on live injection of contrast dye. and The patient did not have any signs of postprocedure numbness nor weakness.    TOLERANCE & DISCHARGE CONDITION:    The patient tolerated the procedure well.  The patient was transported to the recovery area without difficulties.  The patient was discharged to home under the care of family in stable and satisfactory condition.    PLAN OF CARE:  1. The patient was given our standard instruction sheet and will resume all medications as per the medication reconciliation sheet.  2. The patient will Return to clinic 6 wks.  3. The patient is instructed to keep a pain log hourly for 8 hours after the procedure.

## 2019-03-26 RX ORDER — TRAMADOL HYDROCHLORIDE 50 MG/1
TABLET ORAL
Qty: 180 TABLET | Refills: 0 | Status: SHIPPED | OUTPATIENT
Start: 2019-03-26 | End: 2019-05-03 | Stop reason: SDUPTHER

## 2019-05-05 RX ORDER — TRAMADOL HYDROCHLORIDE 50 MG/1
TABLET ORAL
Qty: 180 TABLET | Refills: 0 | Status: SHIPPED | OUTPATIENT
Start: 2019-05-05 | End: 2019-06-05 | Stop reason: SDUPTHER

## 2019-05-07 ENCOUNTER — RESULTS ENCOUNTER (OUTPATIENT)
Dept: PAIN MEDICINE | Facility: CLINIC | Age: 49
End: 2019-05-07

## 2019-05-07 ENCOUNTER — OFFICE VISIT (OUTPATIENT)
Dept: PAIN MEDICINE | Facility: CLINIC | Age: 49
End: 2019-05-07

## 2019-05-07 VITALS
HEIGHT: 55 IN | TEMPERATURE: 97.7 F | OXYGEN SATURATION: 96 % | WEIGHT: 151 LBS | RESPIRATION RATE: 16 BRPM | BODY MASS INDEX: 34.94 KG/M2 | SYSTOLIC BLOOD PRESSURE: 145 MMHG | HEART RATE: 95 BPM | DIASTOLIC BLOOD PRESSURE: 91 MMHG

## 2019-05-07 DIAGNOSIS — M53.3 SACROILIAC JOINT DYSFUNCTION OF BOTH SIDES: ICD-10-CM

## 2019-05-07 DIAGNOSIS — G64 DISORDER OF PERIPHERAL NERVOUS SYSTEM: ICD-10-CM

## 2019-05-07 DIAGNOSIS — G63 B12 NEUROPATHY (HCC): ICD-10-CM

## 2019-05-07 DIAGNOSIS — M53.3 SI (SACROILIAC) JOINT DYSFUNCTION: ICD-10-CM

## 2019-05-07 DIAGNOSIS — G89.4 CHRONIC PAIN SYNDROME: Primary | ICD-10-CM

## 2019-05-07 DIAGNOSIS — Z79.899 ENCOUNTER FOR LONG-TERM (CURRENT) USE OF MEDICATIONS: ICD-10-CM

## 2019-05-07 DIAGNOSIS — G89.4 CHRONIC PAIN SYNDROME: ICD-10-CM

## 2019-05-07 DIAGNOSIS — E53.8 B12 NEUROPATHY (HCC): ICD-10-CM

## 2019-05-07 PROCEDURE — 99214 OFFICE O/P EST MOD 30 MIN: CPT | Performed by: NURSE PRACTITIONER

## 2019-05-07 PROCEDURE — 80305 DRUG TEST PRSMV DIR OPT OBS: CPT | Performed by: NURSE PRACTITIONER

## 2019-05-07 RX ORDER — OMEPRAZOLE 20 MG/1
20 CAPSULE, DELAYED RELEASE ORAL DAILY
Qty: 30 CAPSULE | Refills: 1 | Status: SHIPPED | OUTPATIENT
Start: 2019-05-07 | End: 2021-09-07

## 2019-05-07 NOTE — PROGRESS NOTES
"CHIEF COMPLAINT  Pt is here to f/u on back injection pt sts was 80% gone. Pt sts the pain has increased.    Subjective   Ana Maria Story is a 48 y.o. female  who presents to the office for follow-up of procedure.  She completed a bilateral Si joint injection   on  3-21-19 performed by Dr. FRENCH for management of LOW BACK PAIN. Patient reports 80% relief from the procedure for 2 weeks.  She returned to pre-procedure level after that.    Complains of pain in her low back and extremities. Today her pain is 6/10VAS. Describes the pain as a continuous aching and throbbing with intermittent burning. Continues with Tramadol 50 mg 2 TID, Lyrica 200 mg TID and Flexeril 10 mg 2-3/day PRN (always takes at least one at night). Denies any side effects form the regimen. The regimen helps decrease her pain by 50%. ADL's by self. Denies any bowel or bladder incontinence.    Needs to have tooth removed  Still has custody 8 month old grandson.    Has a history of hiatal hernia and Shore's esophagus.  Was on Omeprazole 10 mg daily. Her GI doctor will not fill. Is requesting a refill. Has an appointment date in 1.5 months.   Peripheral Neuropathy   This is a chronic problem. The current episode started more than 1 year ago. The problem occurs constantly. Progression since onset: unchanged since last office visit. Associated symptoms include arthralgias, headaches (\"slight for a couple days\"), myalgias, numbness and weakness. Pertinent negatives include no chest pain, chills, congestion, coughing, diaphoresis, fatigue, fever, joint swelling, nausea, neck pain or vomiting. Nothing (cold weather) aggravates the symptoms. She has tried NSAIDs and oral narcotics (Tramadol 50 mg 2 TID Lyrica 200 mg TID, Flexeril at HS, Elavil 150 mg HS) for the symptoms. The treatment provided moderate relief.   Back Pain   This is a chronic problem. The current episode started more than 1 year ago. The problem occurs constantly. Progression since onset: " "worse since last office visit. The pain is present in the lumbar spine, sacro-iliac and thoracic spine. The pain is at a severity of 6/10. The pain is moderate. Associated symptoms include headaches (\"slight for a couple days\"), numbness and weakness. Pertinent negatives include no bladder incontinence, bowel incontinence, chest pain, dysuria or fever. She has tried analgesics and muscle relaxant (SI injection--moderate to signficant relief) for the symptoms. The treatment provided moderate relief.      Procedure List  --8-14-18-- bilateral SI  --2-6-18-- bilateral SI  --10-10-17-- bilateral SI  --5-25-17-- bilateral SI  --4-25-17-- bilateral SI    PEG Assessment   What number best describes your pain on average in the past week?7  What number best describes how, during the past week, pain has interfered with your enjoyment of life?6  What number best describes how, during the past week, pain has interfered with your general activity?  7    The following portions of the patient's history were reviewed and updated as appropriate: allergies, current medications, past family history, past medical history, past social history, past surgical history and problem list.    Review of Systems   Constitutional: Negative for chills, diaphoresis, fatigue and fever.   HENT: Negative for congestion.    Respiratory: Negative for cough and shortness of breath.    Cardiovascular: Negative for chest pain.   Gastrointestinal: Negative for bowel incontinence, nausea and vomiting.   Genitourinary: Negative for bladder incontinence, difficulty urinating and dysuria.   Musculoskeletal: Positive for arthralgias, back pain and myalgias. Negative for joint swelling and neck pain.   Neurological: Positive for weakness, numbness and headaches (\"slight for a couple days\"). Negative for dizziness.   Psychiatric/Behavioral: Negative for suicidal ideas.       Vitals:    05/07/19 0918   BP: 145/91   Pulse: 95   Resp: 16   Temp: 97.7 °F (36.5 °C) " "  SpO2: 96%   Weight: 68.5 kg (151 lb)   Height: 68 cm (26.77\")   PainSc:   6   PainLoc: Back     Objective   Physical Exam   Constitutional: She is oriented to person, place, and time. She appears well-developed and well-nourished. She is cooperative.   HENT:   Head: Normocephalic and atraumatic.   Nose: Nose normal.   Eyes: Conjunctivae and lids are normal.   Neck: Trachea normal.   Cardiovascular: Normal rate.   Pulmonary/Chest: Effort normal.   Musculoskeletal:        Lumbar back: She exhibits tenderness and pain.   MODERATE TO EXTREME tenderness of bilateral SI joints    + VENKAT bilaterallly  +Raymond bilaterally  +Thigh thrust bilaterally    Negative SLR bilaterally   Neurological: She is alert and oriented to person, place, and time. Gait abnormal.   Reported dysthesia bilateral fingers(ALL) and feet   Skin: Skin is warm, dry and intact.   Psychiatric: She has a normal mood and affect. Her speech is normal and behavior is normal. Judgment and thought content normal. Cognition and memory are normal.   Nursing note and vitals reviewed.      Assessment/Plan   Ana Maria was seen today for back pain.    Diagnoses and all orders for this visit:    Chronic pain syndrome    Sacroiliac joint dysfunction of both sides  -     Case Request    SI (sacroiliac) joint dysfunction  -     Case Request    Disorder of peripheral nervous system    B12 neuropathy (CMS/HCC)    Encounter for long-term (current) use of medications    Other orders  -     omeprazole (priLOSEC) 20 MG capsule; Take 1 capsule by mouth Daily.      --- Routine UDS in office today as part of monitoring requirements for controlled substances.  The specimen was viewed and the immunoassay result reviewed and is +TCA(APPROPRIATE, TRAMADOL IS NEGATIVE).  This specimen will be sent to [facility] laboratory for confirmation.     --- Continue with tramadol. Patient appears stable with current regimen. No adverse effects. Regarding continuation of opioids, there is no " evidence of aberrant behavior or any red flags.  The patient continues with appropriate response to opioid therapy. ADL's remain intact by self.   --- Repeat bilateral Si joints. Reviewed the procedure at length with the patient.  Included in the review was expectations, complications, risk and benefits.The procedure was described in detail and the risks, benefits and alternatives were discussed with the patient (including but not limited to: bleeding, infection, nerve damage, worsening of pain, inability to perform injection, paralysis, seizures, and death) who agreed to proceed.  Discussed the potential for sedation if warranted/wanted.  The procedure will plan to be performed at [facility] with fluoroscopic guidance(unless ultrasound is indicated). Questions were answered and in a way the patient could understand.  Patient verbalized understanding and wishes to proceed.  This intervention will be ordered.  --- 2 month prescriptions of Omezprazole until patient seen by GI.  --- Follow-up 2 months or sooner if needed.     POLLY REPORT    As part of the patient's treatment plan, I am prescribing controlled substances. The patient has been made aware of appropriate use of such medications, including potential risk of somnolence, limited ability to drive and/or work safely, and the potential for dependence or overdose. It has also bee made clear that these medications are for use by this patient only, without concomitant use of alcohol or other substances unless prescribed.     Patient has completed prescribing agreement detailing terms of continued prescribing of controlled substances, including monitoring POLLY reports, urine drug screening, and pill counts if necessary. The patient is aware that inappropriate use will results in cessation of prescribing such medications.    POLLY report has been reviewed and scanned into the patient's chart.    As the clinician, I personally reviewed the  POLLY from 5-6-19 while the patient was in the office today.    History and physical exam exhibit continued safe and appropriate use of controlled substances.       EMR Dragon/Transcription disclaimer:   Much of this encounter note is an electronic transcription/translation of spoken language to printed text. The electronic translation of spoken language may permit erroneous, or at times, nonsensical words or phrases to be inadvertently transcribed; Although I have reviewed the note for such errors, some may still exist.

## 2019-05-13 ENCOUNTER — TELEPHONE (OUTPATIENT)
Dept: PAIN MEDICINE | Facility: CLINIC | Age: 49
End: 2019-05-13

## 2019-05-13 NOTE — TELEPHONE ENCOUNTER
Pt dental office called in today to let us know of upcoming dental extraction. Office will put pt on hydrocodone, pt currently on tramadol with our clinic. Spoke with Felicia she said pt cannot be on tramadol while on hydrocodone, told dental office to tell pt.

## 2019-06-05 RX ORDER — CYCLOBENZAPRINE HCL 10 MG
TABLET ORAL
Qty: 90 TABLET | Refills: 3 | Status: SHIPPED | OUTPATIENT
Start: 2019-06-05 | End: 2019-12-05

## 2019-06-05 RX ORDER — TRAMADOL HYDROCHLORIDE 50 MG/1
TABLET ORAL
Qty: 180 TABLET | Refills: 0 | Status: SHIPPED | OUTPATIENT
Start: 2019-06-05 | End: 2019-07-08 | Stop reason: ALTCHOICE

## 2019-07-02 RX ORDER — OMEPRAZOLE 20 MG/1
CAPSULE, DELAYED RELEASE ORAL
Qty: 30 CAPSULE | Refills: 1 | OUTPATIENT
Start: 2019-07-02

## 2019-07-08 ENCOUNTER — OFFICE VISIT (OUTPATIENT)
Dept: PAIN MEDICINE | Facility: CLINIC | Age: 49
End: 2019-07-08

## 2019-07-08 VITALS
SYSTOLIC BLOOD PRESSURE: 160 MMHG | TEMPERATURE: 98 F | RESPIRATION RATE: 16 BRPM | DIASTOLIC BLOOD PRESSURE: 94 MMHG | WEIGHT: 152 LBS | OXYGEN SATURATION: 99 % | HEART RATE: 101 BPM | BODY MASS INDEX: 23.04 KG/M2 | HEIGHT: 68 IN

## 2019-07-08 DIAGNOSIS — Z79.899 ENCOUNTER FOR LONG-TERM (CURRENT) USE OF MEDICATIONS: ICD-10-CM

## 2019-07-08 DIAGNOSIS — G64 DISORDER OF PERIPHERAL NERVOUS SYSTEM: ICD-10-CM

## 2019-07-08 DIAGNOSIS — G89.4 CHRONIC PAIN SYNDROME: Primary | ICD-10-CM

## 2019-07-08 DIAGNOSIS — M53.3 SACROILIAC JOINT DYSFUNCTION OF BOTH SIDES: ICD-10-CM

## 2019-07-08 PROCEDURE — 99214 OFFICE O/P EST MOD 30 MIN: CPT | Performed by: NURSE PRACTITIONER

## 2019-07-08 RX ORDER — HYDROCODONE BITARTRATE AND ACETAMINOPHEN 5; 325 MG/1; MG/1
1 TABLET ORAL EVERY 6 HOURS PRN
Qty: 120 TABLET | Refills: 0 | Status: SHIPPED | OUTPATIENT
Start: 2019-07-08 | End: 2019-08-06 | Stop reason: SDUPTHER

## 2019-07-08 NOTE — PROGRESS NOTES
"CHIEF COMPLAINT  F/U back pain- patient states that her pain has worsened since her last visit.     Subjective   Ana Maria Story is a 48 y.o. female  who presents to the office for follow-up.She has a history of chronic back pain and extremity pain. Reports her back pain is worse since last office visit. At last office visit, a SI joint injection was ordered but this was denied by insurance.   Currently has custody of 10 month old grandson. Hoping daughter will adopt him. Goes to court tomorrow for this.     Complains of pain in her low back and extremities. Today her pain is 8/10VAS. Describes the pain as continuous aching and throbbing with intermittent sharp pain. Pain increases with walking/standing/lifting, activity; pain decreases with medication, rest and procedure. Continues with Tramadol 50 mg 2 TID, Lyrica 200 mg TID and Flexeril 10 mg 2-3/day PRN (always takes at least one at night). Denies any side effects form the regimen. The regimen helps decrease her pain by 20%.\"it's just not cutting it.\" Had recent prescription for Hydrocodone 5/325 for tooth extraction. No side effects from this.  ADL's by self. Denies any bowel or bladder incontinence. Is actively exercising nightly, walking neighborhood with grandson in [facility](can't carry too far).    Peripheral Neuropathy   This is a chronic problem. The current episode started more than 1 year ago. The problem occurs constantly. Progression since onset: unchanged since last office visit. Associated symptoms include arthralgias, fatigue, myalgias, numbness and weakness. Pertinent negatives include no abdominal pain, chest pain, chills, congestion, coughing, diaphoresis, fever, headaches (\"slight for a couple days\"), joint swelling, nausea, neck pain or vomiting. Nothing (cold weather) aggravates the symptoms. She has tried NSAIDs and oral narcotics (Tramadol 50 mg 2 TID Lyrica 200 mg TID, Flexeril at HS, Elavil 150 mg HS) for the symptoms. The treatment " "provided moderate relief.   Back Pain   This is a chronic problem. The current episode started more than 1 year ago. The problem occurs constantly. Progression since onset: worse since last office visit. The pain is present in the lumbar spine, sacro-iliac and thoracic spine. The pain is at a severity of 8/10. The pain is moderate. Associated symptoms include numbness and weakness. Pertinent negatives include no abdominal pain, bladder incontinence, bowel incontinence, chest pain, dysuria, fever or headaches (\"slight for a couple days\"). She has tried analgesics and muscle relaxant (SI injection--moderate to signficant relief) for the symptoms. The treatment provided moderate relief.      Procedure List  -- 3-21-19-- bilateral SI  --8-14-18-- bilateral SI  --2-6-18-- bilateral SI  --10-10-17-- bilateral SI  --5-25-17-- bilateral SI  --4-25-17-- bilateral SI        PEG Assessment   What number best describes your pain on average in the past week?8  What number best describes how, during the past week, pain has interfered with your enjoyment of life?8  What number best describes how, during the past week, pain has interfered with your general activity?  9    The following portions of the patient's history were reviewed and updated as appropriate: allergies, current medications, past family history, past medical history, past social history, past surgical history and problem list.    Review of Systems   Constitutional: Positive for activity change (decreased) and fatigue. Negative for chills, diaphoresis and fever.   HENT: Negative for congestion.    Respiratory: Negative for cough, chest tightness and shortness of breath.    Cardiovascular: Negative for chest pain.   Gastrointestinal: Positive for constipation (r/t IBS) and diarrhea (r/t IBS). Negative for abdominal pain, bowel incontinence, nausea and vomiting.   Genitourinary: Negative for bladder incontinence, difficulty urinating and dysuria.   Musculoskeletal: " "Positive for arthralgias, back pain and myalgias. Negative for joint swelling and neck pain.   Neurological: Positive for weakness and numbness. Negative for dizziness, light-headedness and headaches (\"slight for a couple days\").   Psychiatric/Behavioral: Positive for sleep disturbance. Negative for suicidal ideas.       Vitals:    07/08/19 0902   BP: 160/94   Pulse: 101   Resp: 16   Temp: 98 °F (36.7 °C)   SpO2: 99%   Weight: 68.9 kg (152 lb)   Height: 172.7 cm (68\")   PainSc:   8   PainLoc: Back     Objective   Physical Exam        Assessment/Plan   Ana Maria was seen today for back pain.    Diagnoses and all orders for this visit:    Chronic pain syndrome    Disorder of peripheral nervous system    Sacroiliac joint dysfunction of both sides  -     Case Request    Encounter for long-term (current) use of medications    Other orders  -     HYDROcodone-acetaminophen (NORCO) 5-325 MG per tablet; Take 1 tablet by mouth Every 6 (Six) Hours As Needed for Severe Pain .      --- The urine drug screen confirmation from 5-7-19 has been reviewed and the result is APPROPRIATE(for hydrocodone but lacking Lyrica)(she reports she had not taken it in over 12 hours at last office visit, does not like to take before she drives) based on patient history and POLLY report  --- Discontinue Tramadol.  --- Trial of Hydrocodone 5/325 q6hrs PRN. Discussed medication with the patient.  Included in this discussion was the potential for side effects and adverse events.  Patient verbalized understanding and wished to proceed.  Prescription will be sent to pharmacy.  --- Repeat bilateral SI joint injections. No blood thinners. Reviewed the procedure at length with the patient.  Included in the review was expectations, complications, risk and benefits.The procedure was described in detail and the risks, benefits and alternatives were discussed with the patient (including but not limited to: bleeding, infection, nerve damage, worsening of pain, " inability to perform injection, paralysis, seizures, and death) who agreed to proceed.  Discussed the potential for sedation if warranted/wanted.  The procedure will plan to be performed at [facility] with fluoroscopic guidance(unless ultrasound is indicated). Questions were answered and in a way the patient could understand.  Patient verbalized understanding and wishes to proceed.  This intervention will be ordered.  Discussed with patient that all procedures are part of a multimodal plan of care and include either formal PT or a home exercise program.    --- Follow-up 1 month or sooner if needed.       POLLY REPORT    As part of the patient's treatment plan, I am prescribing controlled substances. The patient has been made aware of appropriate use of such medications, including potential risk of somnolence, limited ability to drive and/or work safely, and the potential for dependence or overdose. It has also bee made clear that these medications are for use by this patient only, without concomitant use of alcohol or other substances unless prescribed.     Patient has completed prescribing agreement detailing terms of continued prescribing of controlled substances, including monitoring POLLY reports, urine drug screening, and pill counts if necessary. The patient is aware that inappropriate use will results in cessation of prescribing such medications.    POLLY report has been reviewed and scanned into the patient's chart.    As the clinician, I personally reviewed the POLLY from 7-5-19 while the patient was in the office today.    History and physical exam exhibit continued safe and appropriate use of controlled substances.      EMR Dragon/Transcription disclaimer:   Much of this encounter note is an electronic transcription/translation of spoken language to printed text. The electronic translation of spoken language may permit erroneous, or at times, nonsensical words or phrases to be  inadvertently transcribed; Although I have reviewed the note for such errors, some may still exist.

## 2019-08-06 ENCOUNTER — OFFICE VISIT (OUTPATIENT)
Dept: PAIN MEDICINE | Facility: CLINIC | Age: 49
End: 2019-08-06

## 2019-08-06 VITALS
BODY MASS INDEX: 22.43 KG/M2 | HEIGHT: 68 IN | TEMPERATURE: 97.3 F | RESPIRATION RATE: 20 BRPM | OXYGEN SATURATION: 98 % | DIASTOLIC BLOOD PRESSURE: 90 MMHG | SYSTOLIC BLOOD PRESSURE: 136 MMHG | WEIGHT: 148 LBS | HEART RATE: 107 BPM

## 2019-08-06 DIAGNOSIS — Z79.899 ENCOUNTER FOR LONG-TERM (CURRENT) USE OF MEDICATIONS: ICD-10-CM

## 2019-08-06 DIAGNOSIS — E53.8 B12 NEUROPATHY (HCC): ICD-10-CM

## 2019-08-06 DIAGNOSIS — G89.4 CHRONIC PAIN SYNDROME: Primary | ICD-10-CM

## 2019-08-06 DIAGNOSIS — M53.3 SI (SACROILIAC) JOINT DYSFUNCTION: ICD-10-CM

## 2019-08-06 DIAGNOSIS — G63 B12 NEUROPATHY (HCC): ICD-10-CM

## 2019-08-06 PROCEDURE — 99214 OFFICE O/P EST MOD 30 MIN: CPT | Performed by: NURSE PRACTITIONER

## 2019-08-06 RX ORDER — HYDROCODONE BITARTRATE AND ACETAMINOPHEN 5; 325 MG/1; MG/1
1 TABLET ORAL EVERY 6 HOURS PRN
Qty: 120 TABLET | Refills: 0 | Status: SHIPPED | OUTPATIENT
Start: 2019-08-06 | End: 2019-08-28 | Stop reason: SDUPTHER

## 2019-08-06 NOTE — PROGRESS NOTES
"CHIEF COMPLAINT  F/u back pain. Pt sts pain has improved since last ov. Pt getting better pain control with hydrocodone. Pt has history of neuropathy, c/o numbness on right side of abdomen, has been occurring for a couple months now.     Subjective   Ana Maria Story is a 48 y.o. female  who presents to the office for follow-up.She has a history of chronic back pain and extremity pain. Reports her pain is improved since last office visit. At last office visit, Tramadol was discontinued and patient was given a trial of Hydrocodone 5/325 q6hrs PRN. Has noticed improvement since opioid rotation. \"I haven't had near the pain.\"  Also at last office visit, she was ordered to have a repeat SI joint injection. This was denied by her insurance stating there was no documentation of her being in an active exercise program. However, her last office visit, she was walking nightly and exercising.    Complains of pain in her low back and extremities. Today her pain is 5/10VAS. Describes the pain as nearly continuous aching and burning. Pain increases with walking, standing, activity; pain decreases with medication, rest and procedures. Is currently taking Hydrocodone 5/325 4/day, Lyrica 200 mg TID and Flexeril 10 mg 2-3/day PRN (always takes at least one at night). Denies any side effects form the regimen. The regimen helps decrease her pain by 50%. ADL's by self. Denies any bowel or bladder changes. Still has custody of almost 1 year old grandson. Is extremely active with him.    Peripheral Neuropathy   This is a chronic problem. The current episode started more than 1 year ago. The problem occurs constantly. Progression since onset: unchanged since last office visit. Associated symptoms include arthralgias, fatigue, myalgias (fibromyalgia), numbness (bilat legs) and weakness (bilat legs). Pertinent negatives include no abdominal pain, chest pain, chills, congestion, coughing, diaphoresis, fever, headaches (\"slight for a couple " "days\"), joint swelling, nausea, neck pain or vomiting. Nothing (cold weather) aggravates the symptoms. She has tried NSAIDs and oral narcotics (Tramadol 50 mg 2 TID Lyrica 200 mg TID, Flexeril at HS, Elavil 150 mg HS) for the symptoms. The treatment provided moderate relief.   Back Pain   This is a chronic problem. The current episode started more than 1 year ago. The problem occurs constantly. Progression since onset: improved since last office visit. The pain is present in the lumbar spine, sacro-iliac and thoracic spine. The pain is at a severity of 5/10. The pain is moderate. Associated symptoms include numbness (bilat legs) and weakness (bilat legs). Pertinent negatives include no abdominal pain, bladder incontinence, bowel incontinence, chest pain, dysuria, fever or headaches (\"slight for a couple days\"). She has tried analgesics and muscle relaxant (SI injection--moderate to signficant relief) for the symptoms. The treatment provided moderate relief.      Procedure List  -- 3-21-19-- bilateral SI  --8-14-18-- bilateral SI  --2-6-18-- bilateral SI  --10-10-17-- bilateral SI  --5-25-17-- bilateral SI  --4-25-17-- bilateral SI        PEG Assessment   What number best describes your pain on average in the past week?6  What number best describes how, during the past week, pain has interfered with your enjoyment of life?7  What number best describes how, during the past week, pain has interfered with your general activity?  6    The following portions of the patient's history were reviewed and updated as appropriate: allergies, current medications, past family history, past medical history, past social history, past surgical history and problem list.    Review of Systems   Constitutional: Positive for activity change (inc) and fatigue. Negative for chills, diaphoresis and fever.   HENT: Negative for congestion.    Respiratory: Negative for cough, chest tightness and shortness of breath.    Cardiovascular: Negative " "for chest pain.   Gastrointestinal: Positive for constipation (r/t IBS) and diarrhea (r/t IBS). Negative for abdominal pain, bowel incontinence, nausea and vomiting.   Genitourinary: Negative for bladder incontinence, difficulty urinating and dysuria.   Musculoskeletal: Positive for arthralgias, back pain and myalgias (fibromyalgia). Negative for joint swelling and neck pain.   Neurological: Positive for weakness (bilat legs) and numbness (bilat legs). Negative for dizziness, light-headedness and headaches (\"slight for a couple days\").   Psychiatric/Behavioral: Positive for sleep disturbance. Negative for suicidal ideas.       Vitals:    08/06/19 0836   BP: 136/90   Pulse: 107   Resp: 20   Temp: 97.3 °F (36.3 °C)   SpO2: 98%   Weight: 67.1 kg (148 lb)   Height: 172.7 cm (68\")   PainSc:   5   PainLoc: Back     Objective   Physical Exam   Constitutional: She is oriented to person, place, and time. She appears well-developed and well-nourished. She is cooperative.   HENT:   Head: Normocephalic and atraumatic.   Nose: Nose normal.   Eyes: Conjunctivae and lids are normal.   Neck: Trachea normal.   Cardiovascular: Normal rate.   Pulmonary/Chest: Effort normal.   Musculoskeletal:        Lumbar back: She exhibits tenderness and pain.   MODERATE TO EXTREME tenderness of bilateral SI joints    + VENKAT bilaterallly  +Raymond bilaterally  +Thigh thrust bilaterally    Negative SLR bilaterally   Neurological: She is alert and oriented to person, place, and time. Gait abnormal.   Reported dysthesia bilateral fingers(ALL) and feet   Skin: Skin is warm, dry and intact.   Psychiatric: She has a normal mood and affect. Her speech is normal and behavior is normal. Judgment and thought content normal. Cognition and memory are normal.   Nursing note and vitals reviewed.      Assessment/Plan   Ana Maria was seen today for back pain.    Diagnoses and all orders for this visit:    Chronic pain syndrome    B12 neuropathy (CMS/HCC)    SI " (sacroiliac) joint dysfunction    Encounter for long-term (current) use of medications    Other orders  -     HYDROcodone-acetaminophen (NORCO) 5-325 MG per tablet; Take 1 tablet by mouth Every 6 (Six) Hours As Needed for Severe Pain .      --- The urine drug screen confirmation from 5-7-19 has been reviewed and the result is APPROPRIATE based on patient history and POLLY report  --- Refill Hydrocodone. Patient appears stable with current regimen. No adverse effects. Regarding continuation of opioids, there is no evidence of aberrant behavior or any red flags.  The patient continues with appropriate response to opioid therapy. ADL's remain intact by self.   --- Proceed with SI joint injection when authorized by insurance. Patient needs to sign authorization for appeal.   --- Follow-up 2 months or sooner if needed.     POLLY REPORT    As part of the patient's treatment plan, I am prescribing controlled substances. The patient has been made aware of appropriate use of such medications, including potential risk of somnolence, limited ability to drive and/or work safely, and the potential for dependence or overdose. It has also bee made clear that these medications are for use by this patient only, without concomitant use of alcohol or other substances unless prescribed.     Patient has completed prescribing agreement detailing terms of continued prescribing of controlled substances, including monitoring POLLY reports, urine drug screening, and pill counts if necessary. The patient is aware that inappropriate use will results in cessation of prescribing such medications.    POLLY report has been reviewed and scanned into the patient's chart.    As the clinician, I personally reviewed the POLLY from 8-5-19 while the patient was in the office today.    History and physical exam exhibit continued safe and appropriate use of controlled substances.      EMR Dragon/Transcription disclaimer:   Much of this encounter note  is an electronic transcription/translation of spoken language to printed text. The electronic translation of spoken language may permit erroneous, or at times, nonsensical words or phrases to be inadvertently transcribed; Although I have reviewed the note for such errors, some may still exist.

## 2019-08-28 NOTE — TELEPHONE ENCOUNTER
Medication Refill Request    Date of phone call: 19    Medication being requested: Norco 5-325 mg    si tab po q 6 hrs prn  Qty: 120    Date of last visit: 19    Date of last refill: 19    POLLY up to date?: yes    Next Follow up?: 10/3/19    Any new pertinent information? (i.e, new medication allergies, new use of medications, change in patient's health or condition, non-compliance or inconsistency with prescribing agreement?):

## 2019-08-29 RX ORDER — HYDROCODONE BITARTRATE AND ACETAMINOPHEN 5; 325 MG/1; MG/1
1 TABLET ORAL EVERY 6 HOURS PRN
Qty: 120 TABLET | Refills: 0 | Status: SHIPPED | OUTPATIENT
Start: 2019-08-29 | End: 2019-10-08 | Stop reason: SDUPTHER

## 2019-09-30 RX ORDER — HYDROCODONE BITARTRATE AND ACETAMINOPHEN 5; 325 MG/1; MG/1
1 TABLET ORAL EVERY 6 HOURS PRN
Qty: 120 TABLET | Refills: 0 | OUTPATIENT
Start: 2019-09-30

## 2019-10-08 ENCOUNTER — OFFICE VISIT (OUTPATIENT)
Dept: PAIN MEDICINE | Facility: CLINIC | Age: 49
End: 2019-10-08

## 2019-10-08 VITALS
WEIGHT: 148.4 LBS | RESPIRATION RATE: 16 BRPM | HEIGHT: 68 IN | BODY MASS INDEX: 22.49 KG/M2 | SYSTOLIC BLOOD PRESSURE: 148 MMHG | DIASTOLIC BLOOD PRESSURE: 87 MMHG | OXYGEN SATURATION: 97 % | TEMPERATURE: 97.6 F | HEART RATE: 105 BPM

## 2019-10-08 DIAGNOSIS — G62.9 NEUROPATHY: ICD-10-CM

## 2019-10-08 DIAGNOSIS — G89.4 CHRONIC PAIN SYNDROME: Primary | ICD-10-CM

## 2019-10-08 DIAGNOSIS — M53.3 SACROILIAC JOINT DYSFUNCTION OF BOTH SIDES: ICD-10-CM

## 2019-10-08 DIAGNOSIS — Z79.899 ENCOUNTER FOR LONG-TERM (CURRENT) USE OF MEDICATIONS: ICD-10-CM

## 2019-10-08 PROCEDURE — 99214 OFFICE O/P EST MOD 30 MIN: CPT | Performed by: NURSE PRACTITIONER

## 2019-10-08 RX ORDER — HYDROCODONE BITARTRATE AND ACETAMINOPHEN 5; 325 MG/1; MG/1
1 TABLET ORAL EVERY 6 HOURS PRN
Qty: 120 TABLET | Refills: 0 | Status: SHIPPED | OUTPATIENT
Start: 2019-10-08 | End: 2019-11-01 | Stop reason: SDUPTHER

## 2019-10-08 NOTE — PROGRESS NOTES
"CHIEF COMPLAINT  F/U back pain- patient states that her pain has improved since her medication has been changed.     Subjective   Ana Maria Story is a 48 y.o. female  who presents to the office for follow-up.She has a history of back pain.    Complains of pain in her low back and extremities. Today her pain is 5/10VAS.  Currently taking Hydrocodone 5/325 4/day (improved since changing from Tramadol), Lyrica 200 mg TID and Flexeril 10 mg 2-3/day PRN. Denies any side effects form the regimen. The regimen helps decrease her pain by 50%. ADL's by self.     Still has custody of 1 year old grandson. Is extremely active with him.    Previous benefit with SI joint injections. Awaiting insurance to authorize repeat.      Peripheral Neuropathy   This is a chronic problem. The current episode started more than 1 year ago. The problem occurs constantly. The problem has been waxing and waning. Associated symptoms include arthralgias, fatigue, myalgias (fibromyalgia), numbness (bilat legs) and weakness (bilat legs). Pertinent negatives include no abdominal pain, chest pain, chills, congestion, coughing, diaphoresis, fever, headaches (\"slight for a couple days\"), joint swelling, nausea, neck pain or vomiting. Nothing (cold weather) aggravates the symptoms. She has tried NSAIDs and oral narcotics (Tramadol 50 mg 2 TID Lyrica 200 mg TID, Flexeril at HS, Elavil 150 mg HS) for the symptoms. The treatment provided moderate relief.   Back Pain   This is a chronic problem. The current episode started more than 1 year ago. The problem occurs constantly. The problem has been waxing and waning since onset. The pain is present in the lumbar spine, sacro-iliac and thoracic spine. The pain is at a severity of 5/10. The pain is moderate. Associated symptoms include numbness (bilat legs) and weakness (bilat legs). Pertinent negatives include no abdominal pain, bladder incontinence, bowel incontinence, chest pain, dysuria, fever or headaches " "(\"slight for a couple days\"). She has tried analgesics and muscle relaxant (SI injection--moderate to signficant relief) for the symptoms. The treatment provided moderate relief.      PEG Assessment   What number best describes your pain on average in the past week?5  What number best describes how, during the past week, pain has interfered with your enjoyment of life?5  What number best describes how, during the past week, pain has interfered with your general activity?  5      The following portions of the patient's history were reviewed and updated as appropriate: allergies, current medications, past family history, past medical history, past social history, past surgical history and problem list.    Review of Systems   Constitutional: Positive for fatigue. Negative for activity change (improved slightly), chills, diaphoresis and fever.   HENT: Negative for congestion.    Respiratory: Negative for cough, chest tightness and shortness of breath.    Cardiovascular: Negative for chest pain.   Gastrointestinal: Positive for constipation (r/t IBS) and diarrhea (r/t IBS). Negative for abdominal pain, bowel incontinence, nausea and vomiting.   Genitourinary: Negative for bladder incontinence, difficulty urinating and dysuria.   Musculoskeletal: Positive for arthralgias, back pain and myalgias (fibromyalgia). Negative for joint swelling and neck pain.   Neurological: Positive for weakness (bilat legs) and numbness (bilat legs). Negative for dizziness, light-headedness and headaches (\"slight for a couple days\").   Psychiatric/Behavioral: Negative for agitation, sleep disturbance and suicidal ideas. The patient is not nervous/anxious.        Vitals:    10/08/19 0854   BP: 148/87   Pulse: 105   Resp: 16   Temp: 97.6 °F (36.4 °C)   SpO2: 97%   Weight: 67.3 kg (148 lb 6.4 oz)   Height: 172.7 cm (68\")   PainSc:   5   PainLoc: Back       Objective   Physical Exam   Constitutional: She is oriented to person, place, and time. She " appears well-developed and well-nourished. No distress.   HENT:   Head: Normocephalic and atraumatic.   Eyes: Conjunctivae and EOM are normal.   Neck: Neck supple.   Cardiovascular: Normal rate.   Pulmonary/Chest: Effort normal. No respiratory distress.   Musculoskeletal:        Lumbar back: She exhibits tenderness, bony tenderness and pain.   + bilateral SI joint tenderness  +VENKAT bilaterally    Neurological: She is alert and oriented to person, place, and time. She has normal strength. Gait normal.   Skin: Skin is warm and dry. She is not diaphoretic.   Psychiatric: She has a normal mood and affect. Her behavior is normal.   Nursing note and vitals reviewed.    Assessment/Plan   Ana Maria was seen today for back pain.    Diagnoses and all orders for this visit:    Chronic pain syndrome    Sacroiliac joint dysfunction of both sides    Neuropathy    Encounter for long-term (current) use of medications      --- Check status of SI joint injection   --- Refill Hydrocodone. Patient appears stable with current regimen. No adverse effects. Regarding continuation of opioids, there is no evidence of aberrant behavior or any red flags.  The patient continues with appropriate response to opioid therapy. ADL's remain intact by self.   --- The urine drug screen confirmation from 5/7/19 has been reviewed and the result is appropriate based on patient history and POLLY report  --- Compounded pain cream   --- Follow-up 2 months          POLLY REPORT    As part of the patient's treatment plan, I am prescribing controlled substances. The patient has been made aware of appropriate use of such medications, including potential risk of somnolence, limited ability to drive and/or work safely, and the potential for dependence or overdose. It has also bee made clear that these medications are for use by this patient only, without concomitant use of alcohol or other substances unless prescribed.     Patient has completed prescribing  agreement detailing terms of continued prescribing of controlled substances, including monitoring POLLY reports, urine drug screening, and pill counts if necessary. The patient is aware that inappropriate use will results in cessation of prescribing such medications.    POLLY report has been reviewed and scanned into the patient's chart.    As the clinician, I personally reviewed the POLLY from 10/8/19 while the patient was in the office today.    History and physical exam exhibit continued safe and appropriate use of controlled substances.    EMR Dragon/Transcription disclaimer:   Much of this encounter note is an electronic transcription/translation of spoken language to printed text. The electronic translation of spoken language may permit erroneous, or at times, nonsensical words or phrases to be inadvertently transcribed; Although I have reviewed the note for such errors, some may still exist.

## 2019-11-01 RX ORDER — HYDROCODONE BITARTRATE AND ACETAMINOPHEN 5; 325 MG/1; MG/1
1 TABLET ORAL EVERY 6 HOURS PRN
Qty: 120 TABLET | Refills: 0 | Status: SHIPPED | OUTPATIENT
Start: 2019-11-01 | End: 2019-12-05 | Stop reason: SDUPTHER

## 2019-11-01 NOTE — TELEPHONE ENCOUNTER
Medication Refill Request    Date of phone call: 19    Medication being requested: Norco 5-325 mg   si tab po q 6 hrs prn  Qty: 120    Date of last visit: 10/8/19    Date of last refill: 10/8/19    POLLY up to date?: yes    Next Follow up?: 19    Any new pertinent information? (i.e, new medication allergies, new use of medications, change in patient's health or condition, non-compliance or inconsistency with prescribing agreement?):

## 2019-11-06 ENCOUNTER — OUTSIDE FACILITY SERVICE (OUTPATIENT)
Dept: PAIN MEDICINE | Facility: CLINIC | Age: 49
End: 2019-11-06

## 2019-11-06 ENCOUNTER — DOCUMENTATION (OUTPATIENT)
Dept: PAIN MEDICINE | Facility: CLINIC | Age: 49
End: 2019-11-06

## 2019-11-06 PROCEDURE — 27096 INJECT SACROILIAC JOINT: CPT | Performed by: ANESTHESIOLOGY

## 2019-11-06 NOTE — PROGRESS NOTES
Bilateral Sacroiliac Joint Injection  [facility]      PREOPERATIVE DIAGNOSIS:   Sacroiliac joint dysfunction, bilateral    POSTOPERATIVE DIAGNOSIS:  Sacroiliac joint dysfunction, bilateral    PROCEDURE:  Sacroiliac Joint Injection, Bilaterally, with fluoroscopic guidance    PRE-PROCEDURE DISCUSSION WITH PATIENT:    Risks and complications were discussed with the patient prior to starting the procedure and informed consent was obtained.  We discussed various topics including but not limited to bleeding, infection, injury, postprocedural site soreness, painful flareup, worsening of clinical picture, paralysis, coma, and death.     SURGEON:  Epifanio Plaza MD    REASON FOR PROCEDURE:    Positive sacroiliac provocation maneuvers noted.   Previous clinically significant therapeutic effect of SI joint injection.      SEDATION:  Patient declined administration of moderate sedation    ANESTHETIC AGENT:  Marcaine 0.5%  STEROID AGENT:  Methylprednisolone (DEPO MEDROL) 80mg/ml    DESCRIPTON OF PROCEDURE:  After obtaining informed consent, IV access was not obtained in the preoperative area.  The patient was transported to the operative suite and placed in the prone position with a pillow under the pelvic area. EKG, blood pressure, and pulse oximeter were monitored. The lumbosacral area was prepped with Chloraprep and draped in a sterile fashion. Under fluoroscopic guidance the inferior most portion of the right SI joint was identified. The overlying skin and subcutaneous tissue was anesthetized with 1% lidocaine. A 22-gauge spinal needle was introduced from the inferior most portion of the joint into the RIGHT SI joint under fluoroscopic guidance in the AP dimension with slight oblique rotation to the contralateral side.  Aspiration was negative.  After confirming the position of the needle with fluoroscopy, 1 mL of Omnipaque was injected and after seeing appropriate spread into the joint a total of 2mL  Marcaine, with the steroid, was injected very slowly.  Needle was removed intact.  A similar procedure was performed on the LEFT side.   Vital signs remained stable.  The onset of analgesia was noted.      ESTIMATED BLOOD LOSS:  minimal  SPECIMENS:  None  COMPLICATIONS:  No complications were noted., There was no indication of vascular uptake on live injection of contrast dye. and The patient did not have any signs of postprocedure numbness nor weakness.    TOLERANCE & DISCHARGE CONDITION:    The patient tolerated the procedure well.  The patient was transported to the recovery area without difficulties.  The patient was discharged to home under the care of family in stable and satisfactory condition.    PLAN OF CARE:  1. The patient was given our standard instruction sheet and will resume all medications as per the medication reconciliation sheet.  2. The patient will Return to clinic 4 wks.  3. The patient is instructed to keep a pain log hourly for 8 hours after the procedure.

## 2019-11-12 RX ORDER — PREGABALIN 200 MG/1
CAPSULE ORAL
Qty: 90 CAPSULE | Refills: 2 | Status: SHIPPED | OUTPATIENT
Start: 2019-11-12 | End: 2020-02-03 | Stop reason: SDUPTHER

## 2019-12-05 ENCOUNTER — OFFICE VISIT (OUTPATIENT)
Dept: PAIN MEDICINE | Facility: CLINIC | Age: 49
End: 2019-12-05

## 2019-12-05 VITALS
HEIGHT: 68 IN | DIASTOLIC BLOOD PRESSURE: 83 MMHG | BODY MASS INDEX: 21.58 KG/M2 | OXYGEN SATURATION: 100 % | TEMPERATURE: 97.5 F | WEIGHT: 142.4 LBS | RESPIRATION RATE: 18 BRPM | SYSTOLIC BLOOD PRESSURE: 152 MMHG | HEART RATE: 104 BPM

## 2019-12-05 DIAGNOSIS — G64 DISORDER OF PERIPHERAL NERVOUS SYSTEM: ICD-10-CM

## 2019-12-05 DIAGNOSIS — G89.4 CHRONIC PAIN SYNDROME: Primary | ICD-10-CM

## 2019-12-05 DIAGNOSIS — Z79.899 ENCOUNTER FOR LONG-TERM (CURRENT) USE OF MEDICATIONS: ICD-10-CM

## 2019-12-05 DIAGNOSIS — M79.671 RIGHT FOOT PAIN: ICD-10-CM

## 2019-12-05 DIAGNOSIS — M53.3 SACROILIAC JOINT DYSFUNCTION OF BOTH SIDES: ICD-10-CM

## 2019-12-05 DIAGNOSIS — M53.3 COCCYDYNIA: ICD-10-CM

## 2019-12-05 PROCEDURE — 99214 OFFICE O/P EST MOD 30 MIN: CPT | Performed by: NURSE PRACTITIONER

## 2019-12-05 RX ORDER — INFLUENZA VIRUS VACCINE 15; 15; 15; 15 UG/.5ML; UG/.5ML; UG/.5ML; UG/.5ML
SUSPENSION INTRAMUSCULAR
Refills: 0 | Status: ON HOLD | COMMUNITY
Start: 2019-10-08 | End: 2021-06-30

## 2019-12-05 RX ORDER — MELOXICAM 15 MG/1
15 TABLET ORAL DAILY
Qty: 30 TABLET | Refills: 0 | Status: ON HOLD | OUTPATIENT
Start: 2019-12-05 | End: 2021-06-30

## 2019-12-05 RX ORDER — CYCLOBENZAPRINE HCL 10 MG
10 TABLET ORAL 3 TIMES DAILY PRN
Qty: 90 TABLET | Refills: 3 | Status: SHIPPED | OUTPATIENT
Start: 2019-12-05 | End: 2020-07-08 | Stop reason: SDUPTHER

## 2019-12-05 RX ORDER — HYDROCODONE BITARTRATE AND ACETAMINOPHEN 5; 325 MG/1; MG/1
1 TABLET ORAL EVERY 6 HOURS PRN
Qty: 120 TABLET | Refills: 0 | Status: SHIPPED | OUTPATIENT
Start: 2019-12-05 | End: 2020-02-03 | Stop reason: SDUPTHER

## 2019-12-05 NOTE — PROGRESS NOTES
"CHIEF COMPLAINT  Follow-up for back pain. Ms. Story states that she fell on 10/31/19 and injured her tailbone. She has had increased right foot pain since her fall.    Subjective   Ana Maria Story is a 49 y.o. female  who presents to the office for follow-up.She has a history of back pain.    Complains of pain in her low back and extremities. Today her pain is 7/10VAS (says pain is worse).  Currently taking Hydrocodone 5/325 4/day (improved since changing from Tramadol), Lyrica 200 mg TID and Flexeril 10 mg 2-3/day PRN. Denies any side effects form the regimen. The regimen helps decrease her pain by 50%. ADL's by self.      Still has custody of 1 year old grandson. Is extremely active with him.     Previous benefit with SI joint injections. Bilateral SI joint injection 11/6/19 - helped \"a lot\", back is still much better.      Worsening right foot pain since falling just over a month ago. Tender on the right lateral foot.  Swelling present.  Has not tried ice/elevation/compressoin    Tailbone pain since fall. Hurts when sitting/standing.  Has tried sitting on special pillow which helps some.  Pain medication and muscle relaxant not helping much with the pain.      Peripheral Neuropathy   This is a chronic problem. The current episode started more than 1 year ago. The problem occurs constantly. The problem has been waxing and waning. Associated symptoms include arthralgias (right foot), congestion, coughing, myalgias (fibromyalgia), numbness and weakness. Pertinent negatives include no abdominal pain, chest pain, chills, diaphoresis, fatigue, fever, headaches (\"slight for a couple days\"), joint swelling, nausea, neck pain or vomiting. Nothing (cold weather) aggravates the symptoms. She has tried NSAIDs and oral narcotics (Tramadol 50 mg 2 TID Lyrica 200 mg TID, Flexeril at HS, Elavil 150 mg HS) for the symptoms. The treatment provided moderate relief.   Back Pain   This is a chronic problem. The current episode " "started more than 1 year ago. The problem occurs constantly. The problem has been waxing and waning since onset. The pain is present in the lumbar spine, sacro-iliac and thoracic spine. The pain is at a severity of 7/10. The pain is moderate. Associated symptoms include numbness and weakness. Pertinent negatives include no abdominal pain, bladder incontinence, bowel incontinence, chest pain, dysuria, fever or headaches (\"slight for a couple days\"). She has tried analgesics and muscle relaxant (SI injection--moderate to signficant relief) for the symptoms. The treatment provided moderate relief.      PEG Assessment   What number best describes your pain on average in the past week?5  What number best describes how, during the past week, pain has interfered with your enjoyment of life?5  What number best describes how, during the past week, pain has interfered with your general activity?  5    The following portions of the patient's history were reviewed and updated as appropriate: allergies, current medications, past family history, past medical history, past social history, past surgical history and problem list.    Review of Systems   Constitutional: Negative for chills, diaphoresis, fatigue and fever.   HENT: Positive for congestion.    Eyes: Negative for visual disturbance.   Respiratory: Positive for cough. Negative for shortness of breath and wheezing.    Cardiovascular: Negative.  Negative for chest pain.   Gastrointestinal: Negative for abdominal pain, bowel incontinence, constipation, diarrhea, nausea and vomiting.   Genitourinary: Negative for bladder incontinence, difficulty urinating and dysuria.   Musculoskeletal: Positive for arthralgias (right foot), back pain and myalgias (fibromyalgia). Negative for joint swelling and neck pain.   Neurological: Positive for weakness and numbness. Negative for headaches (\"slight for a couple days\").   Psychiatric/Behavioral: Positive for sleep disturbance. Negative " "for suicidal ideas. The patient is not nervous/anxious.      Vitals:    12/05/19 0925   BP: 152/83   Pulse: 104   Resp: 18   Temp: 97.5 °F (36.4 °C)   SpO2: 100%   Weight: 64.6 kg (142 lb 6.4 oz)   Height: 172.7 cm (68\")   PainSc:   7   PainLoc: Back     Objective   Physical Exam   Constitutional: She is oriented to person, place, and time. She appears well-developed and well-nourished. No distress.   HENT:   Head: Normocephalic and atraumatic.   Eyes: Conjunctivae and EOM are normal.   Neck: Neck supple.   Cardiovascular: Normal rate.   Pulmonary/Chest: Effort normal. No respiratory distress.   Musculoskeletal:        Lumbar back: She exhibits tenderness, bony tenderness and pain.        Right foot: There is tenderness, bony tenderness and swelling. There is normal range of motion and no deformity.   Tailbone tender    Neurological: She is alert and oriented to person, place, and time. She has normal strength. Gait normal.   Skin: Skin is warm and dry. She is not diaphoretic.   Psychiatric: She has a normal mood and affect. Her behavior is normal.   Nursing note and vitals reviewed.    Assessment/Plan   Ana Maria was seen today for back pain.    Diagnoses and all orders for this visit:    Chronic pain syndrome    Sacroiliac joint dysfunction of both sides    Disorder of peripheral nervous system    Encounter for long-term (current) use of medications    Right foot pain  -     XR Foot 3+ View Right; Future    Coccydynia  -     Case Request    Other orders  -     HYDROcodone-acetaminophen (NORCO) 5-325 MG per tablet; Take 1 tablet by mouth Every 6 (Six) Hours As Needed for Severe Pain . dnf 12/6/19  -     cyclobenzaprine (FLEXERIL) 10 MG tablet; Take 1 tablet by mouth 3 (Three) Times a Day As Needed for Muscle Spasms.  -     meloxicam (MOBIC) 15 MG tablet; Take 1 tablet by mouth Daily.      --- xray right foot   --- Caudal LAZARO. Reviewed the procedure at length with the patient.  Included in the review was expectations, " complications, risk and benefits.The procedure was described in detail and the risks, benefits and alternatives were discussed with the patient (including but not limited to: bleeding, infection, nerve damage, worsening of pain, inability to perform injection, paralysis, seizures, and death) who agreed to proceed.  Discussed the potential for sedation if warranted/wanted.  The procedure will plan to be performed at [facility] with fluoroscopic guidance(unless ultrasound is indicated). Questions were answered and in a way the patient could understand.  Patient verbalized understanding and wishes to proceed.  This intervention will be ordered.  Discussed with patient that all procedures are part of a multimodal plan of care and include either formal PT or a home exercise program.    --- Trial Meloxicam. Discussed medication with the patient.  Included in this discussion was the potential for side effects and adverse events.  Patient verbalized understanding and wished to proceed.  Prescription will be sent to pharmacy.  --- Refill Hydrocodone. Patient appears stable with current regimen. No adverse effects. Regarding continuation of opioids, there is no evidence of aberrant behavior or any red flags.  The patient continues with appropriate response to opioid therapy. ADL's remain intact by self.   --- The urine drug screen confirmation from 5/7/19 has been reviewed and the result is appropriate based on patient history and POLLY report.   --- Routine ODT in office today as part of monitoring requirements for controlled substances.  This specimen will be sent to [facility] laboratory for confirmation.     --- Follow-up 2 months          POLLY REPORT    As part of the patient's treatment plan, I am prescribing controlled substances. The patient has been made aware of appropriate use of such medications, including potential risk of somnolence, limited ability to drive and/or work safely, and the  potential for dependence or overdose. It has also bee made clear that these medications are for use by this patient only, without concomitant use of alcohol or other substances unless prescribed.     Patient has completed prescribing agreement detailing terms of continued prescribing of controlled substances, including monitoring POLLY reports, urine drug screening, and pill counts if necessary. The patient is aware that inappropriate use will results in cessation of prescribing such medications.    POLLY report has been reviewed and scanned into the patient's chart.    As the clinician, I personally reviewed the POLLY from 12/5/19 while the patient was in the office today.    History and physical exam exhibit continued safe and appropriate use of controlled substances.    EMR Dragon/Transcription disclaimer:   Much of this encounter note is an electronic transcription/translation of spoken language to printed text. The electronic translation of spoken language may permit erroneous, or at times, nonsensical words or phrases to be inadvertently transcribed; Although I have reviewed the note for such errors, some may still exist.

## 2019-12-16 ENCOUNTER — PRIOR AUTHORIZATION (OUTPATIENT)
Dept: PAIN MEDICINE | Facility: CLINIC | Age: 49
End: 2019-12-16

## 2019-12-20 ENCOUNTER — TELEPHONE (OUTPATIENT)
Dept: PAIN MEDICINE | Facility: CLINIC | Age: 49
End: 2019-12-20

## 2020-01-23 ENCOUNTER — PRIOR AUTHORIZATION (OUTPATIENT)
Dept: PAIN MEDICINE | Facility: CLINIC | Age: 50
End: 2020-01-23

## 2020-01-23 NOTE — TELEPHONE ENCOUNTER
Ana Maria Story (Baptiste: [id number]) - [id number]    Pregabalin 200MG capsules    Status: Approved    Sent: January 23rd, 2020

## 2020-02-03 ENCOUNTER — OFFICE VISIT (OUTPATIENT)
Dept: PAIN MEDICINE | Facility: CLINIC | Age: 50
End: 2020-02-03

## 2020-02-03 VITALS
TEMPERATURE: 97.1 F | RESPIRATION RATE: 20 BRPM | SYSTOLIC BLOOD PRESSURE: 124 MMHG | HEIGHT: 68 IN | BODY MASS INDEX: 22.79 KG/M2 | OXYGEN SATURATION: 98 % | HEART RATE: 109 BPM | DIASTOLIC BLOOD PRESSURE: 71 MMHG | WEIGHT: 150.4 LBS

## 2020-02-03 DIAGNOSIS — Z79.899 ENCOUNTER FOR LONG-TERM (CURRENT) USE OF MEDICATIONS: ICD-10-CM

## 2020-02-03 DIAGNOSIS — G89.4 CHRONIC PAIN SYNDROME: Primary | ICD-10-CM

## 2020-02-03 DIAGNOSIS — G64 DISORDER OF PERIPHERAL NERVOUS SYSTEM: ICD-10-CM

## 2020-02-03 DIAGNOSIS — M53.3 SACROILIAC JOINT DYSFUNCTION OF BOTH SIDES: ICD-10-CM

## 2020-02-03 LAB
POC AMPHETAMINES: NEGATIVE
POC BARBITURATES: NEGATIVE
POC BENZODIAZEPHINES: NEGATIVE
POC COCAINE: NEGATIVE
POC METHADONE: NEGATIVE
POC METHAMPHETAMINE SCREEN URINE: NEGATIVE
POC OPIATES: NEGATIVE
POC OXYCODONE: NEGATIVE
POC PHENCYCLIDINE: NEGATIVE
POC PROPOXYPHENE: NEGATIVE
POC THC: NEGATIVE
POC TRICYCLIC ANTIDEPRESSANTS: NEGATIVE

## 2020-02-03 PROCEDURE — 99214 OFFICE O/P EST MOD 30 MIN: CPT | Performed by: NURSE PRACTITIONER

## 2020-02-03 PROCEDURE — 80305 DRUG TEST PRSMV DIR OPT OBS: CPT | Performed by: NURSE PRACTITIONER

## 2020-02-03 RX ORDER — PREGABALIN 200 MG/1
200 CAPSULE ORAL 3 TIMES DAILY
Qty: 90 CAPSULE | Refills: 2 | Status: SHIPPED | OUTPATIENT
Start: 2020-02-03 | End: 2020-04-29 | Stop reason: SDUPTHER

## 2020-02-03 RX ORDER — HYDROCODONE BITARTRATE AND ACETAMINOPHEN 5; 325 MG/1; MG/1
1 TABLET ORAL EVERY 6 HOURS PRN
Qty: 120 TABLET | Refills: 0 | Status: SHIPPED | OUTPATIENT
Start: 2020-02-03 | End: 2020-02-25 | Stop reason: SDUPTHER

## 2020-02-03 NOTE — PROGRESS NOTES
"CHIEF COMPLAINT  F/u back pain. Pt sts pain has increased all over since not having lyrica in a month, pt would like to discuss getting it prescribed again.     Subjective   Ana Maria Story is a 49 y.o. female  who presents to the office for follow-up.She has a history of back pain.    Complains of pain in her low back and extremities. Today her pain is 7/10VAS (says pain is worse).  Currently taking Hydrocodone 5/325 4/day, Lyrica 200 mg TID and Flexeril 10 mg 2-3/day PRN. Denies any side effects form the regimen. The regimen helps decrease her pain by 50%. ADL's by self.     Has been out of medication since she failed to show up for a random uds and pill count.  Last drug screen lacked metabolites although it was an oral test.  She states she was pulled over on the way here and her license was , was told \"go straight home\", she did not call us.       Still has custody of 1 year old grandson. Is extremely active with him.     Previous benefit with SI joint injections. Bilateral SI joint injection 19 - helped \"a lot\", back is still much better.      Worsening back and tailbone pain since fall several months ago. Hurts when sitting/standing.  Has tried sitting on special pillow which helps some.  Pain medication and muscle relaxant not helping much with the pain.  Ordered caudal adam last visit which she has not completed yet.      Peripheral Neuropathy   This is a chronic problem. The current episode started more than 1 year ago. The problem occurs constantly. The problem has been waxing and waning. Associated symptoms include arthralgias (right foot), congestion, coughing, myalgias (fibromyalgia), numbness and weakness. Pertinent negatives include no abdominal pain, chest pain, chills, diaphoresis, fatigue, fever, headaches (\"slight for a couple days\"), joint swelling, nausea, neck pain or vomiting. Nothing (cold weather) aggravates the symptoms. She has tried NSAIDs and oral narcotics (Tramadol 50 mg 2 " "TID Lyrica 200 mg TID, Flexeril at HS, Elavil 150 mg HS) for the symptoms. The treatment provided moderate relief.   Back Pain   This is a chronic problem. The current episode started more than 1 year ago. The problem occurs constantly. The problem has been waxing and waning since onset. The pain is present in the lumbar spine, sacro-iliac and thoracic spine. The pain is at a severity of 7/10. The pain is moderate. Associated symptoms include numbness and weakness. Pertinent negatives include no abdominal pain, bladder incontinence, bowel incontinence, chest pain, dysuria, fever or headaches (\"slight for a couple days\"). She has tried analgesics and muscle relaxant (SI injection--moderate to signficant relief) for the symptoms. The treatment provided moderate relief.      PEG Assessment   What number best describes your pain on average in the past week?8  What number best describes how, during the past week, pain has interfered with your enjoyment of life?8  What number best describes how, during the past week, pain has interfered with your general activity?  8    The following portions of the patient's history were reviewed and updated as appropriate: allergies, current medications, past family history, past medical history, past social history, past surgical history and problem list.    Review of Systems   Constitutional: Positive for activity change (dec). Negative for chills, diaphoresis, fatigue and fever.   HENT: Positive for congestion.    Eyes: Negative for visual disturbance.   Respiratory: Positive for cough. Negative for shortness of breath and wheezing.    Cardiovascular: Negative.  Negative for chest pain.   Gastrointestinal: Negative for abdominal pain, bowel incontinence, constipation, diarrhea, nausea and vomiting.   Genitourinary: Negative for bladder incontinence, difficulty urinating and dysuria.   Musculoskeletal: Positive for arthralgias (right foot), back pain and myalgias (fibromyalgia). " "Negative for joint swelling and neck pain.   Neurological: Positive for weakness and numbness. Negative for headaches (\"slight for a couple days\").   Psychiatric/Behavioral: Positive for sleep disturbance. Negative for suicidal ideas. The patient is not nervous/anxious.      Vitals:    02/03/20 0938   BP: 124/71   Pulse: 109   Resp: 20   Temp: 97.1 °F (36.2 °C)   SpO2: 98%   Weight: 68.2 kg (150 lb 6.4 oz)   Height: 172.7 cm (68\")   PainSc:   7   PainLoc: Back     Objective   Physical Exam   Constitutional: She is oriented to person, place, and time. She appears well-developed and well-nourished. No distress.   HENT:   Head: Normocephalic and atraumatic.   Eyes: Conjunctivae and EOM are normal.   Neck: Neck supple.   Cardiovascular: Normal rate.   Pulmonary/Chest: Effort normal. No respiratory distress.   Musculoskeletal:        Lumbar back: She exhibits tenderness, bony tenderness and pain.        Right foot: There is tenderness, bony tenderness and swelling. There is normal range of motion and no deformity.   Neurological: She is alert and oriented to person, place, and time. She has normal strength. Gait normal.   Skin: Skin is warm and dry. She is not diaphoretic.   Psychiatric: She has a normal mood and affect. Her behavior is normal.   Nursing note and vitals reviewed.    Assessment/Plan   Ana Maria was seen today for back pain.    Diagnoses and all orders for this visit:    Chronic pain syndrome    Sacroiliac joint dysfunction of both sides    Disorder of peripheral nervous system    Encounter for long-term (current) use of medications    Other orders  -     HYDROcodone-acetaminophen (NORCO) 5-325 MG per tablet; Take 1 tablet by mouth Every 6 (Six) Hours As Needed for Severe Pain .  -     pregabalin (LYRICA) 200 MG capsule; Take 1 capsule by mouth 3 (Three) Times a Day.      --- Proceed with Caudal LAZARO   --- UPDATE PRESCRIBING AGREEMENT - NO FURTHER ABERRANCIES WILL BE TOLERATED. The patient has signed a copy " of our prescribing agreement.  The has stated both verbal and written understanding that prescription pain medication may be stopped if - pain does not significantly decrease and/or function increase, there is evidence of diverting medication, if She obtained controlled substances from another licensed practitioner without my knowledge and approval, if I feel that it is in the patient's best interest, if She exhibits any aggressive behavior to any provider or staff member in this office.  The patient agrees to only use the medication exactly as prescribed, to fill controlled substances at the same pharmacy, to keep medication in the original container, and to store controlled substances in a locked cabinet or other secure storage unit. The patient agrees to notify the office immediately if medication is lost or stolen and will be asked to produce an official police report prior to replacing/continuing lost/stolen controlled substances.  The patient understands that there will be routine monitoring including urine drug screens, pill counts, and review of pharmacy report.  The patient understands that She may be asked to submit to random drug screen or pill count. The patient will not seek early refills.  The patient will not use illegal street drugs while receiving controlled substances from this practice.  The patient understands that failure to adhere with this agreement may result in cessation of therapy with controlled substance prescribing.     --- Refill hydrocodone. Patient appears stable with current regimen. No adverse effects. Regarding continuation of opioids, there is no evidence of aberrant behavior or any red flags.  The patient continues with appropriate response to opioid therapy. ADL's remain intact by self.   --- Routine UDS in office today as part of monitoring requirements for controlled substances.  The specimen was viewed and the immunoassay result reviewed and is NEG.  This specimen will be  sent to [facility] laboratory for confirmation.     --- Follow-up 2 months        POLLY REPORT  As part of the patient's treatment plan, I am prescribing controlled substances. The patient has been made aware of appropriate use of such medications, including potential risk of somnolence, limited ability to drive and/or work safely, and the potential for dependence or overdose. It has also bee made clear that these medications are for use by this patient only, without concomitant use of alcohol or other substances unless prescribed.     Patient has completed prescribing agreement detailing terms of continued prescribing of controlled substances, including monitoring POLLY reports, urine drug screening, and pill counts if necessary. The patient is aware that inappropriate use will results in cessation of prescribing such medications.    POLLY report has been reviewed and scanned into the patient's chart.    As the clinician, I personally reviewed the POLLY from 2/3/2020 while the patient was in the office today.    History and physical exam exhibit continued safe and appropriate use of controlled substances.    EMR Dragon/Transcription disclaimer:   Much of this encounter note is an electronic transcription/translation of spoken language to printed text. The electronic translation of spoken language may permit erroneous, or at times, nonsensical words or phrases to be inadvertently transcribed; Although I have reviewed the note for such errors, some may still exist.

## 2020-02-08 ENCOUNTER — RESULTS ENCOUNTER (OUTPATIENT)
Dept: PAIN MEDICINE | Facility: CLINIC | Age: 50
End: 2020-02-08

## 2020-02-08 DIAGNOSIS — G64 DISORDER OF PERIPHERAL NERVOUS SYSTEM: ICD-10-CM

## 2020-02-08 DIAGNOSIS — Z79.899 ENCOUNTER FOR LONG-TERM (CURRENT) USE OF MEDICATIONS: ICD-10-CM

## 2020-02-08 DIAGNOSIS — G89.4 CHRONIC PAIN SYNDROME: ICD-10-CM

## 2020-02-08 DIAGNOSIS — M53.3 SACROILIAC JOINT DYSFUNCTION OF BOTH SIDES: ICD-10-CM

## 2020-02-25 RX ORDER — HYDROCODONE BITARTRATE AND ACETAMINOPHEN 5; 325 MG/1; MG/1
1 TABLET ORAL EVERY 6 HOURS PRN
Qty: 120 TABLET | Refills: 0 | Status: SHIPPED | OUTPATIENT
Start: 2020-02-25 | End: 2020-04-02 | Stop reason: SDUPTHER

## 2020-02-25 NOTE — TELEPHONE ENCOUNTER
Medication Refill Request    Date of phone call: 20    Medication being requested: norco 5/325mg si tab po q 6 hours prn   Qty: 120    Date of last visit: 2/3/20    Date of last refill: 2/3/20    POLLY up to date?: yes    Next Follow up?: 4/3/20    Any new pertinent information? (i.e, new medication allergies, new use of medications, change in patient's health or condition, non-compliance or inconsistency with prescribing agreement?):

## 2020-03-27 RX ORDER — HYDROCODONE BITARTRATE AND ACETAMINOPHEN 5; 325 MG/1; MG/1
1 TABLET ORAL EVERY 6 HOURS PRN
Qty: 120 TABLET | Refills: 0 | Status: CANCELLED | OUTPATIENT
Start: 2020-03-27

## 2020-04-02 ENCOUNTER — TELEMEDICINE (OUTPATIENT)
Dept: PAIN MEDICINE | Facility: CLINIC | Age: 50
End: 2020-04-02

## 2020-04-02 DIAGNOSIS — M53.3 SACROILIAC JOINT DYSFUNCTION OF BOTH SIDES: ICD-10-CM

## 2020-04-02 DIAGNOSIS — M53.3 COCCYDYNIA: ICD-10-CM

## 2020-04-02 DIAGNOSIS — Z79.899 ENCOUNTER FOR LONG-TERM (CURRENT) USE OF MEDICATIONS: ICD-10-CM

## 2020-04-02 DIAGNOSIS — G89.4 CHRONIC PAIN SYNDROME: Primary | ICD-10-CM

## 2020-04-02 DIAGNOSIS — G62.9 PERIPHERAL POLYNEUROPATHY: ICD-10-CM

## 2020-04-02 PROCEDURE — 99442 PR PHYS/QHP TELEPHONE EVALUATION 11-20 MIN: CPT | Performed by: ANESTHESIOLOGY

## 2020-04-02 RX ORDER — HYDROCODONE BITARTRATE AND ACETAMINOPHEN 5; 325 MG/1; MG/1
1 TABLET ORAL EVERY 6 HOURS PRN
Qty: 120 TABLET | Refills: 0 | Status: SHIPPED | OUTPATIENT
Start: 2020-04-02 | End: 2020-04-29 | Stop reason: SDUPTHER

## 2020-04-02 NOTE — PROGRESS NOTES
TELEPHONE CHECK-IN  (plan was for Video Visit but she had connectivity issues)      CHIEF COMPLAINT      Subjective   Ana Maria Story is a 49 y.o. female  who presents for a telephone follow-up.She has a history of back pain as well as peripheral neuropathy and also sacroiliac dysfunction.    I reviewed urine drug screens over the past year and compared those to the [facility] reporting over the past year.  Drug screen confirmation from May 7, 2019 as below was positive for tramadol which was appropriate but was unexpectedly negative for Lyrica.  It does not appear that Lyrica was checked for in December 2019.  Drug screen from February 3, 2020 was negative for hydrocodone but it had been almost 60 days since the previous refill of the 30-day supply, however it was negative for Lyrica as well.    On chart review she had previously noted that there was a situation where she been called in for a random drug screen but while she was coming here she said she was speeding and chasing a pulled over and got a ticket and got cited for [employer] drivers license.  She states that her pain overall especially radiating pain had increased in the past when her supply Lyrica ran out.  Lyrica authorizations have been difficult and time-consuming with the insurance company has evidence with multiple correspondences with [employer] in the media tab.    On review of the chart including the February 3 office visit with my partner Brandi RIVERA she was counseled that drug screen aberrancies could not be tolerated.    So I did ask her today about her Lyrica use...  She still takes it most days, and 2-3x/day.   When she stops it she notices that her feet/legs severely flare up.      Last procedure was bilateral sacroiliac injections in November 2019 and at the next office visit she said that those had significantly helped her lumbosacral area and relief was sustained.  However she had also previously noted some flareup of  "tailbone pain after a fall and caudal epidural have been ordered in December.  No auth required.  So I did ask her today about scheduling that... She would like to consider bilateral SIJ injections now as soon as possible.     Peripheral Neuropathy   This is a chronic problem. The current episode started more than 1 year ago. The problem occurs constantly. The problem has been unchanged. Associated symptoms include arthralgias (right foot), congestion, coughing, myalgias (fibromyalgia), numbness and weakness. Pertinent negatives include no abdominal pain, chest pain, chills, diaphoresis, fatigue, fever, headaches (\"slight for a couple days\"), joint swelling, nausea, neck pain or vomiting. Nothing (cold weather) aggravates the symptoms. She has tried NSAIDs and oral narcotics (Tramadol 50 mg 2 TID Lyrica 200 mg TID, Flexeril at HS, Elavil 150 mg HS) for the symptoms. The treatment provided moderate relief.   Back Pain   This is a chronic problem. The current episode started more than 1 year ago. The problem occurs constantly. The problem is unchanged. The pain is present in the lumbar spine, sacro-iliac and thoracic spine. The pain is moderate. Associated symptoms include numbness and weakness. Pertinent negatives include no abdominal pain, bladder incontinence, bowel incontinence, chest pain, dysuria, fever or headaches (\"slight for a couple days\"). She has tried analgesics and muscle relaxant (SI injection--moderate to signficant relief) for the symptoms. The treatment provided moderate relief.        PEG Assessment   What number best describes your pain on average in the past week?8  What number best describes how, during the past week, pain has interfered with your enjoyment of life?8  What number best describes how, during the past week, pain has interfered with your general activity?  8    --  The aforementioned information the Chief Complaint section and above subjective data including any HPI data has been " personally reviewed and affirmed.  --        The following portions of the patient's history were reviewed and updated as appropriate: allergies, current medications, past family history, past medical history, past social history, past surgical history and problem list.    -------    The following portions of the patient's history were reviewed and updated as appropriate: allergies, current medications, past family history, past medical history, past social history, past surgical history and problem list.    Allergies   Allergen Reactions   • Other      adhesive         Current Outpatient Medications:   •  AMITIZA 8 MCG capsule, , Disp: , Rfl: 0  •  amitriptyline (ELAVIL) 150 MG tablet, Take 1 tablet by mouth Every Night., Disp: 30 tablet, Rfl: 5  •  carbidopa-levodopa (SINEMET)  MG per tablet, Take 1 tablet by mouth 3 (three) times a day.  , Disp: , Rfl:   •  cyclobenzaprine (FLEXERIL) 10 MG tablet, Take 1 tablet by mouth 3 (Three) Times a Day As Needed for Muscle Spasms., Disp: 90 tablet, Rfl: 3  •  dicyclomine (BENTYL) 10 MG capsule, Take 1 capsule by mouth 4 (four) times a day as needed., Disp: , Rfl:   •  FLUARIX QUADRIVALENT 0.5 ML suspension prefilled syringe injection, inject 0.5 milliliters intramuscularly, Disp: , Rfl: 0  •  HYDROcodone-acetaminophen (NORCO) 5-325 MG per tablet, Take 1 tablet by mouth Every 6 (Six) Hours As Needed for Severe Pain . DNF 3/3/2020, Disp: 120 tablet, Rfl: 0  •  meclizine (ANTIVERT) 12.5 MG tablet, take 1 tablet by mouth three times a day if needed for VERTIGO, Disp: , Rfl: 0  •  meloxicam (MOBIC) 15 MG tablet, Take 1 tablet by mouth Daily., Disp: 30 tablet, Rfl: 0  •  omeprazole (priLOSEC) 20 MG capsule, Take 1 capsule by mouth Daily., Disp: 30 capsule, Rfl: 1  •  polyethylene glycol (MIRALAX) powder, Take by mouth daily. Mix 1 capful in 8 ounces of water and drink daily, Disp: , Rfl:   •  pregabalin (LYRICA) 200 MG capsule, Take 1 capsule by mouth 3 (Three) Times a  Day., Disp: 90 capsule, Rfl: 2    Current Outpatient Medications on File Prior to Visit   Medication Sig Dispense Refill   • AMITIZA 8 MCG capsule   0   • amitriptyline (ELAVIL) 150 MG tablet Take 1 tablet by mouth Every Night. 30 tablet 5   • carbidopa-levodopa (SINEMET)  MG per tablet Take 1 tablet by mouth 3 (three) times a day.       • cyclobenzaprine (FLEXERIL) 10 MG tablet Take 1 tablet by mouth 3 (Three) Times a Day As Needed for Muscle Spasms. 90 tablet 3   • dicyclomine (BENTYL) 10 MG capsule Take 1 capsule by mouth 4 (four) times a day as needed.     • FLUARIX QUADRIVALENT 0.5 ML suspension prefilled syringe injection inject 0.5 milliliters intramuscularly  0   • HYDROcodone-acetaminophen (NORCO) 5-325 MG per tablet Take 1 tablet by mouth Every 6 (Six) Hours As Needed for Severe Pain . DNF 3/3/2020 120 tablet 0   • meclizine (ANTIVERT) 12.5 MG tablet take 1 tablet by mouth three times a day if needed for VERTIGO  0   • meloxicam (MOBIC) 15 MG tablet Take 1 tablet by mouth Daily. 30 tablet 0   • omeprazole (priLOSEC) 20 MG capsule Take 1 capsule by mouth Daily. 30 capsule 1   • polyethylene glycol (MIRALAX) powder Take by mouth daily. Mix 1 capful in 8 ounces of water and drink daily     • pregabalin (LYRICA) 200 MG capsule Take 1 capsule by mouth 3 (Three) Times a Day. 90 capsule 2     No current facility-administered medications on file prior to visit.        Patient Active Problem List   Diagnosis   • Disorder of peripheral nervous system   • B12 neuropathy (CMS/HCC)   • Chronic pain syndrome   • Encounter for long-term (current) use of medications   • Sacroiliac joint dysfunction of both sides   • Pain in extremity   • SI (sacroiliac) joint dysfunction       Past Medical History:   Diagnosis Date   • Extremity pain    • IBS (irritable bowel syndrome)    • Joint pain    • Leg pain    • Low back pain    • Osteoarthritis    • Ovarian cyst    • Peripheral neuropathy    • Vitamin B12 deficiency    •  "Vitamin D deficiency        Past Surgical History:   Procedure Laterality Date   • CARPAL TUNNEL RELEASE Bilateral    • CHOLECYSTECTOMY     • COLONOSCOPY     • HYSTERECTOMY     • OOPHORECTOMY Bilateral    • ULNAR NERVE TRANSPOSITION Bilateral        Family History   Problem Relation Age of Onset   • Arthritis Mother    • Diabetes Mother    • Hypertension Mother    • Other Mother         uterine cyst   • Ovarian cysts Sister    • Diabetes Brother    • Hypertension Daughter    • Arthritis Maternal Grandmother    • Diabetes Maternal Grandmother    • Hypertension Maternal Grandmother    • Other Maternal Grandmother         uterine cyst   • Arthritis Maternal Grandfather        Social History     Socioeconomic History   • Marital status:      Spouse name: Not on file   • Number of children: Not on file   • Years of education: Not on file   • Highest education level: Not on file   Tobacco Use   • Smoking status: Light Tobacco Smoker     Packs/day: 0.25     Years: 25.00     Pack years: 6.25   • Smokeless tobacco: Never Used   Substance and Sexual Activity   • Alcohol use: No   • Drug use: No       -------        Review of Systems   Constitutional: Negative for chills, diaphoresis, fatigue and fever.   HENT: Positive for congestion. Negative for ear pain.    Eyes: Negative for pain.   Respiratory: Positive for cough. Negative for chest tightness and shortness of breath.    Cardiovascular: Negative for chest pain and palpitations.   Gastrointestinal: Negative for abdominal pain, bowel incontinence, nausea and vomiting.   Genitourinary: Negative for bladder incontinence and dysuria.   Musculoskeletal: Positive for arthralgias (right foot), back pain and myalgias (fibromyalgia). Negative for joint swelling and neck pain.   Neurological: Positive for weakness and numbness. Negative for headaches (\"slight for a couple days\").         ---                ---            ----      There were no vitals filed for this " visit.      Objective   Physical Exam  Deferred as this was converted to telephone.      Assessment/Plan   Diagnoses and all orders for this visit:    Chronic pain syndrome    Encounter for long-term (current) use of medications    Sacroiliac joint dysfunction of both sides    Peripheral polyneuropathy    Coccydynia          ----------------    Our practice is offering alternative &/or electronic methods to continue to follow our patients while at the same time further the efforts toward social distancing, in accordance with our organizational policies, professional societies' guidance, and gubernatorial mandates.  I support the Healthy at Home campaign and in this visit I have counseled the patient on our needs to limit in-person office visits and physical encounters with medical facilities whenever possible.  I have also educated the patient on the medical necessities of maintaining social distancing while we continue to function during this crisis period.        The patient was counseled on the need to consider telehealth options. The patient was offered the opportunity for a Video Visit using [employer]. The patient agreed to a Video Visit. The patient had obstacles which preclude consideration for a Video Visit. As a Video Visit was not practical, the patient was offered the option for a Telephone Check-In. The patient agreed to a Telephone Check-In. The patient was educated about our efforts to comply with monitoring standards when prescribing potent medications.    ----------------      --- Follow-up in a month  -- she would like to have injection when available  -- continue hydrocodone, lyrica, muslce relaxant    Patient appears stable with current regimen. No adverse effects. Regarding continuation of opioids, there is no evidence of aberrant behavior or any red flags.  The patient continues with appropriate response to opioid therapy. ADL's remain intact by self.          This was a 14 minute telephone visit.   Discussion as above.   Education about SIJ and also medication agreements and compliance were 8 of the 14 mintues.    ----------  Education about Sacroiliac joint injections:  This Sacroiliac joint injection (blockade) we have suggested is intended for diagnostic purposes, with the intent of offering the patient Radiofrequency thermal rhizotomy (RF) of the sensory branches to the joint if the block is diagnostically effective.  The diagnostic blockade is necessary to determine the likelihood that RF therapy could be efficacious in providing long term relief to the patient.    In this procedure, the sacroiliac joint is aligned with imaging, and under image guidance a needle is placed with the needle tip into the joint.  The needle position is confirmed to be appropriately in the joint before injection of medication into the joint.  When xray fluoroscopy is used, contrast dye is used to confirm a proper arthrogram (i.e., outline of the joint).  When ultrasound is used, IV fluid (normal saline) is injected to see the flow of the fluid into the joint.  Once confirmed, then the medication can be injected into the joint.  Oftentimes this medication is a combination of local anesthetics (for diagnostic purposes) and also a steroid (to decrease irritation & inflammation in the joint, also known as sacroilitis).      Medically, a successful RF procedure should provide a patient with 50% pain relief or more for at least 6 months.  Clinical experience suggests that successful patients receive relief more in the range of 12 months on average.  We also discussed that many patients receive therapeutic success from the intraarticular joint injection, and may not require RF ablation.  If a patient receives more than 8 weeks of relief from joint injection, then occasional repeat joint blocks for therapeutic purposes is a very reasonable alternative therapy.  This course of therapy is consistent with our LCDs according to our CMS   in the area, and therefore other insurance providers should follow accordingly.  We will monitor our patients to screen for these therapeutic responders and will offer RF therapy only when necessary.      We discussed that joint injections & also RF procedures are not without risks.  Best practices regarding anticoagulant use & neuraxial procedures will be respected.  Oftentimes a patient on an anticoagulant can be offered a joint injection safely, but again this is not risk-free, and such patients give consent with regards to this increased bleeding risk, which could cause problems including but not limited to worsening of pain, nerve damage, or muscle damage.  Patients that are ill or otherwise may be at risk for sepsis will not have their spines accessed by neuraxial injections of any type.  This patient will not be offered these therapies if there is an increased risk.   We discussed that there is a risk of postprocedural pain and also a risk of worsening of clinical picture with these procedures as with any neuraxial procedure.    ----------      POLLY REPORT    As part of the patient's treatment plan, I am prescribing controlled substances. The patient has been made aware of appropriate use of such medications, including potential risk of somnolence, limited ability to drive and/or work safely, and the potential for dependence or overdose. It has also bee made clear that these medications are for use by this patient only, without concomitant use of alcohol or other substances unless prescribed.     Patient has completed prescribing agreement detailing terms of continued prescribing of controlled substances, including monitoring POLLY reports, urine drug screening, and pill counts if necessary. The patient is aware that inappropriate use will results in cessation of prescribing such medications.    POLLY report has been reviewed and scanned into the patient's chart.    As the clinician, I  personally reviewed the POLLY from as above while the patient was on the telemedicine visit today.    History and physical exam exhibit continued safe and appropriate use of controlled substances.    -------    EMR Dragon/Transcription disclaimer:   Much of this encounter note is an electronic transcription/translation of spoken language to printed text. The electronic translation of spoken language may permit erroneous, or at times, nonsensical words or phrases to be inadvertently transcribed; Although I have reviewed the note for such errors, some may still exist.

## 2020-04-29 ENCOUNTER — OFFICE VISIT (OUTPATIENT)
Dept: PAIN MEDICINE | Facility: CLINIC | Age: 50
End: 2020-04-29

## 2020-04-29 DIAGNOSIS — G64 DISORDER OF PERIPHERAL NERVOUS SYSTEM: ICD-10-CM

## 2020-04-29 DIAGNOSIS — M53.3 SI (SACROILIAC) JOINT DYSFUNCTION: ICD-10-CM

## 2020-04-29 DIAGNOSIS — Z79.899 ENCOUNTER FOR LONG-TERM (CURRENT) USE OF MEDICATIONS: Primary | ICD-10-CM

## 2020-04-29 DIAGNOSIS — G89.4 CHRONIC PAIN SYNDROME: ICD-10-CM

## 2020-04-29 PROCEDURE — 99441 PR PHYS/QHP TELEPHONE EVALUATION 5-10 MIN: CPT | Performed by: NURSE PRACTITIONER

## 2020-04-29 RX ORDER — PREGABALIN 200 MG/1
200 CAPSULE ORAL 3 TIMES DAILY
Qty: 90 CAPSULE | Refills: 0 | Status: SHIPPED | OUTPATIENT
Start: 2020-04-29 | End: 2020-05-28 | Stop reason: SDUPTHER

## 2020-04-29 RX ORDER — HYDROCODONE BITARTRATE AND ACETAMINOPHEN 5; 325 MG/1; MG/1
1 TABLET ORAL EVERY 6 HOURS PRN
Qty: 120 TABLET | Refills: 0 | Status: SHIPPED | OUTPATIENT
Start: 2020-04-29 | End: 2020-05-28 | Stop reason: SDUPTHER

## 2020-04-29 NOTE — PROGRESS NOTES
"TELEPHONE VISIT    You have chosen to receive care through a telephone visit. Do you consent to use a telephone visit for your medical care today? Yes    CHIEF COMPLAINT  F/U back pain- patient states that her pain is better in the morning but by the evening it is about the same as it always is. She states that she has been sleeping in her chair because the angle of the chair relives the pressure on her back.    Initial evaluation by MIGUEL Shrestha   Ana Maria Story is a 49 y.o. female  who presents for a telephonic follow-up.She has a history of low back pain and pain in her extremities.  Today her pain is 4/10VAS in severity. Currently taking Hydrocodone 5/325 3-4/day, Lyrica 200 mg 3/day and Flexeril 10 mg 2-3/day PRN. Her medication regimen decreases her pain by a moderate amount.  ADLs by self.  She denies any side effects from her medication regimen including constipation or somnolence.  She states she has IBS and fluctuates between loose stools and constipation, but has not noticed any worsening constipation with her medication. She denies any changes to her bowel or bladder since her last office visit.     Peripheral Neuropathy   This is a chronic problem. The current episode started more than 1 year ago. The problem occurs constantly. The problem has been unchanged. Associated symptoms include abdominal pain, arthralgias (right foot), congestion, coughing (r/t allergies), myalgias (fibromyalgia), numbness (legs, feet, arms, hands, some areas of the back) and weakness. Pertinent negatives include no chest pain, chills, diaphoresis, fatigue, fever, headaches (\"slight for a couple days\"), joint swelling, nausea, neck pain or vomiting. Nothing (cold weather) aggravates the symptoms. She has tried NSAIDs and oral narcotics (Norco 5/325, Lyrica 200 mg TID, Flexeril at HS, Elavil 150 mg HS) for the symptoms. The treatment provided moderate relief.   Back Pain   This is a chronic problem. The " "current episode started more than 1 year ago. The problem occurs constantly. The problem is unchanged. The pain is present in the lumbar spine, sacro-iliac and thoracic spine. The pain is moderate. Associated symptoms include abdominal pain, numbness (legs, feet, arms, hands, some areas of the back) and weakness. Pertinent negatives include no bladder incontinence, bowel incontinence, chest pain, dysuria, fever or headaches (\"slight for a couple days\"). She has tried analgesics and muscle relaxant (SI injection, norco, flexeril) for the symptoms. The treatment provided moderate relief.      PEG Assessment   What number best describes your pain on average in the past week?6  What number best describes how, during the past week, pain has interfered with your enjoyment of life?5  What number best describes how, during the past week, pain has interfered with your general activity?  5    The following portions of the patient's history were reviewed and updated as appropriate: allergies, current medications, past family history, past medical history, past social history, past surgical history and problem list.    Review of Systems   Constitutional: Positive for activity change (dec). Negative for chills, diaphoresis, fatigue and fever.   HENT: Positive for congestion.    Eyes: Negative for visual disturbance.   Respiratory: Positive for cough (r/t allergies). Negative for chest tightness, shortness of breath and wheezing.    Cardiovascular: Negative.  Negative for chest pain.   Gastrointestinal: Positive for abdominal pain, constipation and diarrhea. Negative for bowel incontinence, nausea and vomiting.   Genitourinary: Negative for bladder incontinence, difficulty urinating and dysuria.   Musculoskeletal: Positive for arthralgias (right foot), back pain and myalgias (fibromyalgia). Negative for joint swelling and neck pain.   Neurological: Positive for weakness and numbness (legs, feet, arms, hands, some areas of the " "back). Negative for dizziness, light-headedness and headaches (\"slight for a couple days\").   Psychiatric/Behavioral: Positive for sleep disturbance. Negative for suicidal ideas. The patient is not nervous/anxious.      Vitals:    04/29/20 0941   PainSc:   4   PainLoc: Foot     Objective   Physical Exam  As this is a telephone check-in, the ability to perform a routine physical exam is extremely limited. The patient seems alert and is oriented appropriately.   On this phone call there is not any evidence of respiratory distress.   The patient seems of normal mood.   The remainder of a routine physical exam is deferred.    Assessment/Plan   Ana Maria was seen today for back pain.    Diagnoses and all orders for this visit:    Encounter for long-term (current) use of medications    SI (sacroiliac) joint dysfunction    Chronic pain syndrome    Disorder of peripheral nervous system      ----------------  Our practice is offering alternative &/or electronic methods to continue to follow our patients while at the same time further the efforts toward social distancing, in accordance with our organizational policies, professional societies' guidance, and gubernatorial mandates.  I support the Healthy at Home campaign and in this visit I have counseled the patient on our needs to limit in-person office visits and physical encounters with medical facilities whenever possible.  I have also educated the patient on the medical necessities of maintaining social distancing while we continue to function during this crisis period.      The patient agreed to a Telephone Check-In. The patient was counseled on the need for a check-in visit. The patient was educated about our efforts to comply with monitoring standards when prescribing potent medications.  ----------------    ---This visit has been rescheduled as a phone visit to comply with patient safety concerns in accordance with CDC recommendations. Total time of discussion was 8 " minutes.  --- The urine drug screen confirmation from 2/3/2020 has been reviewed and the result is appropriate (Last refill of norco 12/5/2019 and Lyrica was 1/16/2020)  based on patient history and POLLY report  --- Due to multiple UDS missing Lyrica we will require an in-office visit in 1 month. REPEAT UDS at that visit.    --- Refill Lyrica 200mg TID. DNF 5/2/2020  --- Refill [place] 5/325. DNF 5/2/2020 applied. Patient appears stable with current regimen. No adverse effects. Regarding continuation of opioids, there is no evidence of aberrant behavior or any red flags.  The patient continues with appropriate response to opioid therapy. ADL's remain intact by self.   --- Follow-up in office 1 month or sooner if needed     POLLY REPORT    As part of the patient's treatment plan, I am prescribing controlled substances. The patient has been made aware of appropriate use of such medications, including potential risk of somnolence, limited ability to drive and/or work safely, and the potential for dependence or overdose. It has also been made clear that these medications are for use by this patient only, without concomitant use of alcohol or other substances unless prescribed.     Patient has completed prescribing agreement detailing terms of continued prescribing of controlled substances, including monitoring POLLY reports, urine drug screening, and pill counts if necessary. The patient is aware that inappropriate use will results in cessation of prescribing such medications.    POLLY report has been reviewed and scanned into the patient's chart.    As the clinician, I personally reviewed the POLLY from 4/28/2020 while the patient was on the telephonic visit today.    History and physical exam exhibit continued safe and appropriate use of controlled substances.    -------    EMR Dragon/Transcription disclaimer:   Much of this encounter note is an electronic transcription/translation of spoken language to printed  text. The electronic translation of spoken language may permit erroneous, or at times, nonsensical words or phrases to be inadvertently transcribed; Although I have reviewed the note for such errors, some may still exist.

## 2020-05-28 ENCOUNTER — OFFICE VISIT (OUTPATIENT)
Dept: PAIN MEDICINE | Facility: CLINIC | Age: 50
End: 2020-05-28

## 2020-05-28 ENCOUNTER — RESULTS ENCOUNTER (OUTPATIENT)
Dept: PAIN MEDICINE | Facility: CLINIC | Age: 50
End: 2020-05-28

## 2020-05-28 VITALS
OXYGEN SATURATION: 98 % | BODY MASS INDEX: 22.43 KG/M2 | DIASTOLIC BLOOD PRESSURE: 84 MMHG | HEIGHT: 68 IN | SYSTOLIC BLOOD PRESSURE: 149 MMHG | WEIGHT: 148 LBS | HEART RATE: 103 BPM | RESPIRATION RATE: 16 BRPM | TEMPERATURE: 97.8 F

## 2020-05-28 DIAGNOSIS — G89.4 CHRONIC PAIN SYNDROME: ICD-10-CM

## 2020-05-28 DIAGNOSIS — G64 DISORDER OF PERIPHERAL NERVOUS SYSTEM: ICD-10-CM

## 2020-05-28 DIAGNOSIS — Z79.899 ENCOUNTER FOR LONG-TERM (CURRENT) USE OF MEDICATIONS: ICD-10-CM

## 2020-05-28 DIAGNOSIS — M53.3 SI (SACROILIAC) JOINT DYSFUNCTION: ICD-10-CM

## 2020-05-28 DIAGNOSIS — Z79.899 ENCOUNTER FOR LONG-TERM (CURRENT) USE OF MEDICATIONS: Primary | ICD-10-CM

## 2020-05-28 LAB
POC AMPHETAMINES: NEGATIVE
POC BARBITURATES: NEGATIVE
POC BENZODIAZEPHINES: NEGATIVE
POC COCAINE: NEGATIVE
POC METHADONE: NEGATIVE
POC METHAMPHETAMINE SCREEN URINE: NEGATIVE
POC OPIATES: POSITIVE
POC OXYCODONE: NEGATIVE
POC PHENCYCLIDINE: NEGATIVE
POC PROPOXYPHENE: NEGATIVE
POC THC: NEGATIVE
POC TRICYCLIC ANTIDEPRESSANTS: NEGATIVE

## 2020-05-28 PROCEDURE — 99214 OFFICE O/P EST MOD 30 MIN: CPT | Performed by: NURSE PRACTITIONER

## 2020-05-28 PROCEDURE — 80305 DRUG TEST PRSMV DIR OPT OBS: CPT | Performed by: NURSE PRACTITIONER

## 2020-05-28 RX ORDER — PREGABALIN 200 MG/1
200 CAPSULE ORAL 3 TIMES DAILY
Qty: 90 CAPSULE | Refills: 0 | Status: SHIPPED | OUTPATIENT
Start: 2020-05-28 | End: 2020-07-01

## 2020-05-28 RX ORDER — HYDROCODONE BITARTRATE AND ACETAMINOPHEN 5; 325 MG/1; MG/1
1 TABLET ORAL EVERY 6 HOURS PRN
Qty: 120 TABLET | Refills: 0 | Status: SHIPPED | OUTPATIENT
Start: 2020-05-28 | End: 2020-06-26 | Stop reason: SDUPTHER

## 2020-05-28 NOTE — PROGRESS NOTES
"CHIEF COMPLAINT  F/U back pain- patient states that her pain has worsened slightly but she believes she is having a flare up of her gout.     Subjective   Ana Maria Story is a 49 y.o. female  who presents for follow-up.She has a history of back pain.  Today her pain is 7/10VAS in severity. She states her back pain has worsened.  She has previously completed bilateral SI injection in November, 2019 with good results.  Currently taking Hydrocodone 5/325 2-3/day, Lyrica 200 mg 3/day and Flexeril 10 mg 2/day PRN. Her medication regimen decreases her pain by 50%. She reports occasional somnolence with the Lyrica, she denies any other side effects. ADLs by self. She denies any changes to bowel or bladder since her last office visit.     Ms. Story has increased your activity and has been walking every day with her grandson.     She states she feels she may have gout in her right foot, she has not been evaluated for this.  I recommended that she contact her PCP regarding this.     Peripheral Neuropathy   This is a chronic problem. The current episode started more than 1 year ago. The problem occurs constantly. The problem has been unchanged. Associated symptoms include arthralgias (right foot), congestion, coughing (r/t allergies), fatigue, myalgias (fibromyalgia), numbness (legs, feet, arms, hands, some areas of the back) and weakness. Pertinent negatives include no abdominal pain, chest pain, chills, diaphoresis, fever, headaches (\"slight for a couple days\"), joint swelling, nausea, neck pain or vomiting. Nothing (cold weather) aggravates the symptoms. She has tried NSAIDs and oral narcotics (Norco 5/325, Lyrica 200 mg TID, Flexeril at HS, Elavil 150 mg HS) for the symptoms. The treatment provided moderate relief.   Back Pain   This is a chronic problem. The current episode started more than 1 year ago. The problem occurs constantly. The problem is unchanged. The pain is present in the lumbar spine, sacro-iliac and " "thoracic spine. The pain is at a severity of 7/10. The pain is moderate. Associated symptoms include numbness (legs, feet, arms, hands, some areas of the back) and weakness. Pertinent negatives include no abdominal pain, bladder incontinence, bowel incontinence, chest pain, dysuria, fever or headaches (\"slight for a couple days\"). She has tried analgesics and muscle relaxant (SI injection, norco, flexeril) for the symptoms. The treatment provided moderate relief.      PEG Assessment   What number best describes your pain on average in the past week?7  What number best describes how, during the past week, pain has interfered with your enjoyment of life?8  What number best describes how, during the past week, pain has interfered with your general activity?  8    The following portions of the patient's history were reviewed and updated as appropriate: allergies, current medications, past family history, past medical history, past social history, past surgical history and problem list.    Review of Systems   Constitutional: Positive for activity change (dec) and fatigue. Negative for chills, diaphoresis and fever.   HENT: Positive for congestion.    Eyes: Negative for visual disturbance.   Respiratory: Positive for cough (r/t allergies). Negative for chest tightness, shortness of breath and wheezing.    Cardiovascular: Negative.  Negative for chest pain.   Gastrointestinal: Positive for diarrhea. Negative for abdominal pain, bowel incontinence, constipation, nausea and vomiting.   Genitourinary: Negative for bladder incontinence, difficulty urinating, dyspareunia and dysuria.   Musculoskeletal: Positive for arthralgias (right foot), back pain and myalgias (fibromyalgia). Negative for joint swelling and neck pain.   Neurological: Positive for dizziness, weakness and numbness (legs, feet, arms, hands, some areas of the back). Negative for light-headedness and headaches (\"slight for a couple days\"). " "  Psychiatric/Behavioral: Positive for sleep disturbance. Negative for agitation and suicidal ideas. The patient is not nervous/anxious.      Vitals:    05/28/20 1012   BP: 149/84   Pulse: 103   Resp: 16   Temp: 97.8 °F (36.6 °C)   SpO2: 98%   Weight: 67.1 kg (148 lb)   Height: 172.7 cm (68\")   PainSc:   7   PainLoc: Back     Objective   Physical Exam   Constitutional: She is oriented to person, place, and time. Vital signs are normal. She appears well-developed and well-nourished.   HENT:   Head: Normocephalic and atraumatic.   Eyes: Conjunctivae, EOM and lids are normal.   Neck: Trachea normal and normal range of motion. Neck supple.   Cardiovascular: Normal rate.   Pulmonary/Chest: Effort normal.   Abdominal: Normal appearance.   Musculoskeletal:        Lumbar back: She exhibits decreased range of motion and tenderness.   POS Ranjit SI compression  POS Ranjit Varinder  POS Ranjit Thigh Thrust   Neurological: She is alert and oriented to person, place, and time. Gait abnormal.   Skin: Skin is warm, dry and intact.   Psychiatric: She has a normal mood and affect. Her speech is normal and behavior is normal. Judgment normal.   Nursing note and vitals reviewed.    Assessment/Plan   Ana Maria was seen today for back pain.    Diagnoses and all orders for this visit:    Encounter for long-term (current) use of medications    Chronic pain syndrome    Disorder of peripheral nervous system    SI (sacroiliac) joint dysfunction  -     Case Request      --- Bilateral SI joint injection   Reviewed the procedure at length with the patient.  Included in the review was expectations, complications, risk and benefits.The procedure was described in detail and the risks, benefits and alternatives were discussed with the patient (including but not limited to: bleeding, infection, nerve damage, worsening of pain, inability to perform injection, paralysis, seizures, and death) who agreed to proceed.  Discussed the potential for sedation if " warranted/wanted.  The procedure will plan to be performed at [facility] with fluoroscopic guidance(unless ultrasound is indicated) and could potentially have steroids and contrast dye used. Questions were answered and in a way the patient could understand.  Patient verbalized understanding and wishes to proceed.  This intervention will be ordered.  Discussed with patient that all procedures are part of a multimodal plan of care and include either formal PT or a home exercise program.  Patient has no evidence of coagulopathy or current infection.  --- Routine UDS in office today as part of monitoring requirements for controlled substances.  The specimen was viewed and the immunoassay result reviewed and is +OPI, last dose of lyrica taken at 2100 on 5/27/2020, she avoids taking this prior to driving to our appointments due to risk of somnolence.  This specimen will be sent to [facility] laboratory for confirmation.     --- Refill [place] 5/325. DNF 6/1/2020 applied. Patient appears stable with current regimen. No adverse effects. Regarding continuation of opioids, there is no evidence of aberrant behavior or any red flags.  The patient continues with appropriate response to opioid therapy. ADL's remain intact by self.   --- Refill Lyrica 200mg. DNF 6/1/2020 applied  --- Refill Compounded Pain Cream  --- Follow-up 2 months or sooner if needed     POLLY FRANKS    As part of the patient's treatment plan, I am prescribing controlled substances. The patient has been made aware of appropriate use of such medications, including potential risk of somnolence, limited ability to drive and/or work safely, and the potential for dependence or overdose. It has also bee made clear that these medications are for use by this patient only, without concomitant use of alcohol or other substances unless prescribed.     Patient has completed prescribing agreement detailing terms of continued prescribing of controlled  substances, including monitoring POLLY reports, urine drug screening, and pill counts if necessary. The patient is aware that inappropriate use will results in cessation of prescribing such medications.    POLLY report has been reviewed and scanned into the patient's chart.    As the clinician, I personally reviewed the POLLY from 5/26/2020.    History and physical exam exhibit continued safe and appropriate use of controlled substances.      EMR Dragon/Transcription disclaimer:   Much of this encounter note is an electronic transcription/translation of spoken language to printed text. The electronic translation of spoken language may permit erroneous, or at times, nonsensical words or phrases to be inadvertently transcribed; Although I have reviewed the note for such errors, some may still exist.

## 2020-06-26 NOTE — TELEPHONE ENCOUNTER
Medication Refill Request    Date of phone call: 20    Medication being requested: norco 5/325mg si tablet by mouth Every 6 (Six) Hours As Needed   Qty: 120    Date of last visit: 20    Date of last refill: 20    POLLY up to date?: yes    Next Follow up?: 20    Any new pertinent information? (i.e, new medication allergies, new use of medications, change in patient's health or condition, non-compliance or inconsistency with prescribing agreement?):

## 2020-06-27 RX ORDER — HYDROCODONE BITARTRATE AND ACETAMINOPHEN 5; 325 MG/1; MG/1
1 TABLET ORAL EVERY 6 HOURS PRN
Qty: 120 TABLET | Refills: 0 | Status: SHIPPED | OUTPATIENT
Start: 2020-07-01 | End: 2020-06-29 | Stop reason: SDUPTHER

## 2020-06-29 NOTE — TELEPHONE ENCOUNTER
Medication Refill Request    Date of phone call: 20    Medication being requested: Norco 5-3025 mg    si tab po q 6 hrs prn  Qty: 120    Date of last visit: 20    Date of last refill: 20    POLLY up to date?: yes    Next Follow up?: 20    Any new pertinent information? (i.e, new medication allergies, new use of medications, change in patient's health or condition, non-compliance or inconsistency with prescribing agreement?):

## 2020-06-30 RX ORDER — HYDROCODONE BITARTRATE AND ACETAMINOPHEN 5; 325 MG/1; MG/1
1 TABLET ORAL EVERY 6 HOURS PRN
Qty: 120 TABLET | Refills: 0 | Status: SHIPPED | OUTPATIENT
Start: 2020-07-01 | End: 2020-07-27 | Stop reason: SDUPTHER

## 2020-07-01 RX ORDER — PREGABALIN 200 MG/1
CAPSULE ORAL
Qty: 90 CAPSULE | Refills: 0 | Status: SHIPPED | OUTPATIENT
Start: 2020-07-01 | End: 2020-07-27 | Stop reason: SDUPTHER

## 2020-07-08 RX ORDER — CYCLOBENZAPRINE HCL 10 MG
10 TABLET ORAL 3 TIMES DAILY PRN
Qty: 90 TABLET | Refills: 3 | Status: SHIPPED | OUTPATIENT
Start: 2020-07-08 | End: 2020-10-23 | Stop reason: SDUPTHER

## 2020-07-10 ENCOUNTER — LAB REQUISITION (OUTPATIENT)
Dept: LAB | Facility: HOSPITAL | Age: 50
End: 2020-07-10

## 2020-07-10 DIAGNOSIS — Z00.00 ENCOUNTER FOR GENERAL ADULT MEDICAL EXAMINATION WITHOUT ABNORMAL FINDINGS: ICD-10-CM

## 2020-07-11 ENCOUNTER — LAB REQUISITION (OUTPATIENT)
Dept: LAB | Facility: HOSPITAL | Age: 50
End: 2020-07-11

## 2020-07-11 DIAGNOSIS — Z00.00 ENCOUNTER FOR GENERAL ADULT MEDICAL EXAMINATION WITHOUT ABNORMAL FINDINGS: ICD-10-CM

## 2020-07-11 PROCEDURE — U0004 COV-19 TEST NON-CDC HGH THRU: HCPCS | Performed by: ANESTHESIOLOGY

## 2020-07-13 LAB
REF LAB TEST METHOD: NORMAL
SARS-COV-2 RNA RESP QL NAA+PROBE: NOT DETECTED

## 2020-07-14 ENCOUNTER — DOCUMENTATION (OUTPATIENT)
Dept: PAIN MEDICINE | Facility: CLINIC | Age: 50
End: 2020-07-14

## 2020-07-14 ENCOUNTER — OUTSIDE FACILITY SERVICE (OUTPATIENT)
Dept: PAIN MEDICINE | Facility: CLINIC | Age: 50
End: 2020-07-14

## 2020-07-14 PROCEDURE — 27096 INJECT SACROILIAC JOINT: CPT | Performed by: ANESTHESIOLOGY

## 2020-07-14 NOTE — PROGRESS NOTES
Bilateral Sacroiliac Joint Injection  [facility]      PREOPERATIVE DIAGNOSIS:   Sacroiliac joint dysfunction, bilateral    POSTOPERATIVE DIAGNOSIS:  Sacroiliac joint dysfunction, bilateral    PROCEDURE:  Sacroiliac Joint Injection, Bilaterally, with fluoroscopic guidance    PRE-PROCEDURE DISCUSSION WITH PATIENT:    Risks and complications were discussed with the patient prior to starting the procedure and informed consent was obtained.  We discussed various topics including but not limited to bleeding, infection, injury, postprocedural site soreness, painful flareup, worsening of clinical picture, paralysis, coma, and death.     SURGEON:  Epifanio Plaza MD    REASON FOR PROCEDURE:    Patient has pain consistent with SI pathology on history and physical exam. Previous clinically significant therapeutic effect of SI joint injection.      SEDATION:  Patient declined administration of moderate sedation    ANESTHETIC AGENT:  Marcaine 0.5%  STEROID AGENT:  Methylprednisolone (DEPO MEDROL) 80mg/ml    DESCRIPTON OF PROCEDURE:  After obtaining informed consent, IV access was not obtained in the preoperative area.  The patient was transported to the operative suite and placed in the prone position with a pillow under the pelvic area. EKG, blood pressure, and pulse oximeter were monitored. The lumbosacral area was prepped with Chloraprep and draped in a sterile fashion. Under fluoroscopic guidance the inferior most portion of the right SI joint was identified. The overlying skin and subcutaneous tissue was anesthetized with 1% lidocaine. A 22-gauge spinal needle was introduced from the inferior most portion of the joint into the RIGHT SI joint under fluoroscopic guidance in the AP dimension with slight oblique rotation to the contralateral side.  Aspiration was negative.  After confirming the position of the needle with fluoroscopy, 1 mL of Omnipaque was injected and after seeing appropriate spread into  the joint a total of 2mL Marcaine, with the steroid, was injected very slowly.  Needle was removed intact.  A similar procedure was performed on the LEFT side.   Vital signs remained stable.  The onset of analgesia was noted.      ESTIMATED BLOOD LOSS:  minimal  SPECIMENS:  None  COMPLICATIONS:  No complications were noted., There was no indication of vascular uptake on live injection of contrast dye. and The patient did not have any signs of postprocedure numbness nor weakness.    TOLERANCE & DISCHARGE CONDITION:    The patient tolerated the procedure well.  The patient was transported to the recovery area without difficulties.  The patient was discharged to home under the care of family in stable and satisfactory condition.    PLAN OF CARE:  1. The patient was given our standard instruction sheet and will resume all medications as per the medication reconciliation sheet.  2. The patient will Return to clinic 2 wks.  3. The patient is instructed to keep a pain log hourly for 8 hours after the procedure.

## 2020-07-27 ENCOUNTER — OFFICE VISIT (OUTPATIENT)
Dept: PAIN MEDICINE | Facility: CLINIC | Age: 50
End: 2020-07-27

## 2020-07-27 VITALS
TEMPERATURE: 98.4 F | OXYGEN SATURATION: 98 % | HEART RATE: 102 BPM | RESPIRATION RATE: 20 BRPM | BODY MASS INDEX: 21.86 KG/M2 | DIASTOLIC BLOOD PRESSURE: 85 MMHG | WEIGHT: 144.2 LBS | SYSTOLIC BLOOD PRESSURE: 145 MMHG | HEIGHT: 68 IN

## 2020-07-27 DIAGNOSIS — Z79.899 ENCOUNTER FOR LONG-TERM (CURRENT) USE OF MEDICATIONS: Primary | ICD-10-CM

## 2020-07-27 DIAGNOSIS — G89.4 CHRONIC PAIN SYNDROME: ICD-10-CM

## 2020-07-27 DIAGNOSIS — M53.3 SACROILIAC JOINT DYSFUNCTION OF BOTH SIDES: ICD-10-CM

## 2020-07-27 DIAGNOSIS — G64 DISORDER OF PERIPHERAL NERVOUS SYSTEM: ICD-10-CM

## 2020-07-27 PROCEDURE — 99214 OFFICE O/P EST MOD 30 MIN: CPT | Performed by: NURSE PRACTITIONER

## 2020-07-27 RX ORDER — HYDROCODONE BITARTRATE AND ACETAMINOPHEN 5; 325 MG/1; MG/1
1 TABLET ORAL EVERY 6 HOURS PRN
Qty: 120 TABLET | Refills: 0 | Status: SHIPPED | OUTPATIENT
Start: 2020-07-27 | End: 2020-08-21 | Stop reason: SDUPTHER

## 2020-07-27 RX ORDER — PREGABALIN 200 MG/1
200 CAPSULE ORAL 3 TIMES DAILY
Qty: 90 CAPSULE | Refills: 0 | Status: SHIPPED | OUTPATIENT
Start: 2020-07-27 | End: 2020-08-21 | Stop reason: SDUPTHER

## 2020-07-27 NOTE — PROGRESS NOTES
"CHIEF COMPLAINT  F/u back pain. Pt had Bilateral Sacroiliac Joint Injection on 7/14/20. Pt sts receiving 60-70% relief x 2 weeks and is still receiving relief from procedure.     Subjective   Ana Maria Story is a 49 y.o. female  who presents for follow-up.  She has a history of back pain and peripheral .  She completed a bilateral Sacroiliac joint injection on 7/14/2020 performed by Dr. Plaza.  She reports 60-70% ONGOING relief from this procedure. Currently taking Hydrocodone 5/325 2-4/day, Lyrica 200 mg 3/day and Flexeril 10 mg 2-3/day PRN. This  Medication regimen reduces her pain by 50%.  ADLs by self. She denies any side effects including somnolence or constipation.     Patient is currently stressed due to family issues r/t custody of her grandson.     Patient complaining of new cramps in her feet, she states she has not had a physical in 2-3 years, I recommend that she f/u with her PCP for a physical. I also informed patient that she can take her flexeril up to TID PRN.     Patient's UDS on 5/28/2020 revealed elevated ethyl glucuronide and ethyl sulfate levels. Patient Counseled on importance of not mixing her medication with alcohol.  Patient states she will not take her medications if she has a glass or 2 of wine and she states that she rarely drinks wine.     Peripheral Neuropathy   This is a chronic problem. The current episode started more than 1 year ago. The problem occurs constantly. The problem has been unchanged. Associated symptoms include arthralgias (right foot), fatigue, myalgias (fibromyalgia), numbness (legs, feet, arms, hands, some areas of the back) and weakness. Pertinent negatives include no abdominal pain, chest pain, chills, congestion, coughing (r/t allergies), diaphoresis, fever, headaches (\"slight for a couple days\"), joint swelling, nausea, neck pain or vomiting. Nothing (cold weather) aggravates the symptoms. She has tried NSAIDs and oral narcotics (Norco 5/325, Lyrica 200 mg TID, " "Flexeril at HS, Elavil 150 mg HS) for the symptoms. The treatment provided moderate relief.   Back Pain   This is a chronic problem. The current episode started more than 1 year ago. The problem occurs constantly. The problem is unchanged. The pain is present in the lumbar spine and thoracic spine. The pain is at a severity of 6/10. The pain is moderate. Associated symptoms include numbness (legs, feet, arms, hands, some areas of the back) and weakness. Pertinent negatives include no abdominal pain, bladder incontinence, bowel incontinence, chest pain, dysuria, fever or headaches (\"slight for a couple days\"). She has tried analgesics and muscle relaxant (SI injection, norco, flexeril) for the symptoms. The treatment provided moderate relief.      PEG Assessment   What number best describes your pain on average in the past week?6  What number best describes how, during the past week, pain has interfered with your enjoyment of life?6  What number best describes how, during the past week, pain has interfered with your general activity?  6    The following portions of the patient's history were reviewed and updated as appropriate: allergies, current medications, past family history, past medical history, past social history, past surgical history and problem list.    Review of Systems   Constitutional: Positive for activity change (dec) and fatigue. Negative for chills, diaphoresis and fever.   HENT: Negative for congestion.    Eyes: Negative for visual disturbance.   Respiratory: Negative for cough (r/t allergies), chest tightness, shortness of breath and wheezing.    Cardiovascular: Negative.  Negative for chest pain.   Gastrointestinal: Positive for diarrhea. Negative for abdominal pain, bowel incontinence, constipation, nausea and vomiting.   Genitourinary: Negative for bladder incontinence, difficulty urinating, dyspareunia and dysuria.   Musculoskeletal: Positive for arthralgias (right foot), back pain and " "myalgias (fibromyalgia). Negative for joint swelling and neck pain.   Neurological: Positive for dizziness, weakness and numbness (legs, feet, arms, hands, some areas of the back). Negative for light-headedness and headaches (\"slight for a couple days\").   Psychiatric/Behavioral: Positive for sleep disturbance. Negative for agitation and suicidal ideas. The patient is not nervous/anxious.      Vitals:    07/27/20 1455   BP: 145/85   Pulse: 102   Resp: 20   Temp: 98.4 °F (36.9 °C)   SpO2: 98%   Weight: 65.4 kg (144 lb 3.2 oz)   Height: 172.7 cm (68\")   PainSc:   6   PainLoc: Back     Objective   Physical Exam   Constitutional: She is oriented to person, place, and time. Vital signs are normal. She appears well-developed and well-nourished.   HENT:   Head: Normocephalic and atraumatic.   Eyes: Conjunctivae, EOM and lids are normal.   Neck: Trachea normal and normal range of motion. Neck supple.   Cardiovascular: Normal rate.   Pulmonary/Chest: Effort normal.   Abdominal: Normal appearance.   Musculoskeletal:        Thoracic back: She exhibits decreased range of motion and tenderness.        Lumbar back: She exhibits decreased range of motion and tenderness.   Neurological: She is alert and oriented to person, place, and time. Gait abnormal.   Skin: Skin is warm, dry and intact.   Psychiatric: She has a normal mood and affect. Her speech is normal and behavior is normal. Judgment normal.   Nursing note and vitals reviewed.    Assessment/Plan   Ana Maria was seen today for back pain.    Diagnoses and all orders for this visit:    Encounter for long-term (current) use of medications    Sacroiliac joint dysfunction of both sides    Chronic pain syndrome    Disorder of peripheral nervous system      --- UDS next office visit.   --- Refill Compounded pain cream  --- The urine drug screen confirmation from 5/28/2020 has been reviewed and the result is appropriate (patient counseled on concomitant alcohol use while on opioid " medication) based on patient history and POLLY report.     --- Refill [place] 5/325. DNF 7/31/2020 applied. Patient appears stable with current regimen. No adverse effects. Regarding continuation of opioids, there is no evidence of aberrant behavior or any red flags.  The patient continues with appropriate response to opioid therapy. ADL's remain intact by self.   --- Refill Lyrica 200mg TID. DNF 7/31/2020 applied. Patient appears stable with current regimen. No adverse effects. Regarding continuation of Lyrica, there is no evidence of aberrant behavior or any red flags.  The patient continues with appropriate response to Lyrica. ADL's remain intact by self.   --- Follow-up 2 months or sooner if needed     POLLY REPORT    As part of the patient's treatment plan, I am prescribing controlled substances. The patient has been made aware of appropriate use of such medications, including potential risk of somnolence, limited ability to drive and/or work safely, and the potential for dependence or overdose. It has also bee made clear that these medications are for use by this patient only, without concomitant use of alcohol or other substances unless prescribed.     Patient has completed prescribing agreement detailing terms of continued prescribing of controlled substances, including monitoring POLLY reports, urine drug screening, and pill counts if necessary. The patient is aware that inappropriate use will results in cessation of prescribing such medications.    POLLY report has been reviewed and scanned into the patient's chart.    As the clinician, I personally reviewed the POLLY from 7/24/2020.    History and physical exam exhibit continued safe and appropriate use of controlled substances.    EMR Dragon/Transcription disclaimer:   Much of this encounter note is an electronic transcription/translation of spoken language to printed text. The electronic translation of spoken language may permit erroneous, or at  times, nonsensical words or phrases to be inadvertently transcribed; Although I have reviewed the note for such errors, some may still exist.

## 2020-08-21 NOTE — TELEPHONE ENCOUNTER
Medication Refill Request    Date of phone call: 2020    Medication being requested: hydro/apap 5-325 mg si tab q  6 hrs prn   Qty: 120    Date of last visit: 2020    Date of last refill: 2020    POLLY up to date?: yes    Next Follow up?: 2020    Any new pertinent information? (i.e, new medication allergies, new use of medications, change in patient's health or condition, non-compliance or inconsistency with prescribing agreement?): would also like a refill on Lyrica

## 2020-08-26 RX ORDER — PREGABALIN 200 MG/1
200 CAPSULE ORAL 3 TIMES DAILY
Qty: 90 CAPSULE | Refills: 0 | Status: SHIPPED | OUTPATIENT
Start: 2020-08-26 | End: 2020-09-28 | Stop reason: SDUPTHER

## 2020-08-26 RX ORDER — HYDROCODONE BITARTRATE AND ACETAMINOPHEN 5; 325 MG/1; MG/1
1 TABLET ORAL EVERY 6 HOURS PRN
Qty: 120 TABLET | Refills: 0 | Status: SHIPPED | OUTPATIENT
Start: 2020-08-26 | End: 2020-09-28 | Stop reason: SDUPTHER

## 2020-08-31 ENCOUNTER — PRIOR AUTHORIZATION (OUTPATIENT)
Dept: PAIN MEDICINE | Facility: CLINIC | Age: 50
End: 2020-08-31

## 2020-09-01 RX ORDER — PREGABALIN 200 MG/1
CAPSULE ORAL
Qty: 90 CAPSULE | OUTPATIENT
Start: 2020-09-01

## 2020-09-19 ENCOUNTER — HOSPITAL ENCOUNTER (OUTPATIENT)
Dept: URGENT CARE | Facility: CLINIC | Age: 50
Discharge: HOME | End: 2020-09-19
Attending: EMERGENCY MEDICINE

## 2020-09-21 LAB — BACTERIA SPEC AEROBE CULT: NORMAL

## 2020-09-23 LAB — SARS-COV-2 RNA SPEC QL NAA+PROBE: NOT DETECTED

## 2020-09-25 ENCOUNTER — TELEPHONE (OUTPATIENT)
Dept: PAIN MEDICINE | Facility: CLINIC | Age: 50
End: 2020-09-25

## 2020-09-25 NOTE — TELEPHONE ENCOUNTER
I don't know that they will let her in with the dry cough without COVID being ruled out if the cough is not improving. If her cough persists on Monday we can switch her to video visit, if it improves over the weekend then we can see her.

## 2020-09-25 NOTE — TELEPHONE ENCOUNTER
Pt called today stating she and her  went to [facility] last weekend with URI, she was not given antibiotics, sent home with tessalon perles and was told to treat symptoms with otc meds. She sts she and her  had covid 19 tests which came back negative. Pt sts she has not been in contact with anyone tested positive for covid 19 in the past 14 days, she doesn't have a fever or SOA or loss of taste or smell. She sts she has had a dry cough. She has an appt with you next Monday at 0900, would you like her to keep her appt and come in to the office or change her to video visit? Please advise. Thank you.

## 2020-09-28 ENCOUNTER — TELEMEDICINE (OUTPATIENT)
Dept: PAIN MEDICINE | Facility: CLINIC | Age: 50
End: 2020-09-28

## 2020-09-28 DIAGNOSIS — G89.4 CHRONIC PAIN SYNDROME: ICD-10-CM

## 2020-09-28 DIAGNOSIS — G64 DISORDER OF PERIPHERAL NERVOUS SYSTEM: ICD-10-CM

## 2020-09-28 DIAGNOSIS — Z79.899 ENCOUNTER FOR LONG-TERM (CURRENT) USE OF MEDICATIONS: Primary | ICD-10-CM

## 2020-09-28 DIAGNOSIS — M53.3 SACROILIAC JOINT DYSFUNCTION OF BOTH SIDES: ICD-10-CM

## 2020-09-28 PROCEDURE — 99214 OFFICE O/P EST MOD 30 MIN: CPT | Performed by: NURSE PRACTITIONER

## 2020-09-28 RX ORDER — PREGABALIN 200 MG/1
200 CAPSULE ORAL 3 TIMES DAILY
Qty: 90 CAPSULE | Refills: 0 | Status: SHIPPED | OUTPATIENT
Start: 2020-09-28 | End: 2020-10-23 | Stop reason: SDUPTHER

## 2020-09-28 RX ORDER — HYDROCODONE BITARTRATE AND ACETAMINOPHEN 5; 325 MG/1; MG/1
1 TABLET ORAL EVERY 6 HOURS PRN
Qty: 120 TABLET | Refills: 0 | Status: SHIPPED | OUTPATIENT
Start: 2020-09-28 | End: 2020-10-23 | Stop reason: SDUPTHER

## 2020-09-28 NOTE — PROGRESS NOTES
TELEMEDICINE - VIDEO VISIT    CHIEF COMPLAINT: F/u Back pain and peripheral neuropathy    Subjective   Ana Maria Story is a 49 y.o. female  who presents for a video visit follow-up.She has a history of back pain and peripheral neuropathy.  Today her pain is 4/10VAS in severity    Currently taking Hydrocodone 5/325 3-4/day, Lyrica 200 mg 3/day, Flexeril 10 mg 2-3/day PRN, and compounded pain cream. This medication regimen decreases her apin by 50%. She denies any side effects including somnolence or constipation. ADLs by self.     Patient currently has an upper respiratory infection, COVID19 test was negative. Patient now states she is vomiting.     Patient reports No alcohol intake since her last office visit.     Peripheral Neuropathy  This is a chronic problem. The current episode started more than 1 year ago. The problem occurs constantly. The problem has been unchanged. Associated symptoms include arthralgias, congestion, coughing, fatigue, headaches, myalgias (fibromyalgia), nausea, numbness, a sore throat, vomiting and weakness. Pertinent negatives include no abdominal pain, chest pain, chills, diaphoresis, fever, joint swelling or neck pain. Nothing (cold weather) aggravates the symptoms. She has tried NSAIDs and oral narcotics (Norco 5/325, Lyrica 200 mg TID, Flexeril at HS, Elavil 150 mg HS) for the symptoms. The treatment provided moderate relief.   Back Pain  This is a chronic problem. The current episode started more than 1 year ago. The problem occurs constantly. The problem is unchanged. The pain is present in the lumbar spine and thoracic spine. The pain is at a severity of 4/10. The pain is moderate. Associated symptoms include headaches, numbness and weakness. Pertinent negatives include no abdominal pain, bladder incontinence, bowel incontinence, chest pain, dysuria or fever. She has tried analgesics and muscle relaxant (SI injection, norco, flexeril) for the symptoms. The treatment provided  moderate relief.      The following portions of the patient's history were reviewed and updated as appropriate: allergies, current medications, past family history, past medical history, past social history, past surgical history and problem list.    Review of Systems   Constitutional: Positive for fatigue. Negative for chills, diaphoresis and fever.   HENT: Positive for congestion, sinus pressure, sinus pain and sore throat.    Respiratory: Positive for cough. Negative for shortness of breath.    Cardiovascular: Negative for chest pain and palpitations.   Gastrointestinal: Positive for nausea and vomiting. Negative for abdominal pain, bowel incontinence, constipation and diarrhea.   Genitourinary: Negative for bladder incontinence, difficulty urinating and dysuria.   Musculoskeletal: Positive for arthralgias, back pain and myalgias (fibromyalgia). Negative for joint swelling and neck pain.   Neurological: Positive for weakness, numbness and headaches. Negative for dizziness.   Psychiatric/Behavioral: Negative for agitation, confusion and suicidal ideas. The patient is not nervous/anxious.      There were no vitals filed for this visit.    Objective   Physical Exam  Constitutional:       Appearance: She is well-developed.   HENT:      Head: Normocephalic.      Nose: Congestion (sounds congested when talking) present.   Pulmonary:      Effort: Pulmonary effort is normal.   Neurological:      Mental Status: She is alert and oriented to person, place, and time.   Psychiatric:         Speech: Speech normal.         Behavior: Behavior normal.         Thought Content: Thought content normal.         Judgment: Judgment normal.       Assessment/Plan   Diagnoses and all orders for this visit:    Encounter for long-term (current) use of medications    Chronic pain syndrome    Disorder of peripheral nervous system    Sacroiliac joint dysfunction of both sides    Other orders  -     HYDROcodone-acetaminophen (NORCO) 5-325 MG  per tablet; Take 1 tablet by mouth Every 6 (Six) Hours As Needed for Severe Pain . DNF 9/29/2020  -     pregabalin (LYRICA) 200 MG capsule; Take 1 capsule by mouth 3 (Three) Times a Day. DNF 9/29/2020      ----------------  Our practice is offering alternative &/or electronic methods to continue to follow our patients while at the same time further the efforts toward social distancing, in accordance with our organizational policies, professional societies' guidance, and [facility] mandates.  I support the Healthy at Home campaign and in this visit I have counseled the patient on our needs to limit in-person office visits and physical encounters with medical facilities whenever possible.  I have also educated the patient on the medical necessities of maintaining social distancing while we continue to function during this crisis period.      The patient was counseled on the need to consider telehealth options. The patient was offered the opportunity for a Video Visit using [employer]. The patient agreed to a Video Visit. The patient was educated about our efforts to comply with monitoring standards when prescribing potent medications.    TIME:  Total Time:  14 minutes.  ----------------    --- The urine drug screen confirmation from 5/28/2020 has been reviewed and the result shows high ethyl glucuronide and ethyl sulfate. Patient counseled at 7/27/20 office visit.   --- Refill Compounded pain cream  --- Refill Lyrica 200 mg TID  --- Refill Norco 7.5/325. DNF 9/29/2020 applied. Patient appears stable with current regimen. No adverse effects. Regarding continuation of opioids, there is no evidence of aberrant behavior or any red flags.  The patient continues with appropriate response to opioid therapy. ADL's remain intact by self.   --- Follow-up 2 months or sooner if needed     POLLY REPORT  As part of the patient's treatment plan, I am prescribing controlled substances. The patient has been made aware of appropriate  use of such medications, including potential risk of somnolence, limited ability to drive and/or work safely, and the potential for dependence or overdose. It has also bee made clear that these medications are for use by this patient only, without concomitant use of alcohol or other substances unless prescribed.     Patient has completed prescribing agreement detailing terms of continued prescribing of controlled substances, including monitoring POLLY reports, urine drug screening, and pill counts if necessary. The patient is aware that inappropriate use will results in cessation of prescribing such medications.    POLLY report has been reviewed within Epic.    As the clinician, I personally reviewed the POLLY from 9/28/2020 while the patient was in the office today.    History and physical exam exhibit continued safe and appropriate use of controlled substances.    -------    EMR Dragon/Transcription disclaimer:   Much of this encounter note is an electronic transcription/translation of spoken language to printed text. The electronic translation of spoken language may permit erroneous, or at times, nonsensical words or phrases to be inadvertently transcribed; Although I have reviewed the note for such errors, some may still exist.

## 2020-10-23 DIAGNOSIS — Z51.81 MEDICATION MONITORING ENCOUNTER: Primary | ICD-10-CM

## 2020-10-23 RX ORDER — CYCLOBENZAPRINE HCL 10 MG
10 TABLET ORAL 3 TIMES DAILY PRN
Qty: 90 TABLET | Refills: 3 | Status: SHIPPED | OUTPATIENT
Start: 2020-10-23 | End: 2020-11-23 | Stop reason: SDUPTHER

## 2020-10-23 RX ORDER — PREGABALIN 200 MG/1
200 CAPSULE ORAL 3 TIMES DAILY
Qty: 90 CAPSULE | Refills: 0 | Status: SHIPPED | OUTPATIENT
Start: 2020-10-23 | End: 2020-11-23 | Stop reason: SDUPTHER

## 2020-10-23 RX ORDER — HYDROCODONE BITARTRATE AND ACETAMINOPHEN 5; 325 MG/1; MG/1
1 TABLET ORAL EVERY 6 HOURS PRN
Qty: 120 TABLET | Refills: 0 | Status: SHIPPED | OUTPATIENT
Start: 2020-10-23 | End: 2020-11-23 | Stop reason: SDUPTHER

## 2020-10-23 NOTE — TELEPHONE ENCOUNTER
I have ordered a CBC to be completed per protocol for her Lyrica prescription. Please notify patient.    Reviewed UDS and POLLY. Both updated and appropriate. Refill appropriate.

## 2020-10-23 NOTE — TELEPHONE ENCOUNTER
Medication Refill Request    Date of phone call: 10/23/20    Medication being requested: norco 5/325mg si tab po q 6 hours prn   Qty: 120    Date of last visit: 20    Date of last refill:     POLLY up to date?:     Next Follow up?: 20    Any new pertinent information? (i.e, new medication allergies, new use of medications, change in patient's health or condition, non-compliance or inconsistency with prescribing agreement?):     Medication Refill Request    Date of phone call: 10/23/20    Medication being requested: lyrica 200mg si caps po tid   Qty: 90    Date of last visit: 20    Date of last refill:     POLLY up to date?:     Next Follow up?: 20    Any new pertinent information? (i.e, new medication allergies, new use of medications, change in patient's health or condition, non-compliance or inconsistency with prescribing agreement?):     Medication Refill Request    Date of phone call: 10/23/20    Medication being requested: flexeril 10mg si tab po tid prn   Qty: 90    Date of last visit: 20    Date of last refill:     POLLY up to date?:     Next Follow up?: 20    Any new pertinent information? (i.e, new medication allergies, new use of medications, change in patient's health or condition, non-compliance or inconsistency with prescribing agreement?):

## 2020-11-23 ENCOUNTER — RESULTS ENCOUNTER (OUTPATIENT)
Dept: PAIN MEDICINE | Facility: CLINIC | Age: 50
End: 2020-11-23

## 2020-11-23 ENCOUNTER — OFFICE VISIT (OUTPATIENT)
Dept: PAIN MEDICINE | Facility: CLINIC | Age: 50
End: 2020-11-23

## 2020-11-23 VITALS
HEIGHT: 68 IN | WEIGHT: 149.6 LBS | OXYGEN SATURATION: 100 % | HEART RATE: 105 BPM | SYSTOLIC BLOOD PRESSURE: 163 MMHG | RESPIRATION RATE: 20 BRPM | TEMPERATURE: 98.2 F | BODY MASS INDEX: 22.67 KG/M2 | DIASTOLIC BLOOD PRESSURE: 96 MMHG

## 2020-11-23 DIAGNOSIS — G89.4 CHRONIC PAIN SYNDROME: ICD-10-CM

## 2020-11-23 DIAGNOSIS — Z79.899 ENCOUNTER FOR LONG-TERM (CURRENT) USE OF MEDICATIONS: Primary | ICD-10-CM

## 2020-11-23 DIAGNOSIS — M79.606 PAIN OF LOWER EXTREMITY, UNSPECIFIED LATERALITY: ICD-10-CM

## 2020-11-23 DIAGNOSIS — G63 B12 NEUROPATHY (HCC): ICD-10-CM

## 2020-11-23 DIAGNOSIS — M53.3 SACROILIAC JOINT DYSFUNCTION OF BOTH SIDES: ICD-10-CM

## 2020-11-23 DIAGNOSIS — E53.8 B12 NEUROPATHY (HCC): ICD-10-CM

## 2020-11-23 DIAGNOSIS — Z79.899 ENCOUNTER FOR LONG-TERM (CURRENT) USE OF MEDICATIONS: ICD-10-CM

## 2020-11-23 PROCEDURE — 80305 DRUG TEST PRSMV DIR OPT OBS: CPT | Performed by: NURSE PRACTITIONER

## 2020-11-23 PROCEDURE — 99214 OFFICE O/P EST MOD 30 MIN: CPT | Performed by: NURSE PRACTITIONER

## 2020-11-23 RX ORDER — CYCLOBENZAPRINE HCL 10 MG
10 TABLET ORAL 3 TIMES DAILY PRN
Qty: 90 TABLET | Refills: 3 | Status: SHIPPED | OUTPATIENT
Start: 2020-11-23 | End: 2021-03-23 | Stop reason: SDUPTHER

## 2020-11-23 RX ORDER — HYDROCODONE BITARTRATE AND ACETAMINOPHEN 5; 325 MG/1; MG/1
1 TABLET ORAL EVERY 6 HOURS PRN
Qty: 120 TABLET | Refills: 0 | Status: SHIPPED | OUTPATIENT
Start: 2020-11-23 | End: 2020-11-30 | Stop reason: SDUPTHER

## 2020-11-23 RX ORDER — PREGABALIN 200 MG/1
200 CAPSULE ORAL 3 TIMES DAILY
Qty: 90 CAPSULE | Refills: 5 | Status: SHIPPED | OUTPATIENT
Start: 2020-11-23 | End: 2021-05-24 | Stop reason: SDUPTHER

## 2020-11-23 RX ORDER — BENZONATATE 100 MG/1
CAPSULE ORAL
Status: ON HOLD | COMMUNITY
Start: 2020-09-19 | End: 2021-06-30

## 2020-11-23 NOTE — PROGRESS NOTES
"CHIEF COMPLAINT  F/u back pain and peripheral neuropathy. Pt sts pain is the same.     Subjective   Ana Maria Story is a 50 y.o. female  who presents for follow-up.  She has a history of back pain and neuropathic pain.  Today her pain is 3/10VAS in severity.  She is currently maintained on Hydrocodone 5/325 2-4/day, Lyrica 200 mg 3/day, Flexeril 10 mg 3/day PRN, and compounded pain cream. This medication regimen decreases her pain by 75%. \"I've been able to move around and keep up with my 2 year old grandson\". She denies any side effects including somnolence or constipation. ADLs by self.       She is currently helping to care for her Father-in-law who was just diagnosed with cancer, this has caused increase stress in her life.     Discussed her BP.  She denies any chest pain, shortness of breath, headaches, or palpitations. Encouraged her to make appointment with PCP.  Unfortunately she states she does not know where her PCP is practicing now.     Patient remained masked during entire encounter. No cough present. I donned a mask and eye protection throughout entire visit. Prior to donning mask and eye protection, hand hygiene was performed, as well as when it was doffed.  I was closer than 6 feet, but not for an extended period of time. No obvious exposure to any bodily fluids.    Peripheral Neuropathy  This is a chronic problem. The current episode started more than 1 year ago. The problem occurs constantly. The problem has been unchanged. Associated symptoms include arthralgias, myalgias (fibromyalgia), nausea, numbness, a sore throat and vomiting. Pertinent negatives include no abdominal pain, chest pain, chills, congestion, coughing, diaphoresis, fatigue, fever, headaches, joint swelling, neck pain or weakness. Nothing (cold weather) aggravates the symptoms. She has tried NSAIDs and oral narcotics (Norco 5/325, Lyrica 200 mg TID, Flexeril at HS, Elavil 150 mg HS) for the symptoms. The treatment provided " moderate relief.   Back Pain  This is a chronic problem. The current episode started more than 1 year ago. The problem occurs constantly. The problem is unchanged. The pain is present in the lumbar spine and thoracic spine. The pain is at a severity of 3/10. The pain is moderate. Associated symptoms include numbness. Pertinent negatives include no abdominal pain, bladder incontinence, bowel incontinence, chest pain, dysuria, fever, headaches or weakness. She has tried analgesics and muscle relaxant (SI injection, norco, flexeril) for the symptoms. The treatment provided moderate relief.      PEG Assessment   What number best describes your pain on average in the past week?3  What number best describes how, during the past week, pain has interfered with your enjoyment of life?3  What number best describes how, during the past week, pain has interfered with your general activity?  3    The following portions of the patient's history were reviewed and updated as appropriate: allergies, current medications, past family history, past medical history, past social history, past surgical history and problem list.    Review of Systems   Constitutional: Negative for activity change, chills, diaphoresis, fatigue and fever.   HENT: Positive for sore throat. Negative for congestion.    Eyes: Negative for visual disturbance.   Respiratory: Negative for cough and shortness of breath.    Cardiovascular: Negative for chest pain.   Gastrointestinal: Positive for nausea and vomiting. Negative for abdominal pain, bowel incontinence, constipation and diarrhea.   Endocrine: Negative for polyuria.   Genitourinary: Negative for bladder incontinence, difficulty urinating and dysuria.   Musculoskeletal: Positive for arthralgias, back pain and myalgias (fibromyalgia). Negative for gait problem, joint swelling and neck pain.   Allergic/Immunologic: Negative for immunocompromised state.   Neurological: Positive for numbness. Negative for  "dizziness, weakness, light-headedness and headaches.   Psychiatric/Behavioral: Negative for agitation, sleep disturbance and suicidal ideas. The patient is not nervous/anxious.      --  The aforementioned information the Chief Complaint section and above subjective data including any HPI data, and also the Review of Systems data, has been personally reviewed and affirmed.  --    Vitals:    11/23/20 0852 11/23/20 0934   BP:  163/96   BP Location:  Right arm   Patient Position:  Sitting   Pulse:  105   Resp: 20    Temp: 98.2 °F (36.8 °C)    SpO2:  100%   Weight: 67.9 kg (149 lb 9.6 oz)    Height: 172.7 cm (68\")    PainSc:   3    PainLoc: Back      Objective   Physical Exam  Vitals signs and nursing note reviewed.   Constitutional:       Appearance: Normal appearance. She is well-developed.   HENT:      Head: Normocephalic and atraumatic.   Eyes:      General: Lids are normal.      Conjunctiva/sclera: Conjunctivae normal.   Neck:      Musculoskeletal: Normal range of motion.      Trachea: Trachea normal.   Cardiovascular:      Rate and Rhythm: Normal rate.   Pulmonary:      Effort: Pulmonary effort is normal.   Musculoskeletal:      Lumbar back: She exhibits decreased range of motion and tenderness.      Comments: POS Bilateral SI Compression  POS Bilateral Varinder (L>R)  POS Bilateral Thigh Thrust (L>R)   Skin:     General: Skin is warm and dry.   Neurological:      Mental Status: She is alert and oriented to person, place, and time.      Gait: Gait abnormal.   Psychiatric:         Speech: Speech normal.         Behavior: Behavior normal.         Judgment: Judgment normal.       Assessment/Plan   Diagnoses and all orders for this visit:    1. Encounter for long-term (current) use of medications (Primary)    2. Sacroiliac joint dysfunction of both sides  -     Case Request    3. Chronic pain syndrome    4. Pain of lower extremity, unspecified laterality    5. B12 neuropathy (CMS/HCC)    Other orders  -     " cyclobenzaprine (FLEXERIL) 10 MG tablet; Take 1 tablet by mouth 3 (Three) Times a Day As Needed for Muscle Spasms.  Dispense: 90 tablet; Refill: 3  -     HYDROcodone-acetaminophen (NORCO) 5-325 MG per tablet; Take 1 tablet by mouth Every 6 (Six) Hours As Needed for Severe Pain . DNF 11/28/2020  Dispense: 120 tablet; Refill: 0  -     pregabalin (LYRICA) 200 MG capsule; Take 1 capsule by mouth 3 (Three) Times a Day. DNF 11/28/2020  Dispense: 90 capsule; Refill: 5      --- Bilateral Sacroiliac joint injections  Reviewed the procedure at length with the patient.  Included in the review was expectations, complications, risk and benefits.The procedure was described in detail and the risks, benefits and alternatives were discussed with the patient (including but not limited to: bleeding, infection, nerve damage, worsening of pain, inability to perform injection, paralysis, seizures, and death) who agreed to proceed.  Discussed the potential for sedation if warranted/wanted.  The procedure will plan to be performed at [facility] with fluoroscopic guidance(unless ultrasound is indicated) and could potentially have steroids and contrast dye used. Questions were answered and in a way the patient could understand.  Patient verbalized understanding and wishes to proceed.  This intervention will be ordered.  Discussed with patient that all procedures are part of a multimodal plan of care and include either formal PT or a home exercise program.  Patient has no evidence of coagulopathy or current infection.  --- Routine UDS in office today as part of monitoring requirements for controlled substances.  The specimen was viewed and the immunoassay result reviewed and is +OPI.  This specimen will be [facility].     --- Refill [place] 5/325. DNF 11/28/2020 applied. Patient appears stable with current regimen. No adverse effects. Regarding continuation of opioids, there is no  evidence of aberrant behavior or any red flags.  The patient continues with appropriate response to opioid therapy. ADL's remain intact by self.   --- Follow-up 2 months or sooner if needed     POLLY REPORT  As part of the patient's treatment plan, I am prescribing controlled substances. The patient has been made aware of appropriate use of such medications, including potential risk of somnolence, limited ability to drive and/or work safely, and the potential for dependence or overdose. It has also bee made clear that these medications are for use by this patient only, without concomitant use of alcohol or other substances unless prescribed.     Patient has completed prescribing agreement detailing terms of continued prescribing of controlled substances, including monitoring POLLY reports, urine drug screening, and pill counts if necessary. The patient is aware that inappropriate use will results in cessation of prescribing such medications.    POLLY report has been reviewed and scanned into the patient's chart.    As the clinician, I personally reviewed the POLLY from 11/23/2020 while the patient was in the office today.    History and physical exam exhibit continued safe and appropriate use of controlled substances.    EMR Dragon/Transcription disclaimer:   Much of this encounter note is an electronic transcription/translation of spoken language to printed text. The electronic translation of spoken language may permit erroneous, or at times, nonsensical words or phrases to be inadvertently transcribed; Although I have reviewed the note for such errors, some may still exist.

## 2020-11-30 RX ORDER — HYDROCODONE BITARTRATE AND ACETAMINOPHEN 5; 325 MG/1; MG/1
1 TABLET ORAL EVERY 6 HOURS PRN
Qty: 120 TABLET | Refills: 0 | Status: SHIPPED | OUTPATIENT
Start: 2020-11-30 | End: 2020-12-28 | Stop reason: SDUPTHER

## 2020-11-30 NOTE — TELEPHONE ENCOUNTER
Medication Refill Request    Date of phone call: 20    Medication being requested: norco 5/325mg si tab po q 6 hours prn   Qty: 120    Date of last visit: 20    Date of last refill:     POLLY up to date?:     Next Follow up?: 20    Any new pertinent information? (i.e, new medication allergies, new use of medications, change in patient's health or condition, non-compliance or inconsistency with prescribing agreement?): [facility] called this morning stg their system kicked out the Rx norco when they tried to fill it, since Evy not in office today, can you please send new Rx to [facility] for [facility] for pt? Please advise. Thank you.

## 2020-12-18 ENCOUNTER — LAB REQUISITION (OUTPATIENT)
Dept: LAB | Facility: HOSPITAL | Age: 50
End: 2020-12-18

## 2020-12-18 DIAGNOSIS — Z00.00 ENCOUNTER FOR GENERAL ADULT MEDICAL EXAMINATION WITHOUT ABNORMAL FINDINGS: ICD-10-CM

## 2020-12-21 PROCEDURE — U0004 COV-19 TEST NON-CDC HGH THRU: HCPCS | Performed by: ANESTHESIOLOGY

## 2020-12-22 LAB — SARS-COV-2 RNA RESP QL NAA+PROBE: NOT DETECTED

## 2020-12-23 ENCOUNTER — OUTSIDE FACILITY SERVICE (OUTPATIENT)
Dept: PAIN MEDICINE | Facility: CLINIC | Age: 50
End: 2020-12-23

## 2020-12-23 ENCOUNTER — DOCUMENTATION (OUTPATIENT)
Dept: PAIN MEDICINE | Facility: CLINIC | Age: 50
End: 2020-12-23

## 2020-12-23 PROCEDURE — 27096 INJECT SACROILIAC JOINT: CPT | Performed by: ANESTHESIOLOGY

## 2020-12-23 NOTE — PROGRESS NOTES
Bilateral Sacroiliac Joint Injection  [facility]      PREOPERATIVE DIAGNOSIS:   Sacroiliac joint dysfunction, bilateral    POSTOPERATIVE DIAGNOSIS:  Sacroiliac joint dysfunction, bilateral    PROCEDURE:  Sacroiliac Joint Injection, Bilaterally, with fluoroscopic guidance    PRE-PROCEDURE DISCUSSION WITH PATIENT:    Risks and complications were discussed with the patient prior to starting the procedure and informed consent was obtained.  We discussed various topics including but not limited to bleeding, infection, injury, postprocedural site soreness, painful flareup, worsening of clinical picture, paralysis, coma, and death.     SURGEON:  Epifanio Plaza MD    REASON FOR PROCEDURE:    Positive sacroiliac provocation maneuvers noted.   Previous clinically significant therapeutic effect of SI joint injection.      SEDATION:  Patient declined administration of moderate sedation    ANESTHETIC AGENT:  Marcaine 0.5%  STEROID AGENT:  Methylprednisolone (DEPO MEDROL) 80mg/ml    DESCRIPTON OF PROCEDURE:  After obtaining informed consent, IV access was not obtained in the preoperative area.  The patient was transported to the operative suite and placed in the prone position with a pillow under the pelvic area. EKG, blood pressure, and pulse oximeter were monitored. The lumbosacral area was prepped with Chloraprep and draped in a sterile fashion. Under fluoroscopic guidance the inferior most portion of the right SI joint was identified. The overlying skin and subcutaneous tissue was anesthetized with 1% lidocaine. A 22-gauge spinal needle was introduced from the inferior most portion of the joint into the RIGHT SI joint under fluoroscopic guidance in the AP dimension with slight oblique rotation to the contralateral side.  Aspiration was negative.  After confirming the position of the needle with fluoroscopy, 1 mL of Omnipaque was injected and after seeing appropriate spread into the joint a total of 2mL  Marcaine, with the steroid, was injected very slowly.  Needle was removed intact.  A similar procedure was performed on the LEFT side.   Vital signs remained stable.  The onset of analgesia was noted.      ESTIMATED BLOOD LOSS:  minimal  SPECIMENS:  None  COMPLICATIONS:  No complications were noted., There was no indication of vascular uptake on live injection of contrast dye. and The patient did not have any signs of postprocedure numbness nor weakness.    TOLERANCE & DISCHARGE CONDITION:    The patient tolerated the procedure well.  The patient was transported to the recovery area without difficulties.  The patient was discharged to home under the care of family in stable and satisfactory condition.    PLAN OF CARE:  1. The patient was given our standard instruction sheet and will resume all medications as per the medication reconciliation sheet.  2. The patient will Return to clinic 4-6 wks.  3. The patient is instructed to keep a pain log hourly for 8 hours after the procedure.

## 2020-12-28 NOTE — TELEPHONE ENCOUNTER
Medication Refill Request    Date of phone call: 12/28/20    Medication being requested: Norco 5/325 mg sig: q6hrs PRN  Qty: 120    Date of last visit: 11/23/20    Date of last refill: 11/30/20    POLLY up to date?: 11/20/20    Next Follow up?: 1/22/21    Any new pertinent information? (i.e, new medication allergies, new use of medications, change in patient's health or condition, non-compliance or inconsistency with prescribing agreement?): n/a

## 2020-12-29 RX ORDER — HYDROCODONE BITARTRATE AND ACETAMINOPHEN 5; 325 MG/1; MG/1
1 TABLET ORAL EVERY 6 HOURS PRN
Qty: 120 TABLET | Refills: 0 | Status: SHIPPED | OUTPATIENT
Start: 2020-12-29 | End: 2021-01-22 | Stop reason: SDUPTHER

## 2021-01-22 ENCOUNTER — OFFICE VISIT (OUTPATIENT)
Dept: PAIN MEDICINE | Facility: CLINIC | Age: 51
End: 2021-01-22

## 2021-01-22 VITALS
BODY MASS INDEX: 21.82 KG/M2 | OXYGEN SATURATION: 98 % | DIASTOLIC BLOOD PRESSURE: 93 MMHG | SYSTOLIC BLOOD PRESSURE: 136 MMHG | WEIGHT: 144 LBS | HEIGHT: 68 IN | RESPIRATION RATE: 18 BRPM | HEART RATE: 100 BPM | TEMPERATURE: 97.4 F

## 2021-01-22 DIAGNOSIS — G64 DISORDER OF PERIPHERAL NERVOUS SYSTEM: ICD-10-CM

## 2021-01-22 DIAGNOSIS — M53.3 SI (SACROILIAC) JOINT DYSFUNCTION: Primary | ICD-10-CM

## 2021-01-22 DIAGNOSIS — G89.4 CHRONIC PAIN SYNDROME: ICD-10-CM

## 2021-01-22 DIAGNOSIS — Z79.899 ENCOUNTER FOR LONG-TERM (CURRENT) USE OF MEDICATIONS: ICD-10-CM

## 2021-01-22 PROCEDURE — 99214 OFFICE O/P EST MOD 30 MIN: CPT | Performed by: NURSE PRACTITIONER

## 2021-01-22 RX ORDER — HYDROCODONE BITARTRATE AND ACETAMINOPHEN 5; 325 MG/1; MG/1
1 TABLET ORAL EVERY 6 HOURS PRN
Qty: 120 TABLET | Refills: 0 | Status: SHIPPED | OUTPATIENT
Start: 2021-01-22 | End: 2021-02-23 | Stop reason: SDUPTHER

## 2021-01-22 NOTE — PROGRESS NOTES
"CHIEF COMPLAINT  Back pain and peripheral neuropathy is unchanged since last visit    Subjective   Ana Maria Story is a 50 y.o. female  who presents to the office for follow-up of procedure.  She completed a Bilateral Sacroiliac Joint Injection   on  12/23/20 performed by Dr. Plaza for management of back pain. Patient reports 75% ONGOING relief from the procedure.     Today her pain is 2/10VAS in severity. She presents with  She is currently maintained on Hydrocodone 5/325 2-4/day, Lyrica 200 mg 3/day, Flexeril 10 mg 2/day PRN, and compounded pain cream.  This regimen decreases her pain by a moderate amount. \"It takes my pain down to where I can function and do what I need to do\".  She denies any side effects including somnolence or constipation. ADLs by self.  She denies any changes to bowel or bladder since her last office visit.     Today also complains of pain in her left foot today.  She states she is going to call her PCP for referral to a podiatrist.     She has made over 4,000 masks to donate during the COVID19 pandemic.     Patient remained masked during entire encounter. No cough present. I donned a mask and eye protection throughout entire visit. Prior to donning mask and eye protection, hand hygiene was performed, as well as when it was doffed.  I was closer than 6 feet, but not for an extended period of time. No obvious exposure to any bodily fluids.    Peripheral Neuropathy  This is a chronic problem. The current episode started more than 1 year ago. The problem occurs constantly. The problem has been unchanged. Associated symptoms include arthralgias, myalgias (fibromyalgia), numbness, a sore throat and weakness. Pertinent negatives include no abdominal pain, chest pain, chills, congestion, coughing, diaphoresis, fatigue, fever, headaches, joint swelling, nausea, neck pain or vomiting. Nothing (cold weather) aggravates the symptoms. She has tried NSAIDs and oral narcotics (Norco 5/325, Lyrica 200 mg " TID, Flexeril at HS, Elavil 150 mg HS) for the symptoms. The treatment provided moderate relief.   Back Pain  This is a chronic problem. The current episode started more than 1 year ago. The problem occurs constantly. The problem is unchanged. The pain is present in the lumbar spine and thoracic spine. The pain is at a severity of 2/10. The pain is moderate. Associated symptoms include numbness and weakness. Pertinent negatives include no abdominal pain, bladder incontinence, bowel incontinence, chest pain, dysuria, fever or headaches. She has tried analgesics and muscle relaxant (SI injection, norco, flexeril) for the symptoms. The treatment provided moderate relief.      PEG Assessment   What number best describes your pain on average in the past week?5  What number best describes how, during the past week, pain has interfered with your enjoyment of life?4  What number best describes how, during the past week, pain has interfered with your general activity?  4    The following portions of the patient's history were reviewed and updated as appropriate: allergies, current medications, past family history, past medical history, past social history, past surgical history and problem list.    Review of Systems   Constitutional: Negative for chills, diaphoresis, fatigue and fever.   HENT: Positive for sore throat. Negative for congestion.    Respiratory: Negative for cough and shortness of breath.    Cardiovascular: Negative for chest pain.   Gastrointestinal: Positive for constipation and diarrhea. Negative for abdominal pain, bowel incontinence, nausea and vomiting.   Genitourinary: Negative for bladder incontinence, difficulty urinating, dyspareunia and dysuria.   Musculoskeletal: Positive for arthralgias, back pain and myalgias (fibromyalgia). Negative for joint swelling and neck pain.   Neurological: Positive for weakness and numbness. Negative for dizziness, light-headedness and headaches.  "  Psychiatric/Behavioral: Positive for sleep disturbance. Negative for confusion, hallucinations, self-injury and suicidal ideas. The patient is not nervous/anxious.    All other systems reviewed and are negative.    --  The aforementioned information the Chief Complaint section and above subjective data including any HPI data, and also the Review of Systems data, has been personally reviewed and affirmed.  --     Vitals:    01/22/21 0847   BP: 136/93   Pulse: 100   Resp: 18   Temp: 97.4 °F (36.3 °C)   SpO2: 98%   Weight: 65.3 kg (144 lb)   Height: 172.7 cm (68\")   PainSc:   2   PainLoc: Back     Objective   Physical Exam  Vitals signs and nursing note reviewed.   Constitutional:       Appearance: Normal appearance. She is well-developed.   Eyes:      General: Lids are normal.   Neck:      Musculoskeletal: Normal range of motion.   Cardiovascular:      Rate and Rhythm: Normal rate.   Pulmonary:      Effort: Pulmonary effort is normal.   Musculoskeletal:      Lumbar back: She exhibits decreased range of motion and tenderness.      Left foot: Tenderness and swelling present.   Neurological:      Mental Status: She is alert and oriented to person, place, and time.   Psychiatric:         Speech: Speech normal.         Behavior: Behavior normal.         Judgment: Judgment normal.       Assessment/Plan   Diagnoses and all orders for this visit:    1. SI (sacroiliac) joint dysfunction (Primary)    2. Encounter for long-term (current) use of medications    3. Chronic pain syndrome    4. Disorder of peripheral nervous system    Other orders  -     HYDROcodone-acetaminophen (NORCO) 5-325 MG per tablet; Take 1 tablet by mouth Every 6 (Six) Hours As Needed for Severe Pain . DNF 1/29/2021  Dispense: 120 tablet; Refill: 0      --- The urine drug screen confirmation from 11/23/2020 has been reviewed and the result is appropriate based on patient history and POLLY report  --- The patient signed an updated copy of the controlled " substance agreement on 1/22/2021  --- Refill [place] 5/325. DNF 1/29/2021 applied. Patient appears stable with current regimen. No adverse effects. Regarding continuation of opioids, there is no evidence of aberrant behavior or any red flags.  The patient continues with appropriate response to opioid therapy. ADL's remain intact by self.   --- Refill of Compounded pain cream will be sent to [facility].   --- May repeat Bilateral SI joint injections every 3 months PRN.   --- Follow-up 2 months or sooner if needed     POLLY REPORT  As part of the patient's treatment plan, I am prescribing controlled substances. The patient has been made aware of appropriate use of such medications, including potential risk of somnolence, limited ability to drive and/or work safely, and the potential for dependence or overdose. It has also bee made clear that these medications are for use by this patient only, without concomitant use of alcohol or other substances unless prescribed.     Patient has completed prescribing agreement detailing terms of continued prescribing of controlled substances, including monitoring POLLY reports, urine drug screening, and pill counts if necessary. The patient is aware that inappropriate use will results in cessation of prescribing such medications.    POLLY report has been reviewed and scanned into the patient's chart.    As the clinician, I personally reviewed the POLLY from 1/22/2021 while the patient was in the office today.    History and physical exam exhibit continued safe and appropriate use of controlled substances.    EMR Dragon/Transcription disclaimer:   Much of this encounter note is an electronic transcription/translation of spoken language to printed text. The electronic translation of spoken language may permit erroneous, or at times, nonsensical words or phrases to be inadvertently transcribed; Although I have reviewed the note for such errors, some may still exist.

## 2021-02-23 RX ORDER — HYDROCODONE BITARTRATE AND ACETAMINOPHEN 5; 325 MG/1; MG/1
1 TABLET ORAL EVERY 6 HOURS PRN
Qty: 120 TABLET | Refills: 0 | Status: SHIPPED | OUTPATIENT
Start: 2021-02-23 | End: 2021-03-23 | Stop reason: SDUPTHER

## 2021-03-23 ENCOUNTER — PREP FOR SURGERY (OUTPATIENT)
Dept: SURGERY | Facility: SURGERY CENTER | Age: 51
End: 2021-03-23

## 2021-03-23 ENCOUNTER — OFFICE VISIT (OUTPATIENT)
Dept: PAIN MEDICINE | Facility: CLINIC | Age: 51
End: 2021-03-23

## 2021-03-23 VITALS
OXYGEN SATURATION: 97 % | RESPIRATION RATE: 18 BRPM | DIASTOLIC BLOOD PRESSURE: 95 MMHG | HEART RATE: 89 BPM | BODY MASS INDEX: 22.58 KG/M2 | SYSTOLIC BLOOD PRESSURE: 153 MMHG | HEIGHT: 68 IN | TEMPERATURE: 97.5 F | WEIGHT: 149 LBS

## 2021-03-23 DIAGNOSIS — M53.3 SACROILIAC JOINT DYSFUNCTION OF BOTH SIDES: ICD-10-CM

## 2021-03-23 DIAGNOSIS — G64 DISORDER OF PERIPHERAL NERVOUS SYSTEM: ICD-10-CM

## 2021-03-23 DIAGNOSIS — G89.4 CHRONIC PAIN SYNDROME: ICD-10-CM

## 2021-03-23 DIAGNOSIS — Z79.899 ENCOUNTER FOR LONG-TERM (CURRENT) USE OF MEDICATIONS: ICD-10-CM

## 2021-03-23 DIAGNOSIS — M53.3 SACROILIAC JOINT DYSFUNCTION OF BOTH SIDES: Primary | ICD-10-CM

## 2021-03-23 DIAGNOSIS — M53.3 SI (SACROILIAC) JOINT DYSFUNCTION: Primary | ICD-10-CM

## 2021-03-23 PROCEDURE — 99214 OFFICE O/P EST MOD 30 MIN: CPT | Performed by: NURSE PRACTITIONER

## 2021-03-23 RX ORDER — SODIUM CHLORIDE 0.9 % (FLUSH) 0.9 %
10 SYRINGE (ML) INJECTION AS NEEDED
Status: CANCELLED | OUTPATIENT
Start: 2021-03-23

## 2021-03-23 RX ORDER — HYDROCODONE BITARTRATE AND ACETAMINOPHEN 5; 325 MG/1; MG/1
1 TABLET ORAL EVERY 6 HOURS PRN
Qty: 120 TABLET | Refills: 0 | Status: SHIPPED | OUTPATIENT
Start: 2021-03-23 | End: 2021-04-19 | Stop reason: SDUPTHER

## 2021-03-23 RX ORDER — CYCLOBENZAPRINE HCL 10 MG
10 TABLET ORAL 3 TIMES DAILY PRN
Qty: 90 TABLET | Refills: 3 | Status: SHIPPED | OUTPATIENT
Start: 2021-03-23 | End: 2021-07-21 | Stop reason: SDUPTHER

## 2021-03-23 RX ORDER — SODIUM CHLORIDE 0.9 % (FLUSH) 0.9 %
10 SYRINGE (ML) INJECTION EVERY 12 HOURS SCHEDULED
Status: CANCELLED | OUTPATIENT
Start: 2021-03-23

## 2021-03-23 NOTE — PROGRESS NOTES
"CHIEF COMPLAINT  Back pain is worsened since last visit    Subjective   Ana Maria Story is a 50 y.o. female  who presents for follow-up.  She has a history of back pain. Today her pain is 5/10VAS in severity. She states her pain has worsened over the last 3-4 weeks.  She feels the benefit from her Bilateral SI injections performed in December 2020 began to wane approximately a month ago.  She describes her pain as continuous aching and burning pain in her low back.  She also has sharp pain in her legs and feet.      She is currently maintained on Hydrocodone 5/325 4/day, Lyrica 200 mg 2-3/day, Flexeril 10 mg 2/day PRN, and compounded pain cream.  This medication regimen decreases her pain by \"atleast 50%\". ADLs by self. She denies any changes in bowel or bladder since her last office visit.  She denies any side effects including somnolence or constipation.     She has recently established care with a new PCP and she is being referred to a podiatrist for her bilateral foot pain.  She has also scheduled her DEXA and mammogram.  She states she is also being referred for a colonoscopy.        Patient remained masked during entire encounter. No cough present. I donned a mask and eye protection throughout entire visit. Prior to donning mask and eye protection, hand hygiene was performed, as well as when it was doffed.  I was closer than 6 feet, but not for an extended period of time. No obvious exposure to any bodily fluids.    Peripheral Neuropathy  This is a chronic problem. The current episode started more than 1 year ago. The problem occurs constantly. The problem has been gradually worsening. Associated symptoms include arthralgias, myalgias (fibromyalgia) and a sore throat. Pertinent negatives include no abdominal pain, chest pain, chills, congestion, coughing, diaphoresis, fatigue, fever, headaches, joint swelling, nausea, neck pain, numbness, vomiting or weakness. Nothing (cold weather) aggravates the symptoms. " She has tried NSAIDs and oral narcotics (Norco 5/325, Lyrica 200 mg TID, Flexeril at HS, Elavil 150 mg HS) for the symptoms. The treatment provided moderate relief.   Back Pain  This is a chronic problem. The current episode started more than 1 year ago. The problem occurs constantly. The problem is unchanged. The pain is present in the lumbar spine and thoracic spine. The pain is at a severity of 5/10. The pain is moderate. Pertinent negatives include no abdominal pain, bladder incontinence, bowel incontinence, chest pain, dysuria, fever, headaches, numbness or weakness. She has tried analgesics and muscle relaxant (SI injection, norco, flexeril) for the symptoms. The treatment provided moderate relief.      PEG Assessment   What number best describes your pain on average in the past week?4  What number best describes how, during the past week, pain has interfered with your enjoyment of life?4  What number best describes how, during the past week, pain has interfered with your general activity?  3    The following portions of the patient's history were reviewed and updated as appropriate: allergies, current medications, past family history, past medical history, past social history, past surgical history and problem list.    Review of Systems   Constitutional: Negative for chills, diaphoresis, fatigue and fever.   HENT: Positive for sore throat. Negative for congestion.    Respiratory: Negative for cough and shortness of breath.    Cardiovascular: Negative for chest pain.   Gastrointestinal: Negative for abdominal pain, bowel incontinence, constipation, diarrhea, nausea and vomiting.   Genitourinary: Negative for bladder incontinence, difficulty urinating, dyspareunia and dysuria.   Musculoskeletal: Positive for arthralgias, back pain and myalgias (fibromyalgia). Negative for joint swelling and neck pain.   Neurological: Negative for dizziness, weakness, light-headedness, numbness and headaches.  "  Psychiatric/Behavioral: Negative for confusion, hallucinations, self-injury, sleep disturbance and suicidal ideas. The patient is not nervous/anxious.    All other systems reviewed and are negative.    --  The aforementioned information the Chief Complaint section and above subjective data including any HPI data, and also the Review of Systems data, has been personally reviewed and affirmed.  --    Vitals:    03/23/21 0922   BP: 153/95   Pulse: 89   Resp: 18   Temp: 97.5 °F (36.4 °C)   SpO2: 97%   Weight: 67.6 kg (149 lb)   Height: 172.7 cm (68\")   PainSc:   5   PainLoc: Back     Objective   Physical Exam  Vitals and nursing note reviewed.   Constitutional:       Appearance: Normal appearance. She is well-developed.   Eyes:      General: Lids are normal.   Cardiovascular:      Rate and Rhythm: Normal rate.   Pulmonary:      Effort: Pulmonary effort is normal.   Musculoskeletal:      Cervical back: Normal range of motion.      Lumbar back: Tenderness present.      Comments: POS Ranjit SI Compression  POS Ranjit Varinder  POS Ranjit Thigh Thrust   Neurological:      Mental Status: She is alert and oriented to person, place, and time.      Motor: No weakness.      Gait: Gait normal.      Deep Tendon Reflexes:      Reflex Scores:       Patellar reflexes are 1+ on the right side and 1+ on the left side.       Achilles reflexes are 1+ on the right side and 1+ on the left side.  Psychiatric:         Attention and Perception: Attention normal.         Mood and Affect: Mood normal.         Speech: Speech normal.         Behavior: Behavior normal.         Judgment: Judgment normal.       Assessment/Plan   Diagnoses and all orders for this visit:    1. SI (sacroiliac) joint dysfunction (Primary)    2. Sacroiliac joint dysfunction of both sides    3. Encounter for long-term (current) use of medications    4. Chronic pain syndrome    5. Disorder of peripheral nervous system    --- Bilateral Sacroiliac joint injections  Reviewed the " procedure at length with the patient.  Included in the review was expectations, complications, risk and benefits.The procedure was described in detail and the risks, benefits and alternatives were discussed with the patient (including but not limited to: bleeding, infection, nerve damage, worsening of pain, no pain relief, inability to perform injection, paralysis, seizures, coma, and death) who agreed to proceed.  Discussed the potential for sedation if warranted/wanted.  The procedure will plan to be performed at [facility] with fluoroscopic guidance(unless ultrasound is indicated) and could potentially have steroids and contrast dye used. Questions were answered and in a way the patient could understand.  Patient verbalized understanding and wishes to proceed.  This intervention will be ordered.  Discussed with patient that all procedures are part of a multimodal plan of care and include either formal PT or a home exercise program.  Patient has no evidence of coagulopathy or current infection.  --- The urine drug screen confirmation from 11/23/2020 has been reviewed and the result is appropriate based on patient history and POLLY report  --- Refill Ambia 5/325. DNF 3/27/2021 applied. Patient appears stable with current regimen. No adverse effects. Regarding continuation of opioids, there is no evidence of aberrant behavior or any red flags.  The patient continues with appropriate response to opioid therapy. ADL's remain intact by self.   --- Refill of Compounded pain cream will be sent to [facility].   --- CSA updated 1/22/2021  --- Follow-up 2 months or sooner if needed     POLLY REPORT  As part of the patient's treatment plan, I am prescribing controlled substances. The patient has been made aware of appropriate use of such medications, including potential risk of somnolence, limited ability to drive and/or work safely, and the potential for dependence or overdose. It has  also bee made clear that these medications are for use by this patient only, without concomitant use of alcohol or other substances unless prescribed.     Patient has completed prescribing agreement detailing terms of continued prescribing of controlled substances, including monitoring POLLY reports, urine drug screening, and pill counts if necessary. The patient is aware that inappropriate use will results in cessation of prescribing such medications.    POLLY report has been reviewed and scanned into the patient's chart.    As the clinician, I personally reviewed the POLLY from 3/23/2021 while the patient was in the office today.    History and physical exam exhibit continued safe and appropriate use of controlled substances.    EMR Dragon/Transcription disclaimer:   Much of this encounter note is an electronic transcription/translation of spoken language to printed text. The electronic translation of spoken language may permit erroneous, or at times, nonsensical words or phrases to be inadvertently transcribed; Although I have reviewed the note for such errors, some may still exist.

## 2021-03-31 ENCOUNTER — TRANSCRIBE ORDERS (OUTPATIENT)
Dept: SURGERY | Facility: SURGERY CENTER | Age: 51
End: 2021-03-31

## 2021-03-31 ENCOUNTER — TRANSCRIBE ORDERS (OUTPATIENT)
Dept: LAB | Facility: SURGERY CENTER | Age: 51
End: 2021-03-31

## 2021-03-31 DIAGNOSIS — M53.3 SACROILIAC JOINT DYSFUNCTION OF BOTH SIDES: Primary | ICD-10-CM

## 2021-03-31 DIAGNOSIS — Z01.818 OTHER SPECIFIED PRE-OPERATIVE EXAMINATION: Primary | ICD-10-CM

## 2021-04-05 RX ORDER — ERGOCALCIFEROL 1.25 MG/1
50000 CAPSULE ORAL
COMMUNITY
End: 2021-11-04 | Stop reason: HOSPADM

## 2021-04-05 NOTE — SIGNIFICANT NOTE
Patient educated on the following :      -Your required COVID Test is Scheduled on       4/6   Between the Hours of 9515-7627  -You will only be notified if your COVID test Result is POSITIVE  -The importance of reducing your number of contacts by self quarantining after you COVID test until the date of your PROCEDURE  -You will need to have someone drive you home after your PROCEDURE and remain with you for 24 hours after the PROCEDURE  - The date of your PROCEDURE, your ride is welcome to either remain in our parking lot or within 10-15 minutes of [employer]  -You will need to arrive at        1245   on        4/8   for your PROCEDURE  -Please contact [employer] [facility] at: [phone] with any questions and/or concerns

## 2021-04-06 ENCOUNTER — LAB (OUTPATIENT)
Dept: LAB | Facility: SURGERY CENTER | Age: 51
End: 2021-04-06

## 2021-04-06 DIAGNOSIS — Z01.818 OTHER SPECIFIED PRE-OPERATIVE EXAMINATION: ICD-10-CM

## 2021-04-06 LAB — SARS-COV-2 ORF1AB RESP QL NAA+PROBE: NOT DETECTED

## 2021-04-06 PROCEDURE — U0004 COV-19 TEST NON-CDC HGH THRU: HCPCS | Performed by: SURGERY

## 2021-04-06 PROCEDURE — C9803 HOPD COVID-19 SPEC COLLECT: HCPCS

## 2021-04-08 ENCOUNTER — HOSPITAL ENCOUNTER (OUTPATIENT)
Dept: GENERAL RADIOLOGY | Facility: SURGERY CENTER | Age: 51
Setting detail: HOSPITAL OUTPATIENT SURGERY
End: 2021-04-08

## 2021-04-08 ENCOUNTER — HOSPITAL ENCOUNTER (OUTPATIENT)
Facility: SURGERY CENTER | Age: 51
Setting detail: HOSPITAL OUTPATIENT SURGERY
Discharge: HOME | End: 2021-04-08
Attending: ANESTHESIOLOGY | Admitting: ANESTHESIOLOGY

## 2021-04-08 VITALS
HEART RATE: 100 BPM | WEIGHT: 145 LBS | SYSTOLIC BLOOD PRESSURE: 159 MMHG | RESPIRATION RATE: 16 BRPM | TEMPERATURE: 98.1 F | OXYGEN SATURATION: 97 % | HEIGHT: 68 IN | BODY MASS INDEX: 21.98 KG/M2 | DIASTOLIC BLOOD PRESSURE: 110 MMHG

## 2021-04-08 DIAGNOSIS — M53.3 SACROILIAC JOINT DYSFUNCTION OF BOTH SIDES: ICD-10-CM

## 2021-04-08 PROCEDURE — 3E0U3BZ INTRODUCTION OF ANESTHETIC AGENT INTO JOINTS, PERCUTANEOUS APPROACH: ICD-10-PCS | Performed by: ANESTHESIOLOGY

## 2021-04-08 PROCEDURE — 27096 INJECT SACROILIAC JOINT: CPT | Performed by: ANESTHESIOLOGY

## 2021-04-08 PROCEDURE — 3E0U33Z INTRODUCTION OF ANTI-INFLAMMATORY INTO JOINTS, PERCUTANEOUS APPROACH: ICD-10-PCS | Performed by: ANESTHESIOLOGY

## 2021-04-08 PROCEDURE — 25010000002 METHYLPREDNISOLONE PER 80 MG: Performed by: ANESTHESIOLOGY

## 2021-04-08 PROCEDURE — G0260 INJ FOR SACROILIAC JT ANESTH: HCPCS | Performed by: ANESTHESIOLOGY

## 2021-04-08 PROCEDURE — 0 IOHEXOL 300 MG/ML SOLUTION 10 ML VIAL: Performed by: ANESTHESIOLOGY

## 2021-04-08 RX ORDER — LANOLIN ALCOHOL/MO/W.PET/CERES
1000 CREAM (GRAM) TOPICAL WEEKLY
COMMUNITY
End: 2021-09-30

## 2021-04-08 RX ORDER — SODIUM CHLORIDE 0.9 % (FLUSH) 0.9 %
10 SYRINGE (ML) INJECTION EVERY 12 HOURS SCHEDULED
Status: DISCONTINUED | OUTPATIENT
Start: 2021-04-08 | End: 2021-04-08 | Stop reason: HOSPADM

## 2021-04-08 RX ORDER — SODIUM CHLORIDE 0.9 % (FLUSH) 0.9 %
10 SYRINGE (ML) INJECTION AS NEEDED
Status: DISCONTINUED | OUTPATIENT
Start: 2021-04-08 | End: 2021-04-08 | Stop reason: HOSPADM

## 2021-04-08 NOTE — DISCHARGE INSTRUCTIONS
[facility] - Post-procedure Instructions          --  While there are no absolute restrictions, it is recommended that you do not perform strenuous activity today. In the morning, you may resume your level of activity as before your block.    --  If you have a band-aid at your injection site, please remove it later today. Observe the area for any redness, swelling, pus-like drainage, or a temperature over 101°. If any of these symptoms occur, please call your doctor at [phone]. If after office hours, leave a message and the on-call provider will return your call.    --  Ice may be applied to your injection site. It is recommended you avoid direct heat (heating pad; hot tub) for 1-2 days.    --  Call [facility] at [phone] if you experience persistent headache, persistent bleeding from the injection site, or severe pain not relieved by heat or oral medication.    --  Do not make important decisions today.    --  Due to the effects of the block and/or the I.V. Sedation, DO NOT drive or operate hazardous machinery for 12 hours.    --  Do not drink alcohol for 12 hours.    -- You may return to work tomorrow, or as directed by your referring doctor.    --  Occasionally you may notice a slight increase in your pain after the procedure. This should start to improve within the next 24-48 hours. Radiofrequency ablation procedure pain may last 3-4 weeks.    --  It may take as long as 3-4 days before you notice a gradual improvement in your pain and/or other symptoms.    -- You may continue to take your prescribed pain medication as needed.    --  Some normal possible side effects of steroid use could include fluid retention, increased blood sugar, dull headache, increased sweating, increased appetite, mood swings and flushing.    --  Diabetics are recommended to watch their blood glucose level closely for 24-48 hours after the injection.    --  Must stay in PACU for 20 min upon arrival and  prove no leg weakness before being discharged.    --  IN THE EVENT OF A LIFE THREATENING EMERGENCY, (CHEST PAIN, BREATHING DIFFICULTIES, PARALYSIS…) YOU SHOULD GO TO YOUR NEAREST EMERGENCY ROOM.    --  You should be contacted by our office within 2-3 days to schedule follow up or next appointment date.  If not contacted within 7 days, please call the office at [phone]      ---    ----------  Education about Sacroiliac joint injections:  This Sacroiliac joint injection (blockade) we have suggested is intended for diagnostic purposes, with the intent of offering the patient Radiofrequency thermal rhizotomy (RF) of the sensory branches to the joint if the block is diagnostically effective.  The diagnostic blockade is necessary to determine the likelihood that RF therapy could be efficacious in providing long term relief to the patient.    In this procedure, the sacroiliac joint is aligned with imaging, and under image guidance a needle is placed with the needle tip into the joint.  The needle position is confirmed to be appropriately in the joint before injection of medication into the joint.  When xray fluoroscopy is used, contrast dye is used to confirm a proper arthrogram (i.e., outline of the joint).  When ultrasound is used, IV fluid (normal saline) is injected to see the flow of the fluid into the joint.  Once confirmed, then the medication can be injected into the joint.  Oftentimes this medication is a combination of local anesthetics (for diagnostic purposes) and also a steroid (to decrease irritation & inflammation in the joint, also known as sacroilitis).      Medically, a successful RF procedure should provide a patient with 50% pain relief or more for at least 6 months.  Clinical experience suggests that successful patients receive relief more in the range of 12 months on average.  We also discussed that many patients receive therapeutic success from the intraarticular joint injection, and may not  require RF ablation.  If a patient receives more than 8 weeks of relief from joint injection, then occasional repeat joint blocks for therapeutic purposes is a very reasonable alternative therapy.  This course of therapy is consistent with our LCDs according to our CMS  in the area, and therefore other insurance providers should follow accordingly.  We will monitor our patients to screen for these therapeutic responders and will offer RF therapy only when necessary.      We discussed that joint injections & also RF procedures are not without risks.  Best practices regarding anticoagulant use & neuraxial procedures will be respected.  Oftentimes a patient on an anticoagulant can be offered a joint injection safely, but again this is not risk-free, and such patients give consent with regards to this increased bleeding risk, which could cause problems including but not limited to worsening of pain, nerve damage, or muscle damage.  Patients that are ill or otherwise may be at risk for sepsis will not have their spines accessed by neuraxial injections of any type.  This patient will not be offered these therapies if there is an increased risk.   We discussed that there is a risk of postprocedural pain and also a risk of worsening of clinical picture with these procedures as with any neuraxial procedure.    ----------

## 2021-04-12 ENCOUNTER — CONVERSION ENCOUNTER (OUTPATIENT)
Dept: OTHER | Facility: HOSPITAL | Age: 51
End: 2021-04-12

## 2021-04-12 ENCOUNTER — OFFICE VISIT CONVERTED (OUTPATIENT)
Dept: PODIATRY | Facility: CLINIC | Age: 51
End: 2021-04-12
Attending: PODIATRIST

## 2021-04-19 RX ORDER — HYDROCODONE BITARTRATE AND ACETAMINOPHEN 5; 325 MG/1; MG/1
1 TABLET ORAL EVERY 6 HOURS PRN
Qty: 120 TABLET | Refills: 0 | Status: SHIPPED | OUTPATIENT
Start: 2021-04-19 | End: 2021-05-24 | Stop reason: SDUPTHER

## 2021-05-11 NOTE — H&P
History and Physical      Patient Name: Ana Maria Story   Patient ID: [id number]   Sex: Female   YOB: 1970    Primary Care Provider: Karmen RIVERA   Referring Provider: Karmen RIVERA    Visit Date: April 12, 2021    Provider: Jorge L Rapp DPM   Location: [facility] [place] [place] [facility]   Location Address: [address] [place] [address]  [id number]   Location Phone: [phone]          Chief Complaint  · Left Foot Pain      History Of Present Illness  Ana Maria Story is a 50 year old /White female who presents to the [facility] today new patient referred from Karmen RIVERA.      New, Established, New Problem:  New  Location: Left first metatarsal phalangeal joint  Duration: Greater than 1 year  Onset: Insidious  Nature: Sore, achy, sharp  Stable, worsening, improving: Slowly worsening  Aggravating factors:  Patient relates pain is aggravated by shoe gear and ambulation.    Previous Treatment: None    Patient denies any fevers, chills, nausea, vomiting, shortness of breathe, nor any other constitutional signs nor symptoms.    Patient relates she is medically disabled due to chronic pain with a B12 deficiency.    Patient states she smokes half pack a day.      New, Established, New Problem: Second problem  Location: Left medial plantar arch  Duration: Greater than 1 year  Onset: Insidious  Nature: Sore, sharp  Stable, worsening, improving: Worsening  Aggravating factors:  Patient relates pain is aggravated by shoe gear and ambulation.    Previous Treatment: None       Past Medical History  Allergy, Unspecified; Arthritis; Arthropathy, unspecified; Bilateral Carpal Tunnel Syndrome; Chronic pain syndrome; Cubital tunnel syndrome, unspecified laterality; Foot pain, left; Foot pain, left; GERD; Hyperlipidemia; Hypertriglyceridemia; Hypothyroidism, Acquired; IBS (irritable bowel syndrome); Impaired fasting glucose; Iron deficiency; Neuropathy; Tobacco Abuse,  "Chronic; Urinary incontinence; Vitamin B12 Deficiency; Vitamin D deficiency         Past Surgical History  Arm Surgery; Cholecystectomy; Hand surgery; Hysterectomy; Tubal ligation         Medication List  cyclobenzaprine 10 mg oral tablet; hydrocodone-acetaminophen 5-325 mg oral tablet; Lyrica 200 mg oral capsule; omeprazole 20 mg oral capsule,delayed release(DR/EC); Vitamin D2 1,250 mcg (50,000 unit) oral capsule         Allergy List  NO KNOWN DRUG ALLERGIES; Adhesives; Latex       Allergies Reconciled  Family Medical History  Diabetes Mellitus, Type II; FH: breast cancer; FH: heart disease; FH: hypertension         Social History  Alcohol (Never); Tobacco (Current every day)         Immunizations  Name Date Admin   Hepatitis A 05/07/2018   Influenza 08/14/2017         Review of Systems  · Constitutional  o Denies  o : fatigue, night sweats  · Eyes  o Denies  o : double vision, blurred vision  · HENT  o Denies  o : vertigo, recent head injury  · Cardiovascular  o Denies  o : chest pain, irregular heart beats  · Respiratory  o Denies  o : shortness of breath, productive cough  · Gastrointestinal  o Denies  o : nausea, vomiting  · Genitourinary  o Denies  o : dysuria, urinary retention  · Integument  o Denies  o : hair growth change, new skin lesions  · Neurologic  o Admits  o : tingling or numbness  o Denies  o : altered mental status, seizures  · Musculoskeletal  o * See HPI  · Endocrine  o Denies  o : cold intolerance, heat intolerance  · Heme-Lymph  o Denies  o : petechiae, lymph node enlargement or tenderness  · Allergic-Immunologic  o Denies  o : frequent illnesses      Vitals  Date Time BP Position Site L\R Cuff Size HR RR TEMP (F) WT  HT  BMI kg/m2 BSA m2 O2 Sat FR L/min FiO2 HC       04/12/2021 01:02 /97 Sitting    82 - R  97.5 145lbs 0oz 5'  8\" 22.05 1.78 99 %      04/12/2021 01:02 /89 Sitting                       Physical Examination  · Constitutional  o Appearance  o : well developed, " well-nourished, no obvious deformities present  · Cardiovascular  o Peripheral Vascular System  o :   § Pedal Pulses  § : pulses 2 + and symmetrical  § Extremities  § : no edema in lower extremities  · Musculoskeletal  o General  o :   § General Musculoskeletal  § : Lower extremity muscle and strength and range of motion is equal and symmetrical bilaterally. The knees are noted to be normal in alignment. Left medial deviation of the first metatarsal with associated lateral deviation of the hallux at the metatarsal phalangeal joint. First MPJ is painful with range of motion and palpation. Left plantar medial band of the plantar fascial band shows a large plantar fibroma formation. This area is tender to palpation. No signs of edema, erythema, lymphangitis, nor signs of infection.   · Skin and Subcutaneous Tissue  o General Inspection  o : Skin is noted to have normal texture and turgor, with no excrescences noted.   o Digits and Nails  o : The toenails are noted to be without disese.  · Neurologic  o Sensation  o : Sharp/dull sensation is absent bilaterally. Monofilament sensation examination of the left foot is absent. Monofilament sensation examination of the right foot is absent.      IN-OFFICE IMAGING:  3 view, AP, [place], Lateral, left foot.  Medial deviation of the first metatarsal with associated lateral deviation of the hallux at the metatarsal phalangeal joint.  The 1st-2nd inner metatarsal angle is 11 degrees.  The DASA is 12 degrees.           Assessment  · Foot pain, left     729.5/M79.672  · Plantar fascial fibromatosis of left foot     728.71/M72.2  · Hallux abducto valgus, left     735.0/M20.12      Plan  · Orders  o Foot (Left) 3 or more views X-Ray [facility] Preferred View ([id number]) - - 04/12/2021  o PODIATRY CONSULTATION (PODIA) - - 04/12/2021  · Medications  o Medications have been Reconciled  o Transition of Care or Provider Policy  · Instructions  o Overview: This patient has a plantar fascial  fibroma.  o Discuss Findings: I have discussed the findings of this evaluation with the patient. The discussion included a complete verbal explanation of any changes in the examination results, diagnosis, and the current treatment plan. A schedule for future care needs was explained. If any questions should arise after returning home, I have encouraged the patient to feel free to contact Dr. Rapp. The patient states understanding and agreement with this plan.  o Monitor For Problems: Pt to monitor for problems and to contact Dr. Rapp for follow-up should such signs occur. Patient states understanding and agreement with this plan.  o The need for a referral to another physician was discussed with the patient. They state understanding and agreement with this plan. Dr. Héctor Rodriguez.  o Electronically Identified Patient Education Materials Provided Electronically  · Disposition  o Call or Return if symptoms worsen or persist.  · Referrals  o ID: [id number] Date: 03/22/2021 Type: Inbound  Specialty: Podiatry            Electronically Signed by: Jorge L Rapp DPM -Author on April 12, 2021 01:33:10 PM

## 2021-05-14 VITALS
TEMPERATURE: 97.5 F | WEIGHT: 145 LBS | BODY MASS INDEX: 21.98 KG/M2 | SYSTOLIC BLOOD PRESSURE: 173 MMHG | HEIGHT: 68 IN | OXYGEN SATURATION: 99 % | HEART RATE: 82 BPM | DIASTOLIC BLOOD PRESSURE: 97 MMHG

## 2021-05-18 ENCOUNTER — OFFICE VISIT CONVERTED (OUTPATIENT)
Dept: GASTROENTEROLOGY | Facility: CLINIC | Age: 51
End: 2021-05-18
Attending: NURSE PRACTITIONER

## 2021-05-22 ENCOUNTER — TRANSCRIBE ORDERS (OUTPATIENT)
Dept: ADMINISTRATIVE | Facility: HOSPITAL | Age: 51
End: 2021-05-22

## 2021-05-22 ENCOUNTER — TRANSCRIBE ORDERS (OUTPATIENT)
Dept: LAB | Facility: HOSPITAL | Age: 51
End: 2021-05-22

## 2021-05-22 DIAGNOSIS — Z12.31 SCREENING MAMMOGRAM FOR HIGH-RISK PATIENT: Primary | ICD-10-CM

## 2021-05-22 DIAGNOSIS — Z78.0 POST-MENOPAUSAL: Primary | ICD-10-CM

## 2021-05-22 DIAGNOSIS — Z01.818 PRE-OP TESTING: Primary | ICD-10-CM

## 2021-05-24 ENCOUNTER — OFFICE VISIT (OUTPATIENT)
Dept: PAIN MEDICINE | Facility: CLINIC | Age: 51
End: 2021-05-24

## 2021-05-24 VITALS
SYSTOLIC BLOOD PRESSURE: 149 MMHG | DIASTOLIC BLOOD PRESSURE: 93 MMHG | BODY MASS INDEX: 22.58 KG/M2 | WEIGHT: 149 LBS | OXYGEN SATURATION: 100 % | RESPIRATION RATE: 20 BRPM | HEART RATE: 96 BPM | TEMPERATURE: 96.8 F | HEIGHT: 68 IN

## 2021-05-24 DIAGNOSIS — M53.3 SI (SACROILIAC) JOINT DYSFUNCTION: Primary | ICD-10-CM

## 2021-05-24 DIAGNOSIS — G64 DISORDER OF PERIPHERAL NERVOUS SYSTEM: ICD-10-CM

## 2021-05-24 DIAGNOSIS — G89.4 CHRONIC PAIN SYNDROME: ICD-10-CM

## 2021-05-24 DIAGNOSIS — E53.8 B12 NEUROPATHY (HCC): ICD-10-CM

## 2021-05-24 DIAGNOSIS — M53.3 SACROILIAC JOINT DYSFUNCTION OF BOTH SIDES: ICD-10-CM

## 2021-05-24 DIAGNOSIS — Z79.899 ENCOUNTER FOR LONG-TERM (CURRENT) USE OF MEDICATIONS: ICD-10-CM

## 2021-05-24 DIAGNOSIS — G63 B12 NEUROPATHY (HCC): ICD-10-CM

## 2021-05-24 PROCEDURE — 99214 OFFICE O/P EST MOD 30 MIN: CPT | Performed by: NURSE PRACTITIONER

## 2021-05-24 RX ORDER — PREGABALIN 200 MG/1
200 CAPSULE ORAL 3 TIMES DAILY
Qty: 90 CAPSULE | Refills: 5 | Status: SHIPPED | OUTPATIENT
Start: 2021-05-24 | End: 2021-11-04 | Stop reason: HOSPADM

## 2021-05-24 RX ORDER — PREGABALIN 200 MG/1
200 CAPSULE ORAL 3 TIMES DAILY
COMMUNITY
End: 2021-05-24 | Stop reason: SDUPTHER

## 2021-05-24 RX ORDER — CYANOCOBALAMIN 1000 UG/ML
INJECTION, SOLUTION INTRAMUSCULAR; SUBCUTANEOUS
Status: ON HOLD | COMMUNITY
Start: 2021-05-20 | End: 2021-06-30

## 2021-05-24 RX ORDER — OMEPRAZOLE 40 MG/1
40 CAPSULE, DELAYED RELEASE ORAL DAILY
Status: ON HOLD | COMMUNITY
End: 2021-06-30

## 2021-05-24 RX ORDER — POLYETHYLENE GLYCOL 3350, SODIUM SULFATE ANHYDROUS, SODIUM BICARBONATE, SODIUM CHLORIDE, POTASSIUM CHLORIDE 236; 22.74; 6.74; 5.86; 2.97 G/4L; G/4L; G/4L; G/4L; G/4L
POWDER, FOR SOLUTION ORAL
COMMUNITY
Start: 2021-05-18 | End: 2021-09-30

## 2021-05-24 RX ORDER — CIPROFLOXACIN 500 MG/1
TABLET, FILM COATED ORAL
Status: ON HOLD | COMMUNITY
Start: 2021-03-24 | End: 2021-06-30

## 2021-05-24 RX ORDER — HYDROCODONE BITARTRATE AND ACETAMINOPHEN 5; 325 MG/1; MG/1
1 TABLET ORAL
COMMUNITY
End: 2021-05-24 | Stop reason: SDUPTHER

## 2021-05-24 RX ORDER — ERGOCALCIFEROL 1.25 MG/1
50000 CAPSULE ORAL
COMMUNITY
End: 2021-09-30

## 2021-05-24 RX ORDER — CYCLOBENZAPRINE HCL 10 MG
10 TABLET ORAL 3 TIMES DAILY PRN
COMMUNITY
End: 2021-07-21 | Stop reason: SDUPTHER

## 2021-05-24 RX ORDER — OXYCODONE HYDROCHLORIDE AND ACETAMINOPHEN 5; 325 MG/1; MG/1
TABLET ORAL
COMMUNITY
Start: 2021-04-28 | End: 2021-05-24

## 2021-05-24 RX ORDER — SYRINGE WITH NEEDLE, 1 ML 25GX5/8"
SYRINGE, EMPTY DISPOSABLE MISCELLANEOUS
Status: ON HOLD | COMMUNITY
Start: 2021-05-20 | End: 2021-06-30

## 2021-05-24 RX ORDER — HYDROCODONE BITARTRATE AND ACETAMINOPHEN 5; 325 MG/1; MG/1
1 TABLET ORAL EVERY 6 HOURS PRN
Qty: 120 TABLET | Refills: 0 | Status: SHIPPED | OUTPATIENT
Start: 2021-05-24 | End: 2021-06-22 | Stop reason: SDUPTHER

## 2021-05-24 NOTE — PROGRESS NOTES
"CHIEF COMPLAINT  F/u back pain and peripheral neuropathy.     Subjective   Ana Maria Story is a 50 y.o. female  who presents for follow-up.  She has a history of back pain. She completed Bilateral SI injection on 4/8/2021 performed by Dr. Plaza.  She reports 75% ONGOING relief from this procedure.    Today her pain is 6/10VAS in severity. She is currently maintained on Hydrocodone 5/325 3-4/day, Lyrica 200 mg 2-3/day, Flexeril 10 mg 2/day PRN, and compounded pain cream. This medication regimen decreases her pain by \"atleast 70%\". ADLs by self. She denies any side effects including somnolence or constipation.     Patient filled Percocet 5/325 #30 prescribed by Dr. Rodriguez following bunionectomy with double osteotomy of left foot and fibroma removal.  She states that she did NOT take this prescription, she does state that she did fill this but never needed this medication. She was under the impression that Dr. Rodriguez's office was going to contact our office regarding this.  Patient educated that she should have notified out office and that this is technically a breach of her CSA.  Educated that in the future she needs to contact our office prior to filling any opioid prescriptions from another provider. We will update her controlled substance agreement today. Her refill date will also be extended to 5/28/2021 due to the 2 day prescription of Percocet 5/325 on her Alberto.  Patient states understanding.     Patient remained masked during entire encounter. No cough present. I donned a mask and eye protection throughout entire visit. Prior to donning mask and eye protection, hand hygiene was performed, as well as when it was doffed.  I was closer than 6 feet, but not for an extended period of time. No obvious exposure to any bodily fluids.    Peripheral Neuropathy  This is a chronic problem. The current episode started more than 1 year ago. The problem occurs constantly. The problem has been gradually worsening. " Associated symptoms include arthralgias, joint swelling (left foot), myalgias (fibromyalgia), numbness (peripheral neuropathy) and a sore throat. Pertinent negatives include no abdominal pain, chest pain, chills, congestion, coughing, diaphoresis, fatigue, fever, headaches, nausea, neck pain, vomiting or weakness. Nothing (cold weather) aggravates the symptoms. She has tried NSAIDs and oral narcotics (Norco 5/325, Lyrica 200 mg TID, Flexeril at HS, Elavil 150 mg HS) for the symptoms. The treatment provided moderate relief.   Back Pain  This is a chronic problem. The current episode started more than 1 year ago. The problem occurs constantly. The problem is unchanged. The pain is present in the lumbar spine and thoracic spine. The pain is at a severity of 6/10. The pain is moderate. Associated symptoms include numbness (peripheral neuropathy). Pertinent negatives include no abdominal pain, bladder incontinence, bowel incontinence, chest pain, dysuria, fever, headaches or weakness. She has tried analgesics and muscle relaxant (SI injection, norco, flexeril) for the symptoms. The treatment provided moderate relief.      PEG Assessment   What number best describes your pain on average in the past week?6  What number best describes how, during the past week, pain has interfered with your enjoyment of life?6  What number best describes how, during the past week, pain has interfered with your general activity?  6    The following portions of the patient's history were reviewed and updated as appropriate: allergies, current medications, past family history, past medical history, past social history, past surgical history and problem list.    Review of Systems   Constitutional: Negative for activity change, chills, diaphoresis, fatigue and fever.   HENT: Positive for sore throat. Negative for congestion.    Eyes: Negative for visual disturbance.   Respiratory: Negative for cough and shortness of breath.    Cardiovascular:  "Negative for chest pain.   Gastrointestinal: Negative for abdominal pain, bowel incontinence, constipation, diarrhea, nausea and vomiting.   Endocrine: Negative for polyuria.   Genitourinary: Negative for bladder incontinence, difficulty urinating and dysuria.   Musculoskeletal: Positive for arthralgias, back pain, gait problem (knee scooter), joint swelling (left foot) and myalgias (fibromyalgia). Negative for neck pain.   Allergic/Immunologic: Negative for immunocompromised state.   Neurological: Positive for numbness (peripheral neuropathy). Negative for dizziness, weakness, light-headedness and headaches.   Psychiatric/Behavioral: Negative for agitation, sleep disturbance and suicidal ideas. The patient is not nervous/anxious.      --  The aforementioned information the Chief Complaint section and above subjective data including any HPI data, and also the Review of Systems data, has been personally reviewed and affirmed.  --    Vitals:    05/24/21 0904   BP: 149/93  Comment: pt sts did not take bp meds this am   Pulse: 96   Resp: 20   Temp: 96.8 °F (36 °C)   SpO2: 100%   Weight: 67.6 kg (149 lb)   Height: 172.7 cm (68\")   PainSc:   6   PainLoc: Back     Objective   Physical Exam  Vitals and nursing note reviewed.   Constitutional:       Appearance: Normal appearance. She is well-developed.   Eyes:      General: Lids are normal.   Cardiovascular:      Rate and Rhythm: Normal rate.   Pulmonary:      Effort: Pulmonary effort is normal.   Musculoskeletal:      Cervical back: Normal range of motion.      Lumbar back: Tenderness and bony tenderness present.      Left foot: Tenderness present.      Comments: 2 incisions to left foot.  Both incisions clean, dry, without erythema or drainage. Patient has a boot on her left lower extremity and is also utilizing a leg scooter for ambulation.    Neurological:      Mental Status: She is alert and oriented to person, place, and time.      Gait: Gait abnormal (knee scooter). "   Psychiatric:         Attention and Perception: Attention normal.         Mood and Affect: Mood normal.         Speech: Speech normal.         Behavior: Behavior normal.         Judgment: Judgment normal.       Assessment/Plan   Diagnoses and all orders for this visit:    1. SI (sacroiliac) joint dysfunction (Primary)  -     Urine Drug Screen Confirmation - Urine, Clean Catch; Future    2. Sacroiliac joint dysfunction of both sides  -     Urine Drug Screen Confirmation - Urine, Clean Catch; Future    3. Encounter for long-term (current) use of medications  -     Urine Drug Screen Confirmation - Urine, Clean Catch; Future    4. Chronic pain syndrome  -     Urine Drug Screen Confirmation - Urine, Clean Catch; Future    5. Disorder of peripheral nervous system    6. B12 neuropathy (CMS/HCC)    Other orders  -     pregabalin (LYRICA) 200 MG capsule; Take 1 capsule by mouth 3 (Three) Times a Day. DNF 11/28/2020  Dispense: 90 capsule; Refill: 5  -     HYDROcodone-acetaminophen (NORCO) 5-325 MG per tablet; Take 1 tablet by mouth Every 6 (Six) Hours As Needed for Severe Pain . DNF 5/28/2021  Dispense: 120 tablet; Refill: 0      --- Routine UDS in office today as part of monitoring requirements for controlled substances.  This specimen will be sent to [facility] for confirmation.     --- Refill [place] 5/325. DNF 5/28/2021 applied. Patient appears stable with current regimen. No adverse effects. Regarding continuation of opioids, there is no evidence of aberrant behavior or any red flags.  The patient continues with appropriate response to opioid therapy. ADL's remain intact by self.   --- Refill Lyrica 200 mg TID.   --- The patient signed an updated copy of the controlled substance agreement on 5/24/2021  --- Reviewed must receive permission from our office prior to filling any opioid prescriptions from another provider.   --- With neuropathy diagnosis reviewed importance of checking her left foot daily for any  signs of breakdown while she is in the boot prescribed by her podiatrist. Patient states she has been doing this and will continue.   --- Follow-up 2 months or sooner if needed.      POLLY REPORT  As part of the patient's treatment plan, I am prescribing controlled substances. The patient has been made aware of appropriate use of such medications, including potential risk of somnolence, limited ability to drive and/or work safely, and the potential for dependence or overdose. It has also bee made clear that these medications are for use by this patient only, without concomitant use of alcohol or other substances unless prescribed.     Patient has completed prescribing agreement detailing terms of continued prescribing of controlled substances, including monitoring POLLY reports, urine drug screening, and pill counts if necessary. The patient is aware that inappropriate use will results in cessation of prescribing such medications.    POLLY report has been reviewed and scanned into the patient's chart.    As the clinician, I personally reviewed the POLLY from 5/24/2021 while the patient was in the office today.    History and physical exam exhibit continued safe and appropriate use of controlled substances.    EMR Dragon/Transcription disclaimer:   Much of this encounter note is an electronic transcription/translation of spoken language to printed text. The electronic translation of spoken language may permit erroneous, or at times, nonsensical words or phrases to be inadvertently transcribed; Although I have reviewed the note for such errors, some may still exist.

## 2021-06-05 NOTE — H&P
History and Physical      Patient Name: Ana Maria Story   Patient ID: [id number]   Sex: Female   YOB: 1970    Primary Care Provider: Karmen RIVERA   Referring Provider: Karmen RIVERA    Visit Date: May 18, 2021    Provider: MIGUEL Sadler   Location[place] [facility]   Location Address: [address] [place] [address]  [id number]   Location Phone: [phone]          Chief Complaint  · Hx polyps  · Fam Hx of Colon Cancer  · IBS-D symptoms  · Low B-12      History Of Present Illness  Ana Maria Story is a 50 year old /White female who presents to the office today.      New (old) pt presents w c/o intermittent diarrhea, becoming more frequent. Hx of constipation-pt was last seen 5/2017 and was prescribed Amitiza at that time. No blood in stool, has seen on occasion w wiping. Pt had surgery on left foot 4/28, she has had some constipation since then. No diarrhea this week.  Usually no abd pain other than cramps before BM. Some upper abd pressure and HB, mainly if overeats or has large portion. Taking OTC omeprazole, states works well,  pt had previously on Dexilant. Hx Shore's.   Takes Hydrocodone chronically for fibromyalgia/arthritis, sees pain management.    Last EGD 3/2017: + short segment Shore's endo and bx +  Last colon 7/2014: 5 mm adenomatous polyp in cecum, 2 mm HP sigmoid polyp, random colon bx negative    Denies cardiopulmonary hx.       Past Medical History  Allergy, Unspecified; Arthritis; Arthropathy, unspecified; Bilateral Carpal Tunnel Syndrome; Chronic pain syndrome; Cubital tunnel syndrome, unspecified laterality; Foot pain, left; Foot pain, left; GERD; Hyperlipidemia; Hypertriglyceridemia; Hypothyroidism, Acquired; IBS (irritable bowel syndrome); Impaired fasting glucose; Iron deficiency; Neuropathy; Tobacco Abuse, Chronic; Urinary Incontinence; Vitamin B12 Deficiency; Vitamin D deficiency         Past Surgical History  Arm  Surgery; Cholecystectomy; Colonoscopy; EGD; Hand surgery; Hysterectomy; Tubal ligation         Medication List  Name Date Started Instructions   B-12 Compliance 1,000 mcg/mL injection kit  inject 0.1 milliliter (100 mcg) by intramuscular route once a month   cyclobenzaprine 10 mg oral tablet  take 1 tablet (10 mg) by oral route 2 times per day   hydrocodone-acetaminophen 5-325 mg oral tablet  take 1 tablet by oral route every 6 hours as needed for pain   Lyrica 200 mg oral capsule  take 1 capsule (200 mg) by oral route 3 times per day   omeprazole 10 mg oral capsule,delayed release(DR/EC)  take 1 capsule by oral route daily   Vitamin D2 1,250 mcg (50,000 unit) oral capsule  take 1 capsule by oral route once a W         Allergy List  NO KNOWN DRUG ALLERGIES; Adhesives; Latex       Allergies Reconciled  Family Medical History  Diabetes Mellitus, Type II; FH: breast cancer; FH: heart disease; FH: hypertension         Social History  Alcohol (Never); Tobacco (Current every day)         Immunizations  Name Date Admin   Hepatitis A 05/07/2018   Influenza 08/14/2017         Review of Systems  · Constitutional  o Admits  o : good general health lately, no acute distress  · Eyes  o Denies  o : cataracts, glaucoma  · HENT  o Denies  o : hearing problems, trouble swallowing  · Cardiovascular  o Denies  o : irregular heartbeat, heart failure  · Respiratory  o Denies  o : asthma, shortness of breath  · Gastrointestinal  o Denies  o : additional gastrointestinal symptoms except as noted in the HPI  · Genitourinary  o Denies  o : dysuria, kidney stone  · Integument  o Admits  o : other skin problems--eczema  o Denies  o : rashes, sores  · Neurologic  o Denies  o : strokes, seizure activity  · Musculoskeletal  o Admits  o : arthritis, bone or joint pain  · Psychiatric  o Denies  o : depression, anxiety  · Heme-Lymph  o Denies  o : bleeding disorder  · Allergic-Immunologic  o Admits  o : seasonal allergies      Vitals  Date Time BP  "Position Site L\R Cuff Size HR RR TEMP (F) WT  HT  BMI kg/m2 BSA m2 O2 Sat FR L/min FiO2 HC       04/12/2021 01:02 /97 Sitting    82 - R  97.5 145lbs 0oz 5'  8\" 22.05 1.78 99 %      05/18/2021 10:57 /78 Sitting    112 - R  98.4 135lbs 2oz 5'  8\" 20.55 1.71             Physical Examination  · Constitutional  o Appearance  o : well developed, well-nourished, in no acute distress  · Head and Face  o Head  o :   § Inspection  § : atraumatic, normocephalic  · Eyes  o Sclerae  o : sclerae white, no sclerae icterus  · Neck  o Inspection/Palpation  o : supple  · Respiratory  o Respiratory Effort  o : breathing unlabored  o Inspection of Chest  o : normal appearance, no retractions  · Cardiovascular  o Peripheral Vascular System  o :   § Extremities  § : no cyanosis, clubbing or edema  · Gastrointestinal  o Abdominal Examination  o : soft, nontender to palpation  · Skin and Subcutaneous Tissue  o General Inspection  o : no lesions present, no rashes present  · Neurologic  o Mental Status Examination  o :   § Orientation  § : grossly oriented to person, place and time  § Speech/Language  § : communication ability within normal limits, voice quality normal, articulation of speech normal, no evidence of aphasia  § Attention  § : attention normal, concentration abilities normal  o Sensation  o : grossly intact  o Gait and Station  o :   § Gait Screening  § : normal gait  · Psychiatric  o General  o : Alert and oriented x3  o Mood and Affect  o : Mood and affect are appropriate to circumstances          Assessment  · Pre-op exam     V72.84/Z01.818  · 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  · Diarrhea     787.91/R19.7  · Heartburn     787.1/R12  · History of colon polyps     V12.72/Z86.010      Plan  · Orders  o [facility] Pre-Op Covid-19 Screening (78524) - - 05/18/2021  · Medications  o Golytely 236-22.74-6.74 -5.86 gram oral recon soln   SIG: take as directed   DISP: (1) Box with 0 refills  Prescribed on 05/18/2021 "     o Medications have been Reconciled  o Transition of Care or Provider Policy  · Instructions  o Please Sign Permit for: EGD/COLONOSCOPY  o Indication: Shore's, change in bowel pattern, intermittent diarrhea  o Surgical Risk and Benefits: Possible risks/complications, benefits, and alternatives to surgical or invasive procedure have been explained to patient and/or legal guardian; Patient has been evaluated and can tolerate anesthesia and/or sedation. Risks, benefits, and alternatives to anesthesia and sedation have been explained to patient and/or legal guardian.  o Follow Up after Procedure.  o Encouraged to follow-up with Primary Care Provider for preventative care.  o Patient was educated/instructed on their diagnosis, treatment and medications prior to discharge from the clinic today.  o Patient instructed to seek medical attention urgently for new or worsening symptoms.  o RF Omeprazole 20 mg/d from facesheet            Electronically Signed by: MIGUEL Sadler -Author on May 18, 2021 01:04:33 PM

## 2021-06-21 RX ORDER — HYDROCODONE BITARTRATE AND ACETAMINOPHEN 5; 325 MG/1; MG/1
1 TABLET ORAL EVERY 6 HOURS PRN
Qty: 120 TABLET | Refills: 0 | OUTPATIENT
Start: 2021-06-21

## 2021-06-21 NOTE — TELEPHONE ENCOUNTER
Provider: MOLLY DOTSON    Caller: PATIENT     Relationship to Patient: SELF    Pharmacy: [facility]    Phone Number: [phone]    Reason for Call: PATIENT IS REQUESTING A REFILL ON HYDROcodone-acetaminophen 5-325 MG PER TABLETS

## 2021-06-21 NOTE — TELEPHONE ENCOUNTER
Medication Refill Request    Date of phone call: 21    Medication being requested: norco 5/325mg si tab po q 6 hours prn   Qty: 120    Date of last visit: 21    Date of last refill:     POLLY up to date?:     Next Follow up?: 21    Any new pertinent information? (i.e, new medication allergies, new use of medications, change in patient's health or condition, non-compliance or inconsistency with prescribing agreement?):

## 2021-06-22 RX ORDER — HYDROCODONE BITARTRATE AND ACETAMINOPHEN 5; 325 MG/1; MG/1
1 TABLET ORAL EVERY 6 HOURS PRN
Qty: 120 TABLET | Refills: 0 | Status: SHIPPED | OUTPATIENT
Start: 2021-06-22 | End: 2021-07-21 | Stop reason: SDUPTHER

## 2021-06-24 RX ORDER — CYCLOBENZAPRINE HCL 10 MG
TABLET ORAL
Status: ON HOLD | COMMUNITY
End: 2021-06-30

## 2021-06-24 RX ORDER — ERGOCALCIFEROL 1.25 MG/1
CAPSULE ORAL
Status: ON HOLD | COMMUNITY
End: 2021-06-30

## 2021-06-24 RX ORDER — CYANOCOBALAMIN 1000 UG/ML
0.1 INJECTION, SOLUTION INTRAMUSCULAR; SUBCUTANEOUS
Status: ON HOLD | COMMUNITY
End: 2021-06-30

## 2021-06-24 RX ORDER — HYDROCODONE BITARTRATE AND ACETAMINOPHEN 5; 325 MG/1; MG/1
TABLET ORAL
Status: ON HOLD | COMMUNITY
End: 2021-06-30

## 2021-06-24 RX ORDER — OMEPRAZOLE 20 MG/1
CAPSULE, DELAYED RELEASE ORAL
Status: ON HOLD | COMMUNITY
Start: 2021-05-18 | End: 2021-06-30

## 2021-06-24 RX ORDER — PREGABALIN 200 MG/1
CAPSULE ORAL
Status: ON HOLD | COMMUNITY
End: 2021-06-30

## 2021-06-25 ENCOUNTER — LAB (OUTPATIENT)
Dept: LAB | Facility: HOSPITAL | Age: 51
End: 2021-06-25

## 2021-06-25 DIAGNOSIS — Z01.818 PRE-OP TESTING: ICD-10-CM

## 2021-06-25 LAB — SARS-COV-2 RNA RESP QL NAA+PROBE: NOT DETECTED

## 2021-06-25 PROCEDURE — C9803 HOPD COVID-19 SPEC COLLECT: HCPCS

## 2021-06-25 PROCEDURE — U0003 INFECTIOUS AGENT DETECTION BY NUCLEIC ACID (DNA OR RNA); SEVERE ACUTE RESPIRATORY SYNDROME CORONAVIRUS 2 (SARS-COV-2) (CORONAVIRUS DISEASE [COVID-19]), AMPLIFIED PROBE TECHNIQUE, MAKING USE OF HIGH THROUGHPUT TECHNOLOGIES AS DESCRIBED BY CMS-2020-01-R: HCPCS

## 2021-06-30 ENCOUNTER — HOSPITAL ENCOUNTER (OUTPATIENT)
Facility: HOSPITAL | Age: 51
Setting detail: HOSPITAL OUTPATIENT SURGERY
Discharge: HOME | End: 2021-06-30
Attending: INTERNAL MEDICINE | Admitting: INTERNAL MEDICINE

## 2021-06-30 ENCOUNTER — ANESTHESIA (OUTPATIENT)
Dept: GASTROENTEROLOGY | Facility: HOSPITAL | Age: 51
End: 2021-06-30

## 2021-06-30 ENCOUNTER — ANESTHESIA EVENT (OUTPATIENT)
Dept: GASTROENTEROLOGY | Facility: HOSPITAL | Age: 51
End: 2021-06-30

## 2021-06-30 VITALS
BODY MASS INDEX: 22.25 KG/M2 | RESPIRATION RATE: 16 BRPM | SYSTOLIC BLOOD PRESSURE: 132 MMHG | OXYGEN SATURATION: 97 % | DIASTOLIC BLOOD PRESSURE: 87 MMHG | WEIGHT: 146.83 LBS | HEART RATE: 97 BPM | HEIGHT: 68 IN | TEMPERATURE: 97.8 F

## 2021-06-30 DIAGNOSIS — Z12.11 COLON CANCER SCREENING: ICD-10-CM

## 2021-06-30 DIAGNOSIS — K21.9 GERD (GASTROESOPHAGEAL REFLUX DISEASE): ICD-10-CM

## 2021-06-30 PROBLEM — D12.2 ADENOMATOUS POLYP OF ASCENDING COLON: Status: ACTIVE | Noted: 2021-06-30

## 2021-06-30 PROCEDURE — 43239 EGD BIOPSY SINGLE/MULTIPLE: CPT | Performed by: INTERNAL MEDICINE

## 2021-06-30 PROCEDURE — 25010000002 PROPOFOL 10 MG/ML EMULSION: Performed by: NURSE ANESTHETIST, CERTIFIED REGISTERED

## 2021-06-30 PROCEDURE — 45385 COLONOSCOPY W/LESION REMOVAL: CPT | Performed by: INTERNAL MEDICINE

## 2021-06-30 PROCEDURE — 88305 TISSUE EXAM BY PATHOLOGIST: CPT | Performed by: INTERNAL MEDICINE

## 2021-06-30 RX ORDER — SODIUM CHLORIDE, SODIUM LACTATE, POTASSIUM CHLORIDE, CALCIUM CHLORIDE 600; 310; 30; 20 MG/100ML; MG/100ML; MG/100ML; MG/100ML
30 INJECTION, SOLUTION INTRAVENOUS CONTINUOUS
Status: DISCONTINUED | OUTPATIENT
Start: 2021-06-30 | End: 2021-06-30 | Stop reason: HOSPADM

## 2021-06-30 RX ORDER — SODIUM CHLORIDE, SODIUM LACTATE, POTASSIUM CHLORIDE, CALCIUM CHLORIDE 600; 310; 30; 20 MG/100ML; MG/100ML; MG/100ML; MG/100ML
1000 INJECTION, SOLUTION INTRAVENOUS CONTINUOUS
Status: DISCONTINUED | OUTPATIENT
Start: 2021-06-30 | End: 2021-06-30 | Stop reason: HOSPADM

## 2021-06-30 RX ORDER — LIDOCAINE HYDROCHLORIDE 20 MG/ML
INJECTION, SOLUTION INFILTRATION; PERINEURAL AS NEEDED
Status: DISCONTINUED | OUTPATIENT
Start: 2021-06-30 | End: 2021-06-30 | Stop reason: SURG

## 2021-06-30 RX ADMIN — PROPOFOL 250 MCG/KG/MIN: 10 INJECTION, EMULSION INTRAVENOUS at 07:30

## 2021-06-30 RX ADMIN — SODIUM CHLORIDE, POTASSIUM CHLORIDE, SODIUM LACTATE AND CALCIUM CHLORIDE 30 ML/HR: 600; 310; 30; 20 INJECTION, SOLUTION INTRAVENOUS at 07:17

## 2021-06-30 RX ADMIN — LIDOCAINE HYDROCHLORIDE 40 MG: 20 INJECTION, SOLUTION INFILTRATION; PERINEURAL at 07:30

## 2021-06-30 NOTE — ANESTHESIA POSTPROCEDURE EVALUATION
Patient: Ana Maria Story    Procedure Summary     Date: 06/30/21 Room / Location: [facility] END[facility]COPY 4 / [facility]    Anesthesia Start: 0730 Anesthesia Stop: 0816    Procedures:       COLONOSCOPY (N/A )      ESOPHAGOGASTRODUODENOSCOPY (N/A ) Diagnosis: (CHANGE IN BOWEL PATTERN, DIARRHEA, BARRETTS)    Surgeons: Lee Garcia MD Provider: Prem Peña MD    Anesthesia Type: general ASA Status: 3          Anesthesia Type: general    Vitals  Vitals Value Taken Time   /87 06/30/21 0831   Temp 36.6 °C (97.8 °F) 06/30/21 0831   Pulse 97 06/30/21 0831   Resp 16 06/30/21 0831   SpO2 97 % 06/30/21 0831           Post Anesthesia Care and Evaluation    Patient location during evaluation: bedside  Patient participation: complete - patient participated  Level of consciousness: awake  Pain score: 0  Pain management: adequate  Airway patency: patent  Anesthetic complications: No anesthetic complications  PONV Status: none  Cardiovascular status: acceptable and stable  Respiratory status: acceptable and room air  Hydration status: acceptable

## 2021-06-30 NOTE — ANESTHESIA PREPROCEDURE EVALUATION
Anesthesia Evaluation     Patient summary reviewed and Nursing notes reviewed   no history of anesthetic complications:  NPO Solid Status: > 8 hours  NPO Liquid Status: > 2 hours           Airway   Mallampati: II  TM distance: >3 FB  Neck ROM: full  No difficulty expected  Dental      Pulmonary - normal exam    breath sounds clear to auscultation  (+) sleep apnea,   Cardiovascular - negative cardio ROS and normal exam  Exercise tolerance: good (4-7 METS)    Rhythm: regular        Neuro/Psych  (+) numbness,     GI/Hepatic/Renal/Endo    (+)  hiatal hernia, GERD,      Musculoskeletal     Abdominal    Substance History - negative use     OB/GYN negative ob/gyn ROS         Other   arthritis,                      Anesthesia Plan    ASA 3     general   total IV anesthesia  intravenous induction     Anesthetic plan, all risks, benefits, and alternatives have been provided, discussed and informed consent has been obtained with: patient and spouse/significant other.

## 2021-07-01 LAB
CYTO UR: NORMAL
LAB AP CASE REPORT: NORMAL
LAB AP CLINICAL INFORMATION: NORMAL
PATH REPORT.FINAL DX SPEC: NORMAL
PATH REPORT.GROSS SPEC: NORMAL

## 2021-07-02 ENCOUNTER — TELEPHONE (OUTPATIENT)
Dept: GASTROENTEROLOGY | Facility: CLINIC | Age: 51
End: 2021-07-02

## 2021-07-02 NOTE — TELEPHONE ENCOUNTER
----- Message from MIGUEL Coronado sent at 7/2/2021 11:36 AM EDT -----  Recall EGD 3 yrs recall colon 5 yrs; bx ok schedule f/u

## 2021-07-15 VITALS
WEIGHT: 135.12 LBS | HEIGHT: 68 IN | TEMPERATURE: 98.4 F | BODY MASS INDEX: 20.48 KG/M2 | DIASTOLIC BLOOD PRESSURE: 78 MMHG | SYSTOLIC BLOOD PRESSURE: 159 MMHG | HEART RATE: 112 BPM

## 2021-07-21 ENCOUNTER — OFFICE VISIT (OUTPATIENT)
Dept: PAIN MEDICINE | Facility: CLINIC | Age: 51
End: 2021-07-21

## 2021-07-21 VITALS
BODY MASS INDEX: 21.89 KG/M2 | WEIGHT: 144.4 LBS | TEMPERATURE: 96.8 F | SYSTOLIC BLOOD PRESSURE: 144 MMHG | HEART RATE: 101 BPM | RESPIRATION RATE: 16 BRPM | DIASTOLIC BLOOD PRESSURE: 85 MMHG | HEIGHT: 68 IN | OXYGEN SATURATION: 100 %

## 2021-07-21 DIAGNOSIS — E53.8 B12 NEUROPATHY (HCC): ICD-10-CM

## 2021-07-21 DIAGNOSIS — G63 B12 NEUROPATHY (HCC): ICD-10-CM

## 2021-07-21 DIAGNOSIS — Z79.899 ENCOUNTER FOR LONG-TERM (CURRENT) USE OF MEDICATIONS: ICD-10-CM

## 2021-07-21 DIAGNOSIS — G89.4 CHRONIC PAIN SYNDROME: ICD-10-CM

## 2021-07-21 DIAGNOSIS — M53.3 SACROILIAC JOINT DYSFUNCTION OF BOTH SIDES: Primary | ICD-10-CM

## 2021-07-21 PROCEDURE — 99214 OFFICE O/P EST MOD 30 MIN: CPT | Performed by: NURSE PRACTITIONER

## 2021-07-21 RX ORDER — CYCLOBENZAPRINE HCL 10 MG
10 TABLET ORAL 3 TIMES DAILY PRN
Qty: 90 TABLET | Refills: 3 | Status: SHIPPED | OUTPATIENT
Start: 2021-07-21 | End: 2021-11-04 | Stop reason: HOSPADM

## 2021-07-21 RX ORDER — HYDROCODONE BITARTRATE AND ACETAMINOPHEN 5; 325 MG/1; MG/1
1 TABLET ORAL EVERY 6 HOURS PRN
Qty: 120 TABLET | Refills: 0 | Status: SHIPPED | OUTPATIENT
Start: 2021-07-21 | End: 2021-08-23 | Stop reason: SDUPTHER

## 2021-07-21 NOTE — PROGRESS NOTES
CHIEF COMPLAINT  F/U back pain- patient states that her pain has remained the same since her last visit.    Subjective   Ana Maria Story is a 50 y.o. female  who presents for follow-up.  She has a history of back pain and peripheral neuropathy.  Today her pain is 4/10VAS in severity. She is currently maintained on Hydrocodone 5/325 4/day, Lyrica 200 mg 2-3/day, Flexeril 10 mg 2/day PRN, and compounded pain cream. This medication regimen decreases her pain by moderate amount. ADLs by self. She denies any side effects including constipation or somnolence.     Receives good relief with Bilateral SI injections. Last performed 4/8/2021. Patient would like to hold off on repeating this at this time.  She will call our office when she is ready to repeat.     Patient stubbed her 4th toe on her left foot last night.  It is bruised today and she reports this is quite painful.  Reviewed with patient to keep a close eye on the skin over that toe while this heels as she is at higher risk of wounds with her neuropathy. Patient states understanding.     Patient remained masked during entire encounter. No cough present. I donned a mask and eye protection throughout entire visit. Prior to donning mask and eye protection, hand hygiene was performed, as well as when it was doffed.  I was closer than 6 feet, but not for an extended period of time. No obvious exposure to any bodily fluids.    Peripheral Neuropathy  This is a chronic problem. The current episode started more than 1 year ago. The problem occurs constantly. The problem has been gradually worsening. Associated symptoms include abdominal pain, arthralgias, fatigue, joint swelling (left foot), myalgias (fibromyalgia), numbness, a sore throat and weakness. Pertinent negatives include no chest pain, chills, congestion, coughing, diaphoresis, fever, headaches, nausea, neck pain or vomiting. Nothing (cold weather) aggravates the symptoms. She has tried NSAIDs and oral narcotics  (Norco 5/325, Lyrica 200 mg TID, Flexeril at HS, Elavil 150 mg HS) for the symptoms. The treatment provided moderate relief.   Back Pain  This is a chronic problem. The current episode started more than 1 year ago. The problem occurs constantly. The problem is unchanged. The pain is present in the lumbar spine and thoracic spine. The pain is at a severity of 4/10. The pain is moderate. Associated symptoms include abdominal pain, numbness and weakness. Pertinent negatives include no bladder incontinence, bowel incontinence, chest pain, dysuria, fever or headaches. She has tried analgesics and muscle relaxant (SI injection, norco, flexeril) for the symptoms. The treatment provided moderate relief.      PEG Assessment   What number best describes your pain on average in the past week?7  What number best describes how, during the past week, pain has interfered with your enjoyment of life?7  What number best describes how, during the past week, pain has interfered with your general activity?  7    The following portions of the patient's history were reviewed and updated as appropriate: allergies, current medications, past family history, past medical history, past social history, past surgical history and problem list.    Review of Systems   Constitutional: Positive for activity change (decreased) and fatigue. Negative for chills, diaphoresis and fever.   HENT: Positive for sore throat. Negative for congestion.    Eyes: Negative for visual disturbance.   Respiratory: Negative for cough, chest tightness and shortness of breath.    Cardiovascular: Negative for chest pain.   Gastrointestinal: Positive for abdominal pain, constipation and diarrhea. Negative for bowel incontinence, nausea and vomiting.   Genitourinary: Negative for bladder incontinence, difficulty urinating, dyspareunia and dysuria.   Musculoskeletal: Positive for arthralgias, back pain, joint swelling (left foot) and myalgias (fibromyalgia). Negative for  "neck pain.   Neurological: Positive for weakness and numbness. Negative for dizziness, light-headedness and headaches.   Psychiatric/Behavioral: Positive for sleep disturbance. Negative for agitation. The patient is not nervous/anxious.      --  The aforementioned information the Chief Complaint section and above subjective data including any HPI data, and also the Review of Systems data, has been personally reviewed and affirmed.  --    Vitals:    07/21/21 0912   BP: 144/85   Pulse: 101   Resp: 16   Temp: 96.8 °F (36 °C)   SpO2: 100%   Weight: 65.5 kg (144 lb 6.4 oz)   Height: 172.7 cm (68\")   PainSc:   4   PainLoc: Back     Objective   Physical Exam  Vitals and nursing note reviewed.   Constitutional:       Appearance: Normal appearance. She is well-developed.   Eyes:      General: Lids are normal.   Cardiovascular:      Rate and Rhythm: Normal rate.   Pulmonary:      Effort: Pulmonary effort is normal.   Musculoskeletal:      Cervical back: Normal range of motion.      Lumbar back: No tenderness or bony tenderness. Decreased range of motion.      Left foot: Tenderness present.      Comments: Healed surgical scars to left foot   Neurological:      Mental Status: She is alert and oriented to person, place, and time.      Gait: Gait abnormal.   Psychiatric:         Attention and Perception: Attention normal.         Mood and Affect: Mood normal.         Speech: Speech normal.         Behavior: Behavior normal.         Judgment: Judgment normal.       Assessment/Plan   Diagnoses and all orders for this visit:    1. Sacroiliac joint dysfunction of both sides (Primary)    2. Encounter for long-term (current) use of medications    3. Chronic pain syndrome    4. B12 neuropathy (CMS/HCC)    Other orders  -     cyclobenzaprine (FLEXERIL) 10 MG tablet; Take 1 tablet by mouth 3 (Three) Times a Day As Needed for Muscle Spasms.  Dispense: 90 tablet; Refill: 3  -     HYDROcodone-acetaminophen (NORCO) 5-325 MG per tablet; Take 1 " tablet by mouth Every 6 (Six) Hours As Needed for Severe Pain . DNF 7/23/2021  Dispense: 120 tablet; Refill: 0      --- May repeat Bilateral SI injection x 1 PRN, patient will call when she is ready to proceed.   --- CSA updated 5/24/2021  --- The urine drug screen confirmation from 5/24/2021 has been reviewed and the result is appropriate based on patient history and POLLY report  --- Refill [place] 5/325. DNF 7/23/2021 applied. Patient appears stable with current regimen. No adverse effects. Regarding continuation of opioids, there is no evidence of aberrant behavior or any red flags.  The patient continues with appropriate response to opioid therapy. ADL's remain intact by self.   --- Refill Compounded pain cream  --- Follow-up 2 months or sooner if needed.      POLLY REPORT  As part of the patient's treatment plan, I am prescribing controlled substances. The patient has been made aware of appropriate use of such medications, including potential risk of somnolence, limited ability to drive and/or work safely, and the potential for dependence or overdose. It has also bee made clear that these medications are for use by this patient only, without concomitant use of alcohol or other substances unless prescribed.     Patient has completed prescribing agreement detailing terms of continued prescribing of controlled substances, including monitoring POLLY reports, urine drug screening, and pill counts if necessary. The patient is aware that inappropriate use will results in cessation of prescribing such medications.    As the clinician, I personally reviewed the POLLY from 7/21/2021 while the patient was in the office today.    History and physical exam exhibit continued safe and appropriate use of controlled substances.     Dictated utilizing Dragon dictation.

## 2021-08-11 ENCOUNTER — HOSPITAL ENCOUNTER (OUTPATIENT)
Dept: MAMMOGRAPHY | Facility: HOSPITAL | Age: 51
Discharge: HOME | End: 2021-08-11
Admitting: NURSE PRACTITIONER

## 2021-08-11 DIAGNOSIS — Z12.31 SCREENING MAMMOGRAM FOR HIGH-RISK PATIENT: ICD-10-CM

## 2021-08-11 PROCEDURE — 77063 BREAST TOMOSYNTHESIS BI: CPT

## 2021-08-11 PROCEDURE — 77067 SCR MAMMO BI INCL CAD: CPT | Performed by: RADIOLOGY

## 2021-08-11 PROCEDURE — 77067 SCR MAMMO BI INCL CAD: CPT

## 2021-08-11 PROCEDURE — 77063 BREAST TOMOSYNTHESIS BI: CPT | Performed by: RADIOLOGY

## 2021-08-18 ENCOUNTER — TRANSCRIBE ORDERS (OUTPATIENT)
Dept: ADMINISTRATIVE | Facility: HOSPITAL | Age: 51
End: 2021-08-18

## 2021-08-18 DIAGNOSIS — N63.0 BREAST NODULE: Primary | ICD-10-CM

## 2021-08-23 RX ORDER — HYDROCODONE BITARTRATE AND ACETAMINOPHEN 5; 325 MG/1; MG/1
1 TABLET ORAL EVERY 6 HOURS PRN
Qty: 120 TABLET | Refills: 0 | Status: SHIPPED | OUTPATIENT
Start: 2021-08-23 | End: 2021-09-21 | Stop reason: SDUPTHER

## 2021-08-23 NOTE — TELEPHONE ENCOUNTER
Caller: ARTI STARR    Relationship: SELF    Best call back number: [phone]    Medication needed: HYDROCODONE-ACETAMINOPHEN 5-325M TAB EVRY 6 HRS   Requested Prescriptions      No prescriptions requested or ordered in this encounter       When do you need the refill by: 2021    What additional details did the patient provide when requesting the medication:     Does the patient have less than a 3 day supply:  [x] Yes  [] No    What is the patient's preferred pharmacy: [facility]:  [address] [facility]: [phone].    HUB ATTEMPTED WARM TRANSFER

## 2021-08-23 NOTE — TELEPHONE ENCOUNTER
Medication Refill Request    Date of phone call: 8/23/2021    Medication being requested: hydro/apap 5-325 mg sig: take 1 tab q 6 hrs prn  Qty: 120    Date of last visit: 7/21/2021    Date of last refill: 7/23/2021    POLLY up to date?: yes    Next Follow up?: 9/21/2021    Any new pertinent information? (i.e, new medication allergies, new use of medications, change in patient's health or condition, non-compliance or inconsistency with prescribing agreement?):

## 2021-08-26 ENCOUNTER — HOSPITAL ENCOUNTER (OUTPATIENT)
Dept: MAMMOGRAPHY | Facility: HOSPITAL | Age: 51
Discharge: HOME | End: 2021-08-26

## 2021-08-26 ENCOUNTER — HOSPITAL ENCOUNTER (OUTPATIENT)
Dept: ULTRASOUND IMAGING | Facility: HOSPITAL | Age: 51
Discharge: HOME | End: 2021-08-26

## 2021-08-26 DIAGNOSIS — N63.0 BREAST NODULE: ICD-10-CM

## 2021-08-26 PROCEDURE — 76642 ULTRASOUND BREAST LIMITED: CPT | Performed by: RADIOLOGY

## 2021-08-26 PROCEDURE — G0279 TOMOSYNTHESIS, MAMMO: HCPCS

## 2021-08-26 PROCEDURE — 77061 BREAST TOMOSYNTHESIS UNI: CPT | Performed by: RADIOLOGY

## 2021-08-26 PROCEDURE — 77065 DX MAMMO INCL CAD UNI: CPT

## 2021-08-26 PROCEDURE — 76642 ULTRASOUND BREAST LIMITED: CPT

## 2021-08-26 PROCEDURE — 77065 DX MAMMO INCL CAD UNI: CPT | Performed by: RADIOLOGY

## 2021-09-07 RX ORDER — OMEPRAZOLE 20 MG/1
CAPSULE, DELAYED RELEASE ORAL
Qty: 30 CAPSULE | Refills: 1 | Status: SHIPPED | OUTPATIENT
Start: 2021-09-07 | End: 2021-09-21 | Stop reason: SDUPTHER

## 2021-09-21 ENCOUNTER — TRANSCRIBE ORDERS (OUTPATIENT)
Dept: SURGERY | Facility: SURGERY CENTER | Age: 51
End: 2021-09-21

## 2021-09-21 ENCOUNTER — OFFICE VISIT (OUTPATIENT)
Dept: PAIN MEDICINE | Facility: CLINIC | Age: 51
End: 2021-09-21

## 2021-09-21 ENCOUNTER — TRANSCRIBE ORDERS (OUTPATIENT)
Dept: LAB | Facility: SURGERY CENTER | Age: 51
End: 2021-09-21

## 2021-09-21 ENCOUNTER — PREP FOR SURGERY (OUTPATIENT)
Dept: SURGERY | Facility: SURGERY CENTER | Age: 51
End: 2021-09-21

## 2021-09-21 VITALS
SYSTOLIC BLOOD PRESSURE: 143 MMHG | WEIGHT: 142 LBS | DIASTOLIC BLOOD PRESSURE: 99 MMHG | RESPIRATION RATE: 16 BRPM | TEMPERATURE: 97.9 F | BODY MASS INDEX: 21.52 KG/M2 | OXYGEN SATURATION: 100 % | HEART RATE: 85 BPM | HEIGHT: 68 IN

## 2021-09-21 DIAGNOSIS — M79.606 PAIN OF LOWER EXTREMITY, UNSPECIFIED LATERALITY: ICD-10-CM

## 2021-09-21 DIAGNOSIS — M53.3 SACROILIAC JOINT DYSFUNCTION OF BOTH SIDES: Primary | ICD-10-CM

## 2021-09-21 DIAGNOSIS — M53.3 SACROILIAC JOINT DYSFUNCTION OF BOTH SIDES: ICD-10-CM

## 2021-09-21 DIAGNOSIS — Z79.899 ENCOUNTER FOR LONG-TERM (CURRENT) USE OF MEDICATIONS: Primary | ICD-10-CM

## 2021-09-21 DIAGNOSIS — K21.9 GASTROESOPHAGEAL REFLUX DISEASE, UNSPECIFIED WHETHER ESOPHAGITIS PRESENT: ICD-10-CM

## 2021-09-21 DIAGNOSIS — G89.4 CHRONIC PAIN SYNDROME: ICD-10-CM

## 2021-09-21 DIAGNOSIS — E53.8 B12 NEUROPATHY (HCC): ICD-10-CM

## 2021-09-21 DIAGNOSIS — Z01.818 OTHER SPECIFIED PRE-OPERATIVE EXAMINATION: Primary | ICD-10-CM

## 2021-09-21 DIAGNOSIS — G63 B12 NEUROPATHY (HCC): ICD-10-CM

## 2021-09-21 PROCEDURE — 99214 OFFICE O/P EST MOD 30 MIN: CPT | Performed by: NURSE PRACTITIONER

## 2021-09-21 RX ORDER — OMEPRAZOLE 20 MG/1
20 CAPSULE, DELAYED RELEASE ORAL DAILY
Qty: 30 CAPSULE | Refills: 0 | Status: SHIPPED | OUTPATIENT
Start: 2021-09-21 | End: 2021-11-04 | Stop reason: HOSPADM

## 2021-09-21 RX ORDER — SODIUM CHLORIDE 0.9 % (FLUSH) 0.9 %
10 SYRINGE (ML) INJECTION AS NEEDED
Status: CANCELLED | OUTPATIENT
Start: 2021-09-21

## 2021-09-21 RX ORDER — HYDROCODONE BITARTRATE AND ACETAMINOPHEN 5; 325 MG/1; MG/1
1 TABLET ORAL EVERY 6 HOURS PRN
Qty: 120 TABLET | Refills: 0 | Status: SHIPPED | OUTPATIENT
Start: 2021-09-21 | End: 2021-10-16 | Stop reason: SDUPTHER

## 2021-09-21 RX ORDER — SODIUM CHLORIDE 0.9 % (FLUSH) 0.9 %
10 SYRINGE (ML) INJECTION EVERY 12 HOURS SCHEDULED
Status: CANCELLED | OUTPATIENT
Start: 2021-09-21

## 2021-09-21 NOTE — PROGRESS NOTES
CHIEF COMPLAINT  F/U for back and peripheral neuropathy.      Subjective   Ana Maria Story is a 50 y.o. female  who presents for follow-up.  She has a history of back pain and peripheral neuropathy.  Her pain has worsened slightly due to now having custody of her grandchildren (toddler and ) which has increased her activity level significantly. Today her pain is 5/10VAS in severity. She is currently maintained on Hydrocodone 5/325 2-4/day, Lyrica 200 mg 2-3/day, Flexeril 10 mg 2/day PRN, and compounded pain cream. This medication regimen decreases her pain by 75%. ADLs by self. She denies any side effects including somnolence or constipation.     Receives good relief with Bilateral SI injections. Last performed 2021. She states that she is ready to repeat these today.     Patient remained masked during entire encounter. No cough present. I donned a mask and eye protection throughout entire visit. Prior to donning mask and eye protection, hand hygiene was performed, as well as when it was doffed.  I was closer than 6 feet, but not for an extended period of time. No obvious exposure to any bodily fluids.    Peripheral Neuropathy  This is a chronic problem. The current episode started more than 1 year ago. The problem occurs constantly. The problem has been gradually worsening. Associated symptoms include arthralgias, fatigue, joint swelling (left foot), myalgias (fibromyalgia), numbness, a sore throat and weakness. Pertinent negatives include no abdominal pain, chest pain, chills, congestion, coughing, diaphoresis, fever, headaches, nausea, neck pain or vomiting. Nothing (cold weather) aggravates the symptoms. She has tried NSAIDs and oral narcotics (Norco 5/325, Lyrica 200 mg TID, Flexeril at HS, Elavil 150 mg HS) for the symptoms. The treatment provided moderate relief.   Back Pain  This is a chronic problem. The current episode started more than 1 year ago. The problem occurs constantly. The problem is  unchanged. The pain is present in the lumbar spine and thoracic spine. The pain is at a severity of 5/10. The pain is moderate. Associated symptoms include numbness and weakness. Pertinent negatives include no abdominal pain, bladder incontinence, bowel incontinence, chest pain, dysuria, fever or headaches. She has tried analgesics and muscle relaxant (SI injection, norco, flexeril) for the symptoms. The treatment provided moderate relief.      PEG Assessment   What number best describes your pain on average in the past week?8  What number best describes how, during the past week, pain has interfered with your enjoyment of life?8  What number best describes how, during the past week, pain has interfered with your general activity?  8    The following portions of the patient's history were reviewed and updated as appropriate: allergies, current medications, past family history, past medical history, past social history, past surgical history and problem list.    Review of Systems   Constitutional: Positive for fatigue. Negative for activity change, chills, diaphoresis and fever.   HENT: Positive for sore throat. Negative for congestion.    Eyes: Negative for visual disturbance.   Respiratory: Negative for cough and chest tightness.    Cardiovascular: Negative for chest pain.   Gastrointestinal: Positive for constipation and diarrhea. Negative for abdominal pain, bowel incontinence, nausea and vomiting.   Genitourinary: Negative for bladder incontinence, difficulty urinating and dysuria.   Musculoskeletal: Positive for arthralgias, back pain, joint swelling (left foot) and myalgias (fibromyalgia). Negative for neck pain.   Neurological: Positive for weakness and numbness. Negative for dizziness, light-headedness and headaches.   Psychiatric/Behavioral: Positive for sleep disturbance. Negative for agitation and suicidal ideas. The patient is not nervous/anxious.      --  The aforementioned information the Chief  "Complaint section and above subjective data including any HPI data, and also the Review of Systems data, has been personally reviewed and affirmed.  --    Vitals:    09/21/21 0917   BP: 143/99   Pulse: 85   Resp: 16   Temp: 97.9 °F (36.6 °C)   TempSrc: Temporal   SpO2: 100%   Weight: 64.4 kg (142 lb)   Height: 172.7 cm (68\")   PainSc:   5   PainLoc: Back  Comment: peripheral neuropathy     Objective   Physical Exam  Vitals and nursing note reviewed.   Constitutional:       Appearance: Normal appearance. She is well-developed.   Eyes:      General: Lids are normal.   Cardiovascular:      Rate and Rhythm: Normal rate.   Pulmonary:      Effort: Pulmonary effort is normal.   Musculoskeletal:      Cervical back: Normal range of motion.      Lumbar back: Tenderness and bony tenderness present. Decreased range of motion.      Comments: POS Ranjit Varinder  POS Ranjit Thigh Thrust   Neurological:      Mental Status: She is alert and oriented to person, place, and time.      Gait: Gait normal.   Psychiatric:         Attention and Perception: Attention normal.         Mood and Affect: Mood normal.         Speech: Speech normal.         Behavior: Behavior normal.         Judgment: Judgment normal.       Assessment/Plan   Diagnoses and all orders for this visit:    1. Encounter for long-term (current) use of medications (Primary)    2. Chronic pain syndrome    3. Sacroiliac joint dysfunction of both sides    4. Pain of lower extremity, unspecified laterality    5. B12 neuropathy (CMS/HCC)    6. Gastroesophageal reflux disease, unspecified whether esophagitis present  -     omeprazole (priLOSEC) 20 MG capsule; Take 1 capsule by mouth Daily.  Dispense: 30 capsule; Refill: 0    Other orders  -     HYDROcodone-acetaminophen (NORCO) 5-325 MG per tablet; Take 1 tablet by mouth Every 6 (Six) Hours As Needed for Severe Pain . DNF 9/22/2021  Dispense: 120 tablet; Refill: 0      --- Repeat Bilateral SI injections  Reviewed the procedure at length " with the patient.  Included in the review was expectations, complications, risk and benefits.The procedure was described in detail and the risks, benefits and alternatives were discussed with the patient (including but not limited to: bleeding, infection, nerve damage, worsening of pain, inability to perform injection, paralysis, seizures, and death) who agreed to proceed.  Discussed the potential for sedation if warranted/wanted.  The procedure will plan to be performed at [facility] with fluoroscopic guidance(unless ultrasound is indicated) and could potentially have steroids and contrast dye used. Questions were answered and in a way the patient could understand.  Patient verbalized understanding and wishes to proceed.  This intervention will be ordered.  Discussed with patient that all procedures are part of a multimodal plan of care and include either formal PT or a home exercise program.  Patient has no evidence of coagulopathy or current infection.  --- The urine drug screen confirmation from 5/24/2021 has been reviewed and the result is appropriate based on patient history and POLLY report  --- CSA updated 5/24/2021  --- Refill [place] 5/325. DNF 9/22/2021 applied. Patient appears stable with current regimen. No adverse effects. Regarding continuation of opioids, there is no evidence of aberrant behavior or any red flags.  The patient continues with appropriate response to opioid therapy. ADL's remain intact by self.   --- Continue Lyrica 200 mg 2-3/day, no refill needed.   --- Patient missed her GI appt due to being out of town for a death in the family.  She is requesting a refill of her omeprazole x 1 month until she can see her GI.  I will refill this once.   --- Follow-up after procedure     POLLY REPORT  As part of the patient's treatment plan, I am prescribing controlled substances. The patient has been made aware of appropriate use of such medications, including potential risk  of somnolence, limited ability to drive and/or work safely, and the potential for dependence or overdose. It has also bee made clear that these medications are for use by this patient only, without concomitant use of alcohol or other substances unless prescribed.     Patient has completed prescribing agreement detailing terms of continued prescribing of controlled substances, including monitoring POLLY reports, urine drug screening, and pill counts if necessary. The patient is aware that inappropriate use will results in cessation of prescribing such medications.    As the clinician, I personally reviewed the POLLY from 9/21/2021 while the patient was in the office today.    History and physical exam exhibit continued safe and appropriate use of controlled substances.     Dictated utilizing Dragon dictation.

## 2021-09-30 RX ORDER — CYANOCOBALAMIN, ISOPROPYL ALCOHOL 1000MCG/ML
1000 KIT INJECTION
Status: ON HOLD | COMMUNITY
End: 2021-11-03

## 2021-09-30 RX ORDER — SENNOSIDES 8.6 MG
650 CAPSULE ORAL NIGHTLY PRN
COMMUNITY
End: 2021-11-04 | Stop reason: HOSPADM

## 2021-10-02 ENCOUNTER — LAB (OUTPATIENT)
Dept: LAB | Facility: SURGERY CENTER | Age: 51
End: 2021-10-02

## 2021-10-02 DIAGNOSIS — Z01.818 OTHER SPECIFIED PRE-OPERATIVE EXAMINATION: ICD-10-CM

## 2021-10-02 LAB — SARS-COV-2 ORF1AB RESP QL NAA+PROBE: NOT DETECTED

## 2021-10-02 PROCEDURE — U0004 COV-19 TEST NON-CDC HGH THRU: HCPCS | Performed by: SURGERY

## 2021-10-02 PROCEDURE — C9803 HOPD COVID-19 SPEC COLLECT: HCPCS

## 2021-10-05 ENCOUNTER — HOSPITAL ENCOUNTER (OUTPATIENT)
Facility: SURGERY CENTER | Age: 51
Setting detail: HOSPITAL OUTPATIENT SURGERY
Discharge: HOME | End: 2021-10-05
Attending: ANESTHESIOLOGY | Admitting: ANESTHESIOLOGY

## 2021-10-05 ENCOUNTER — APPOINTMENT (OUTPATIENT)
Dept: GENERAL RADIOLOGY | Facility: SURGERY CENTER | Age: 51
End: 2021-10-05

## 2021-10-05 ENCOUNTER — TELEPHONE (OUTPATIENT)
Dept: PAIN MEDICINE | Facility: CLINIC | Age: 51
End: 2021-10-05

## 2021-10-05 ENCOUNTER — HOSPITAL ENCOUNTER (OUTPATIENT)
Dept: GENERAL RADIOLOGY | Facility: SURGERY CENTER | Age: 51
Setting detail: HOSPITAL OUTPATIENT SURGERY
End: 2021-10-05

## 2021-10-05 VITALS
DIASTOLIC BLOOD PRESSURE: 95 MMHG | SYSTOLIC BLOOD PRESSURE: 154 MMHG | OXYGEN SATURATION: 100 % | TEMPERATURE: 97.5 F | RESPIRATION RATE: 16 BRPM | HEART RATE: 90 BPM

## 2021-10-05 DIAGNOSIS — M53.3 SACROILIAC JOINT DYSFUNCTION OF BOTH SIDES: ICD-10-CM

## 2021-10-05 PROCEDURE — G0260 INJ FOR SACROILIAC JT ANESTH: HCPCS | Performed by: ANESTHESIOLOGY

## 2021-10-05 PROCEDURE — 0 IOHEXOL 300 MG/ML SOLUTION 10 ML VIAL: Performed by: ANESTHESIOLOGY

## 2021-10-05 PROCEDURE — 3E0U33Z INTRODUCTION OF ANTI-INFLAMMATORY INTO JOINTS, PERCUTANEOUS APPROACH: ICD-10-PCS | Performed by: ANESTHESIOLOGY

## 2021-10-05 PROCEDURE — 3E0U3BZ INTRODUCTION OF ANESTHETIC AGENT INTO JOINTS, PERCUTANEOUS APPROACH: ICD-10-PCS | Performed by: ANESTHESIOLOGY

## 2021-10-05 PROCEDURE — 27096 INJECT SACROILIAC JOINT: CPT | Performed by: ANESTHESIOLOGY

## 2021-10-05 PROCEDURE — 25010000002 METHYLPREDNISOLONE PER 80 MG: Performed by: ANESTHESIOLOGY

## 2021-10-05 PROCEDURE — 77002 NEEDLE LOCALIZATION BY XRAY: CPT

## 2021-10-05 PROCEDURE — 76000 FLUOROSCOPY <1 HR PHYS/QHP: CPT | Performed by: ANESTHESIOLOGY

## 2021-10-05 RX ORDER — SODIUM CHLORIDE 0.9 % (FLUSH) 0.9 %
10 SYRINGE (ML) INJECTION AS NEEDED
Status: DISCONTINUED | OUTPATIENT
Start: 2021-10-05 | End: 2021-10-05 | Stop reason: HOSPADM

## 2021-10-05 RX ORDER — SODIUM CHLORIDE 0.9 % (FLUSH) 0.9 %
10 SYRINGE (ML) INJECTION EVERY 12 HOURS SCHEDULED
Status: DISCONTINUED | OUTPATIENT
Start: 2021-10-05 | End: 2021-10-05 | Stop reason: HOSPADM

## 2021-10-05 NOTE — TELEPHONE ENCOUNTER
Provider: DR. FRENCH    Caller:  PATIENT    Relationship to Patient: SELF      Phone Number:  [phone]    Reason for Call: PT. CALLING-STATES THAT SHE SAW  EARLIER TODAY FOR SI INJECTION.  SHE WANTED TO LET HIM KNOW THAT SHE WENT TO [facility] THIS AFTERNOON AND GOT HER FIRST COVID INJECTION.   JUST WANTED TO LET DR. FRENCH KNOW.     When was the patient last seen: TODAY

## 2021-10-05 NOTE — DISCHARGE INSTRUCTIONS
[facility] - Post-procedure Instructions          --  While there are no absolute restrictions, it is recommended that you do not perform strenuous activity today. In the morning, you may resume your level of activity as before your block.    --  If you have a band-aid at your injection site, please remove it later today. Observe the area for any redness, swelling, pus-like drainage, or a temperature over 101°. If any of these symptoms occur, please call your doctor at [phone]. If after office hours, leave a message and the on-call provider will return your call.    --  Ice may be applied to your injection site. It is recommended you avoid direct heat (heating pad; hot tub) for 1-2 days.    --  Call [facility] at [phone] if you experience persistent headache, persistent bleeding from the injection site, or severe pain not relieved by heat or oral medication.    --  Do not make important decisions today.    --  Due to the effects of the block and/or the I.V. Sedation, DO NOT drive or operate hazardous machinery for 12 hours.  Local anesthetics may cause numbness after procedure and precautions must be taken with regards to operating equipment as well as with walking, even if ambulating with assistance of another person or with an assistive device.    --  Do not drink alcohol for 12 hours.    -- You may return to work tomorrow, or as directed by your referring doctor.    --  Occasionally you may notice a slight increase in your pain after the procedure. This should start to improve within the next 24-48 hours. Radiofrequency ablation procedure pain may last 3-4 weeks.    --  It may take as long as 3-4 days before you notice a gradual improvement in your pain and/or other symptoms.    -- You may continue to take your prescribed pain medication as needed.    --  Some normal possible side effects of steroid use could include fluid retention, increased blood sugar, dull headache,  increased sweating, increased appetite, mood swings and flushing.    --  Diabetics are recommended to watch their blood glucose level closely for 24-48 hours after the injection.    --  Must stay in PACU for 20 min upon arrival and prove no leg weakness before being discharged.    --  IN THE EVENT OF A LIFE THREATENING EMERGENCY, (CHEST PAIN, BREATHING DIFFICULTIES, PARALYSIS…) YOU SHOULD GO TO YOUR NEAREST EMERGENCY ROOM.    --  You should be contacted by our office within 2-3 days to schedule follow up or next appointment date.  If not contacted within 7 days, please call the office at [phone]    --    -------  The CDC has recently released the following easy-to-read FAQs and responses to share with people in the community:      -------  According to the CDC, the only contraindication to receiving a Covid vaccine is allergic reaction after a previous dose of a Covid 19 vaccine or a known allergy to a component in a particular vaccine.  This situation would not prevent someone from getting vaccinated with one of the other vaccine products, since there are 3 different ones to choose from in our country.  If that is a concern we can work to make the best decision, so talk to me or talk to your primary care physician.  -------    Please help us to stop the spread.  Vaccines work.    Over 200 million Americans have received a Covid19 vaccine.  The statistics are undeniable.  -------

## 2021-10-05 NOTE — OP NOTE
Bilateral Sacroiliac Joint Injection  [facility]      PREOPERATIVE DIAGNOSIS:   Sacroiliac joint dysfunction, bilateral    POSTOPERATIVE DIAGNOSIS:  Sacroiliac joint dysfunction, bilateral    PROCEDURE:  Sacroiliac Joint Injection, Bilaterally, with fluoroscopic guidance    PRE-PROCEDURE DISCUSSION WITH PATIENT:    Risks and complications were discussed with the patient prior to starting the procedure and informed consent was obtained.  We discussed various topics including but not limited to bleeding, infection, injury, postprocedural site soreness, painful flareup, worsening of clinical picture, paralysis, coma, and death.     SURGEON:  Epifanio Plaza MD    REASON FOR PROCEDURE:    Patient has pain consistent with SI pathology on history and physical exam. Patient has other lumbrosacral pathology that makes the injection diagnostically necessary. Positive sacroiliac provocation maneuvers noted.   Previous clinically significant therapeutic effect of SI joint injection.      SEDATION:  Patient declined administration of moderate sedation    ANESTHETIC AGENT:  Marcaine 0.5%  STEROID AGENT:  Methylprednisolone (DEPO MEDROL) 80mg/ml    DESCRIPTON OF PROCEDURE:  After obtaining informed consent, IV access was not obtained in the preoperative area.  The patient was transported to the operative suite and placed in the prone position with a pillow under the pelvic area. EKG, blood pressure, and pulse oximeter were monitored. The lumbosacral area was prepped with Chloraprep and draped in a sterile fashion. Under fluoroscopic guidance the inferior most portion of the right SI joint was identified. The overlying skin and subcutaneous tissue was anesthetized with 1% lidocaine. A 22-gauge spinal needle was introduced from the inferior most portion of the joint into the RIGHT SI joint under fluoroscopic guidance in the AP dimension with slight oblique rotation to the contralateral side.  Aspiration was  negative.  After confirming the position of the needle with fluoroscopy, 1 mL of Omnipaque was injected and after seeing appropriate spread into the joint a total of 2mL Marcaine, with the steroid, was injected very slowly.  Needle was removed intact.  A similar procedure was performed on the LEFT side.   Vital signs remained stable.  The onset of analgesia was noted.      ESTIMATED BLOOD LOSS:  minimal  SPECIMENS:  None  COMPLICATIONS:  No complications were noted., There was no indication of vascular uptake on live injection of contrast dye., There was no indication of intrathecal uptake on live injection of contrast dye., There was not any evidence of dural puncture.   and The patient did not have any signs of postprocedure numbness nor weakness.    TOLERANCE & DISCHARGE CONDITION:    The patient tolerated the procedure well.  The patient was transported to the recovery area without difficulties.  The patient was discharged to home under the care of family in stable and satisfactory condition.    PLAN OF CARE:  1. The patient was given our standard instruction sheet and will resume all medications as per the medication reconciliation sheet.  2. The patient will Return to clinic 4-6 wks.  3. The patient is instructed to keep a pain log hourly for 8 hours after the procedure.

## 2021-10-13 ENCOUNTER — APPOINTMENT (OUTPATIENT)
Dept: GENERAL RADIOLOGY | Facility: HOSPITAL | Age: 51
End: 2021-10-13

## 2021-10-13 ENCOUNTER — HOSPITAL ENCOUNTER (EMERGENCY)
Facility: HOSPITAL | Age: 51
Discharge: HOME | End: 2021-10-13
Attending: EMERGENCY MEDICINE | Admitting: EMERGENCY MEDICINE

## 2021-10-13 VITALS
HEIGHT: 68 IN | OXYGEN SATURATION: 93 % | HEART RATE: 84 BPM | DIASTOLIC BLOOD PRESSURE: 64 MMHG | RESPIRATION RATE: 16 BRPM | TEMPERATURE: 98.2 F | SYSTOLIC BLOOD PRESSURE: 103 MMHG | BODY MASS INDEX: 21.12 KG/M2 | WEIGHT: 139.33 LBS

## 2021-10-13 DIAGNOSIS — Z51.81 MEDICATION MONITORING ENCOUNTER: ICD-10-CM

## 2021-10-13 DIAGNOSIS — N39.0 ACUTE UTI (URINARY TRACT INFECTION): Primary | ICD-10-CM

## 2021-10-13 DIAGNOSIS — R73.9 HYPERGLYCEMIA: ICD-10-CM

## 2021-10-13 DIAGNOSIS — T50.B95A ADVERSE REACTION TO COVID-19 VACCINE: ICD-10-CM

## 2021-10-13 DIAGNOSIS — R91.8 PULMONARY NODULES: ICD-10-CM

## 2021-10-13 LAB
ALBUMIN SERPL-MCNC: 2.8 G/DL (ref 3.5–5.2)
ALBUMIN/GLOB SERPL: 0.7 G/DL
ALP SERPL-CCNC: 124 U/L (ref 39–117)
ALT SERPL W P-5'-P-CCNC: 67 U/L (ref 1–33)
ANION GAP SERPL CALCULATED.3IONS-SCNC: 16.7 MMOL/L (ref 5–15)
AST SERPL-CCNC: 56 U/L (ref 1–32)
BACTERIA UR QL AUTO: ABNORMAL /HPF
BASOPHILS # BLD AUTO: 0.17 10*3/MM3 (ref 0–0.2)
BASOPHILS NFR BLD AUTO: 0.7 % (ref 0–1.5)
BILIRUB SERPL-MCNC: 0.5 MG/DL (ref 0–1.2)
BILIRUB UR QL STRIP: NEGATIVE
BUN SERPL-MCNC: 10 MG/DL (ref 6–20)
BUN/CREAT SERPL: 16.7 (ref 7–25)
CALCIUM SPEC-SCNC: 8.7 MG/DL (ref 8.6–10.5)
CHLORIDE SERPL-SCNC: 88 MMOL/L (ref 98–107)
CLARITY UR: ABNORMAL
CO2 SERPL-SCNC: 22.3 MMOL/L (ref 22–29)
COLOR UR: YELLOW
CREAT SERPL-MCNC: 0.6 MG/DL (ref 0.57–1)
CRP SERPL-MCNC: 20.86 MG/DL (ref 0–0.5)
D-LACTATE SERPL-SCNC: 1.5 MMOL/L (ref 0.5–2)
D-LACTATE SERPL-SCNC: 2.1 MMOL/L (ref 0.5–2)
DEPRECATED RDW RBC AUTO: 43.7 FL (ref 37–54)
EOSINOPHIL # BLD AUTO: 0.01 10*3/MM3 (ref 0–0.4)
EOSINOPHIL NFR BLD AUTO: 0 % (ref 0.3–6.2)
ERYTHROCYTE [DISTWIDTH] IN BLOOD BY AUTOMATED COUNT: 13.2 % (ref 12.3–15.4)
ERYTHROCYTE [SEDIMENTATION RATE] IN BLOOD: 99 MM/HR (ref 0–20)
GFR SERPL CREATININE-BSD FRML MDRD: 107 ML/MIN/1.73
GLOBULIN UR ELPH-MCNC: 4 GM/DL
GLUCOSE SERPL-MCNC: 248 MG/DL (ref 65–99)
GLUCOSE UR STRIP-MCNC: ABNORMAL MG/DL
HCT VFR BLD AUTO: 37.3 % (ref 34–46.6)
HGB BLD-MCNC: 12.8 G/DL (ref 12–15.9)
HGB UR QL STRIP.AUTO: ABNORMAL
HOLD SPECIMEN: NORMAL
HOLD SPECIMEN: NORMAL
HYALINE CASTS UR QL AUTO: ABNORMAL /LPF
IMM GRANULOCYTES # BLD AUTO: 1.25 10*3/MM3 (ref 0–0.05)
IMM GRANULOCYTES NFR BLD AUTO: 5.5 % (ref 0–0.5)
KETONES UR QL STRIP: NEGATIVE
LEUKOCYTE ESTERASE UR QL STRIP.AUTO: ABNORMAL
LYMPHOCYTES # BLD AUTO: 2.03 10*3/MM3 (ref 0.7–3.1)
LYMPHOCYTES NFR BLD AUTO: 8.9 % (ref 19.6–45.3)
MCH RBC QN AUTO: 31.1 PG (ref 26.6–33)
MCHC RBC AUTO-ENTMCNC: 34.3 G/DL (ref 31.5–35.7)
MCV RBC AUTO: 90.8 FL (ref 79–97)
MONOCYTES # BLD AUTO: 0.79 10*3/MM3 (ref 0.1–0.9)
MONOCYTES NFR BLD AUTO: 3.5 % (ref 5–12)
NEUTROPHILS NFR BLD AUTO: 18.53 10*3/MM3 (ref 1.7–7)
NEUTROPHILS NFR BLD AUTO: 81.4 % (ref 42.7–76)
NITRITE UR QL STRIP: POSITIVE
NRBC BLD AUTO-RTO: 0 /100 WBC (ref 0–0.2)
PH UR STRIP.AUTO: 7 [PH] (ref 5–8)
PLATELET # BLD AUTO: 295 10*3/MM3 (ref 140–450)
PMV BLD AUTO: 11.8 FL (ref 6–12)
POTASSIUM SERPL-SCNC: 3.2 MMOL/L (ref 3.5–5.2)
PROT SERPL-MCNC: 6.8 G/DL (ref 6–8.5)
PROT UR QL STRIP: NEGATIVE
RBC # BLD AUTO: 4.11 10*6/MM3 (ref 3.77–5.28)
RBC # UR: ABNORMAL /HPF
RBC MORPH BLD: NORMAL
REF LAB TEST METHOD: ABNORMAL
SMALL PLATELETS BLD QL SMEAR: ADEQUATE
SODIUM SERPL-SCNC: 127 MMOL/L (ref 136–145)
SP GR UR STRIP: 1.01 (ref 1–1.03)
SQUAMOUS #/AREA URNS HPF: ABNORMAL /HPF
T4 FREE SERPL-MCNC: 1.32 NG/DL (ref 0.93–1.7)
TOXIC GRANULATION: NORMAL
TSH SERPL DL<=0.05 MIU/L-ACNC: 1.45 UIU/ML (ref 0.27–4.2)
UROBILINOGEN UR QL STRIP: ABNORMAL
WBC # BLD AUTO: 22.78 10*3/MM3 (ref 3.4–10.8)
WBC UR QL AUTO: ABNORMAL /HPF
WHOLE BLOOD HOLD SPECIMEN: NORMAL
WHOLE BLOOD HOLD SPECIMEN: NORMAL

## 2021-10-13 PROCEDURE — 83605 ASSAY OF LACTIC ACID: CPT | Performed by: NURSE PRACTITIONER

## 2021-10-13 PROCEDURE — 36415 COLL VENOUS BLD VENIPUNCTURE: CPT

## 2021-10-13 PROCEDURE — 87147 CULTURE TYPE IMMUNOLOGIC: CPT | Performed by: EMERGENCY MEDICINE

## 2021-10-13 PROCEDURE — 96375 TX/PRO/DX INJ NEW DRUG ADDON: CPT

## 2021-10-13 PROCEDURE — 87086 URINE CULTURE/COLONY COUNT: CPT | Performed by: EMERGENCY MEDICINE

## 2021-10-13 PROCEDURE — 96365 THER/PROPH/DIAG IV INF INIT: CPT

## 2021-10-13 PROCEDURE — 85652 RBC SED RATE AUTOMATED: CPT | Performed by: NURSE PRACTITIONER

## 2021-10-13 PROCEDURE — 84439 ASSAY OF FREE THYROXINE: CPT | Performed by: NURSE PRACTITIONER

## 2021-10-13 PROCEDURE — 85007 BL SMEAR W/DIFF WBC COUNT: CPT | Performed by: EMERGENCY MEDICINE

## 2021-10-13 PROCEDURE — 99283 EMERGENCY DEPT VISIT LOW MDM: CPT

## 2021-10-13 PROCEDURE — 71045 X-RAY EXAM CHEST 1 VIEW: CPT

## 2021-10-13 PROCEDURE — 25010000002 CEFTRIAXONE PER 250 MG: Performed by: NURSE PRACTITIONER

## 2021-10-13 PROCEDURE — 25010000002 MORPHINE PER 10 MG: Performed by: EMERGENCY MEDICINE

## 2021-10-13 PROCEDURE — 87186 SC STD MICRODIL/AGAR DIL: CPT | Performed by: EMERGENCY MEDICINE

## 2021-10-13 PROCEDURE — 86140 C-REACTIVE PROTEIN: CPT | Performed by: NURSE PRACTITIONER

## 2021-10-13 PROCEDURE — 80053 COMPREHEN METABOLIC PANEL: CPT | Performed by: EMERGENCY MEDICINE

## 2021-10-13 PROCEDURE — 25010000002 KETOROLAC TROMETHAMINE PER 15 MG: Performed by: EMERGENCY MEDICINE

## 2021-10-13 PROCEDURE — 85025 COMPLETE CBC W/AUTO DIFF WBC: CPT | Performed by: EMERGENCY MEDICINE

## 2021-10-13 PROCEDURE — 84443 ASSAY THYROID STIM HORMONE: CPT | Performed by: NURSE PRACTITIONER

## 2021-10-13 PROCEDURE — 81001 URINALYSIS AUTO W/SCOPE: CPT | Performed by: EMERGENCY MEDICINE

## 2021-10-13 RX ORDER — KETOROLAC TROMETHAMINE 30 MG/ML
30 INJECTION, SOLUTION INTRAMUSCULAR; INTRAVENOUS ONCE
Status: DISCONTINUED | OUTPATIENT
Start: 2021-10-13 | End: 2021-10-13 | Stop reason: SDUPTHER

## 2021-10-13 RX ORDER — NITROFURANTOIN 25; 75 MG/1; MG/1
100 CAPSULE ORAL 2 TIMES DAILY
Qty: 14 CAPSULE | Refills: 0 | Status: ON HOLD | OUTPATIENT
Start: 2021-10-13 | End: 2021-11-03

## 2021-10-13 RX ORDER — KETOROLAC TROMETHAMINE 30 MG/ML
30 INJECTION, SOLUTION INTRAMUSCULAR; INTRAVENOUS ONCE
Status: COMPLETED | OUTPATIENT
Start: 2021-10-13 | End: 2021-10-13

## 2021-10-13 RX ORDER — SODIUM CHLORIDE 0.9 % (FLUSH) 0.9 %
10 SYRINGE (ML) INJECTION AS NEEDED
Status: DISCONTINUED | OUTPATIENT
Start: 2021-10-13 | End: 2021-10-13 | Stop reason: HOSPADM

## 2021-10-13 RX ORDER — CEFTRIAXONE SODIUM 1 G/50ML
1 INJECTION, SOLUTION INTRAVENOUS ONCE
Status: COMPLETED | OUTPATIENT
Start: 2021-10-13 | End: 2021-10-13

## 2021-10-13 RX ORDER — KETOROLAC TROMETHAMINE 10 MG/1
10 TABLET, FILM COATED ORAL EVERY 6 HOURS PRN
Qty: 15 TABLET | Refills: 0 | Status: SHIPPED | OUTPATIENT
Start: 2021-10-13 | End: 2021-11-04 | Stop reason: HOSPADM

## 2021-10-13 RX ADMIN — CEFTRIAXONE SODIUM 1 G: 1 INJECTION, SOLUTION INTRAVENOUS at 13:45

## 2021-10-13 RX ADMIN — MORPHINE SULFATE 4 MG: 4 INJECTION INTRAVENOUS at 12:34

## 2021-10-13 RX ADMIN — SODIUM CHLORIDE 1000 ML: 9 INJECTION, SOLUTION INTRAVENOUS at 13:45

## 2021-10-13 RX ADMIN — KETOROLAC TROMETHAMINE 30 MG: 30 INJECTION, SOLUTION INTRAMUSCULAR; INTRAVENOUS at 12:34

## 2021-10-13 RX ADMIN — SODIUM CHLORIDE 1000 ML: 9 INJECTION, SOLUTION INTRAVENOUS at 12:34

## 2021-10-15 LAB — BACTERIA SPEC AEROBE CULT: ABNORMAL

## 2021-10-19 RX ORDER — HYDROCODONE BITARTRATE AND ACETAMINOPHEN 5; 325 MG/1; MG/1
1 TABLET ORAL EVERY 6 HOURS PRN
Qty: 120 TABLET | Refills: 0 | Status: SHIPPED | OUTPATIENT
Start: 2021-10-19 | End: 2021-11-04 | Stop reason: HOSPADM

## 2021-10-22 DIAGNOSIS — A49.01 STAPH AUREUS INFECTION: Primary | ICD-10-CM

## 2021-10-28 DIAGNOSIS — K21.9 GASTROESOPHAGEAL REFLUX DISEASE, UNSPECIFIED WHETHER ESOPHAGITIS PRESENT: ICD-10-CM

## 2021-10-28 RX ORDER — OMEPRAZOLE 20 MG/1
20 CAPSULE, DELAYED RELEASE ORAL DAILY
Qty: 30 CAPSULE | Refills: 0 | OUTPATIENT
Start: 2021-10-28

## 2021-11-02 ENCOUNTER — HOSPITAL ENCOUNTER (INPATIENT)
Facility: HOSPITAL | Age: 51
LOS: 1 days | Discharge: HOME | End: 2021-11-04
Attending: EMERGENCY MEDICINE | Admitting: INTERNAL MEDICINE

## 2021-11-02 ENCOUNTER — APPOINTMENT (OUTPATIENT)
Dept: GENERAL RADIOLOGY | Facility: HOSPITAL | Age: 51
End: 2021-11-02

## 2021-11-02 ENCOUNTER — APPOINTMENT (OUTPATIENT)
Dept: CT IMAGING | Facility: HOSPITAL | Age: 51
End: 2021-11-02

## 2021-11-02 DIAGNOSIS — R19.5 OCCULT BLOOD IN STOOLS: ICD-10-CM

## 2021-11-02 DIAGNOSIS — E87.6 HYPOKALEMIA: ICD-10-CM

## 2021-11-02 DIAGNOSIS — Z78.9 DECREASED ACTIVITIES OF DAILY LIVING (ADL): ICD-10-CM

## 2021-11-02 DIAGNOSIS — R41.82 ALTERED MENTAL STATUS, UNSPECIFIED ALTERED MENTAL STATUS TYPE: Primary | ICD-10-CM

## 2021-11-02 DIAGNOSIS — E83.42 HYPOMAGNESEMIA: ICD-10-CM

## 2021-11-02 DIAGNOSIS — R26.2 DIFFICULTY WALKING: ICD-10-CM

## 2021-11-02 DIAGNOSIS — N30.00 ACUTE CYSTITIS WITHOUT HEMATURIA: ICD-10-CM

## 2021-11-02 DIAGNOSIS — E87.1 HYPONATREMIA: ICD-10-CM

## 2021-11-02 LAB
ALBUMIN SERPL-MCNC: 2.6 G/DL (ref 3.5–5.2)
ALBUMIN/GLOB SERPL: 0.6 G/DL
ALP SERPL-CCNC: 112 U/L (ref 39–117)
ALT SERPL W P-5'-P-CCNC: 42 U/L (ref 1–33)
ANION GAP SERPL CALCULATED.3IONS-SCNC: 14.7 MMOL/L (ref 5–15)
AST SERPL-CCNC: 24 U/L (ref 1–32)
BACTERIA UR QL AUTO: ABNORMAL /HPF
BASOPHILS # BLD AUTO: 0.05 10*3/MM3 (ref 0–0.2)
BASOPHILS NFR BLD AUTO: 0.3 % (ref 0–1.5)
BILIRUB SERPL-MCNC: 0.8 MG/DL (ref 0–1.2)
BILIRUB UR QL STRIP: NEGATIVE
BUN SERPL-MCNC: 7 MG/DL (ref 6–20)
BUN/CREAT SERPL: 14 (ref 7–25)
CALCIUM SPEC-SCNC: 8.2 MG/DL (ref 8.6–10.5)
CHLORIDE SERPL-SCNC: 87 MMOL/L (ref 98–107)
CLARITY UR: ABNORMAL
CO2 SERPL-SCNC: 21.3 MMOL/L (ref 22–29)
COLOR UR: YELLOW
CREAT SERPL-MCNC: 0.5 MG/DL (ref 0.57–1)
CRP SERPL-MCNC: 34.77 MG/DL (ref 0–0.5)
D-LACTATE SERPL-SCNC: 1.1 MMOL/L (ref 0.5–2)
DEPRECATED RDW RBC AUTO: 43.8 FL (ref 37–54)
EOSINOPHIL # BLD AUTO: 0.03 10*3/MM3 (ref 0–0.4)
EOSINOPHIL NFR BLD AUTO: 0.2 % (ref 0.3–6.2)
ERYTHROCYTE [DISTWIDTH] IN BLOOD BY AUTOMATED COUNT: 13.6 % (ref 12.3–15.4)
ERYTHROCYTE [SEDIMENTATION RATE] IN BLOOD: 96 MM/HR (ref 0–20)
ETHANOL BLD-MCNC: <10 MG/DL (ref 0–10)
ETHANOL UR QL: <0.01 %
GFR SERPL CREATININE-BSD FRML MDRD: 132 ML/MIN/1.73
GLOBULIN UR ELPH-MCNC: 4.2 GM/DL
GLUCOSE SERPL-MCNC: 114 MG/DL (ref 65–99)
GLUCOSE UR STRIP-MCNC: NEGATIVE MG/DL
HCT VFR BLD AUTO: 31 % (ref 34–46.6)
HEMOCCULT STL QL IA: POSITIVE
HGB BLD-MCNC: 10.6 G/DL (ref 12–15.9)
HGB UR QL STRIP.AUTO: NEGATIVE
HOLD SPECIMEN: NORMAL
HOLD SPECIMEN: NORMAL
HYALINE CASTS UR QL AUTO: ABNORMAL /LPF
IMM GRANULOCYTES # BLD AUTO: 0.75 10*3/MM3 (ref 0–0.05)
IMM GRANULOCYTES NFR BLD AUTO: 4.6 % (ref 0–0.5)
KETONES UR QL STRIP: NEGATIVE
LEUKOCYTE ESTERASE UR QL STRIP.AUTO: ABNORMAL
LYMPHOCYTES # BLD AUTO: 1.84 10*3/MM3 (ref 0.7–3.1)
LYMPHOCYTES NFR BLD AUTO: 11.3 % (ref 19.6–45.3)
MAGNESIUM SERPL-MCNC: 1.1 MG/DL (ref 1.6–2.6)
MCH RBC QN AUTO: 30 PG (ref 26.6–33)
MCHC RBC AUTO-ENTMCNC: 34.2 G/DL (ref 31.5–35.7)
MCV RBC AUTO: 87.8 FL (ref 79–97)
MONOCYTES # BLD AUTO: 1.41 10*3/MM3 (ref 0.1–0.9)
MONOCYTES NFR BLD AUTO: 8.7 % (ref 5–12)
NEUTROPHILS NFR BLD AUTO: 12.17 10*3/MM3 (ref 1.7–7)
NEUTROPHILS NFR BLD AUTO: 74.9 % (ref 42.7–76)
NITRITE UR QL STRIP: NEGATIVE
NRBC BLD AUTO-RTO: 0 /100 WBC (ref 0–0.2)
PH UR STRIP.AUTO: 7 [PH] (ref 5–8)
PLATELET # BLD AUTO: 347 10*3/MM3 (ref 140–450)
PMV BLD AUTO: 10.2 FL (ref 6–12)
POTASSIUM SERPL-SCNC: 2.7 MMOL/L (ref 3.5–5.2)
PROT SERPL-MCNC: 6.8 G/DL (ref 6–8.5)
PROT UR QL STRIP: ABNORMAL
RBC # BLD AUTO: 3.53 10*6/MM3 (ref 3.77–5.28)
RBC # UR: ABNORMAL /HPF
REF LAB TEST METHOD: ABNORMAL
SODIUM SERPL-SCNC: 123 MMOL/L (ref 136–145)
SP GR UR STRIP: 1.01 (ref 1–1.03)
SQUAMOUS #/AREA URNS HPF: ABNORMAL /HPF
TROPONIN T SERPL-MCNC: <0.01 NG/ML (ref 0–0.03)
UROBILINOGEN UR QL STRIP: ABNORMAL
WBC # BLD AUTO: 16.25 10*3/MM3 (ref 3.4–10.8)
WBC UR QL AUTO: ABNORMAL /HPF
WHOLE BLOOD HOLD SPECIMEN: NORMAL
WHOLE BLOOD HOLD SPECIMEN: NORMAL

## 2021-11-02 PROCEDURE — 25010000002 MAGNESIUM SULFATE 2 GM/50ML SOLUTION: Performed by: NURSE PRACTITIONER

## 2021-11-02 PROCEDURE — P9612 CATHETERIZE FOR URINE SPEC: HCPCS

## 2021-11-02 PROCEDURE — 71045 X-RAY EXAM CHEST 1 VIEW: CPT

## 2021-11-02 PROCEDURE — 83735 ASSAY OF MAGNESIUM: CPT | Performed by: EMERGENCY MEDICINE

## 2021-11-02 PROCEDURE — 99285 EMERGENCY DEPT VISIT HI MDM: CPT

## 2021-11-02 PROCEDURE — 84484 ASSAY OF TROPONIN QUANT: CPT | Performed by: EMERGENCY MEDICINE

## 2021-11-02 PROCEDURE — 93005 ELECTROCARDIOGRAM TRACING: CPT | Performed by: EMERGENCY MEDICINE

## 2021-11-02 PROCEDURE — 86140 C-REACTIVE PROTEIN: CPT | Performed by: EMERGENCY MEDICINE

## 2021-11-02 PROCEDURE — 82077 ASSAY SPEC XCP UR&BREATH IA: CPT | Performed by: EMERGENCY MEDICINE

## 2021-11-02 PROCEDURE — 80307 DRUG TEST PRSMV CHEM ANLYZR: CPT | Performed by: EMERGENCY MEDICINE

## 2021-11-02 PROCEDURE — 87086 URINE CULTURE/COLONY COUNT: CPT | Performed by: NURSE PRACTITIONER

## 2021-11-02 PROCEDURE — 87040 BLOOD CULTURE FOR BACTERIA: CPT | Performed by: NURSE PRACTITIONER

## 2021-11-02 PROCEDURE — 87186 SC STD MICRODIL/AGAR DIL: CPT | Performed by: NURSE PRACTITIONER

## 2021-11-02 PROCEDURE — 83605 ASSAY OF LACTIC ACID: CPT | Performed by: NURSE PRACTITIONER

## 2021-11-02 PROCEDURE — 87150 DNA/RNA AMPLIFIED PROBE: CPT | Performed by: NURSE PRACTITIONER

## 2021-11-02 PROCEDURE — 82274 ASSAY TEST FOR BLOOD FECAL: CPT | Performed by: EMERGENCY MEDICINE

## 2021-11-02 PROCEDURE — 85652 RBC SED RATE AUTOMATED: CPT | Performed by: EMERGENCY MEDICINE

## 2021-11-02 PROCEDURE — 70450 CT HEAD/BRAIN W/O DYE: CPT

## 2021-11-02 PROCEDURE — 80053 COMPREHEN METABOLIC PANEL: CPT | Performed by: EMERGENCY MEDICINE

## 2021-11-02 PROCEDURE — 81001 URINALYSIS AUTO W/SCOPE: CPT | Performed by: NURSE PRACTITIONER

## 2021-11-02 PROCEDURE — 85025 COMPLETE CBC W/AUTO DIFF WBC: CPT | Performed by: EMERGENCY MEDICINE

## 2021-11-02 PROCEDURE — 87147 CULTURE TYPE IMMUNOLOGIC: CPT | Performed by: NURSE PRACTITIONER

## 2021-11-02 PROCEDURE — 0 POTASSIUM CHLORIDE 10 MEQ/100ML SOLUTION: Performed by: NURSE PRACTITIONER

## 2021-11-02 RX ORDER — POTASSIUM CHLORIDE 7.45 MG/ML
10 INJECTION INTRAVENOUS
Status: COMPLETED | OUTPATIENT
Start: 2021-11-02 | End: 2021-11-03

## 2021-11-02 RX ORDER — SODIUM CHLORIDE 0.9 % (FLUSH) 0.9 %
10 SYRINGE (ML) INJECTION AS NEEDED
Status: DISCONTINUED | OUTPATIENT
Start: 2021-11-02 | End: 2021-11-04 | Stop reason: HOSPADM

## 2021-11-02 RX ORDER — MAGNESIUM SULFATE HEPTAHYDRATE 40 MG/ML
2 INJECTION, SOLUTION INTRAVENOUS ONCE
Status: COMPLETED | OUTPATIENT
Start: 2021-11-02 | End: 2021-11-03

## 2021-11-02 RX ORDER — CEFTRIAXONE SODIUM 1 G/50ML
1 INJECTION, SOLUTION INTRAVENOUS ONCE
Status: COMPLETED | OUTPATIENT
Start: 2021-11-03 | End: 2021-11-03

## 2021-11-02 RX ADMIN — POTASSIUM CHLORIDE 10 MEQ: 7.46 INJECTION, SOLUTION INTRAVENOUS at 23:39

## 2021-11-02 RX ADMIN — SODIUM CHLORIDE 1000 ML: 9 INJECTION, SOLUTION INTRAVENOUS at 22:25

## 2021-11-02 RX ADMIN — POTASSIUM CHLORIDE 10 MEQ: 7.46 INJECTION, SOLUTION INTRAVENOUS at 22:25

## 2021-11-02 RX ADMIN — MAGNESIUM SULFATE 2 G: 2 INJECTION INTRAVENOUS at 23:09

## 2021-11-03 ENCOUNTER — APPOINTMENT (OUTPATIENT)
Dept: CT IMAGING | Facility: HOSPITAL | Age: 51
End: 2021-11-03

## 2021-11-03 ENCOUNTER — APPOINTMENT (OUTPATIENT)
Dept: MRI IMAGING | Facility: HOSPITAL | Age: 51
End: 2021-11-03

## 2021-11-03 ENCOUNTER — APPOINTMENT (OUTPATIENT)
Dept: GENERAL RADIOLOGY | Facility: HOSPITAL | Age: 51
End: 2021-11-03

## 2021-11-03 PROBLEM — M25.552 HIP PAIN, BILATERAL: Status: ACTIVE | Noted: 2021-11-03

## 2021-11-03 PROBLEM — M25.551 HIP PAIN, BILATERAL: Status: ACTIVE | Noted: 2021-11-03

## 2021-11-03 PROBLEM — R78.81 BACTEREMIA DUE TO GRAM-POSITIVE BACTERIA: Status: ACTIVE | Noted: 2021-11-03

## 2021-11-03 PROBLEM — M54.40 ACUTE BILATERAL LOW BACK PAIN WITH SCIATICA: Status: ACTIVE | Noted: 2021-11-03

## 2021-11-03 PROBLEM — R41.82 ALTERED MENTAL STATUS: Status: ACTIVE | Noted: 2021-11-03

## 2021-11-03 PROBLEM — M06.9 RHEUMATOID ARTHRITIS INVOLVING MULTIPLE SITES (HCC): Status: ACTIVE | Noted: 2021-11-03

## 2021-11-03 PROBLEM — E87.6 HYPOKALEMIA: Status: ACTIVE | Noted: 2021-11-03

## 2021-11-03 PROBLEM — E43 SEVERE MALNUTRITION: Status: ACTIVE | Noted: 2021-11-03

## 2021-11-03 PROBLEM — R41.82 AMS (ALTERED MENTAL STATUS): Status: ACTIVE | Noted: 2021-11-03

## 2021-11-03 LAB
ALBUMIN SERPL-MCNC: 2.3 G/DL (ref 3.5–5.2)
ALBUMIN/GLOB SERPL: 0.6 G/DL
ALP SERPL-CCNC: 91 U/L (ref 39–117)
ALT SERPL W P-5'-P-CCNC: 36 U/L (ref 1–33)
AMPHET+METHAMPHET UR QL: NEGATIVE
ANION GAP SERPL CALCULATED.3IONS-SCNC: 13.8 MMOL/L (ref 5–15)
ANION GAP SERPL CALCULATED.3IONS-SCNC: 14.2 MMOL/L (ref 5–15)
AST SERPL-CCNC: 25 U/L (ref 1–32)
BACTERIA BLD CULT: ABNORMAL
BARBITURATES UR QL SCN: NEGATIVE
BASOPHILS # BLD AUTO: 0.05 10*3/MM3 (ref 0–0.2)
BASOPHILS NFR BLD AUTO: 0.4 % (ref 0–1.5)
BENZODIAZ UR QL SCN: NEGATIVE
BILIRUB SERPL-MCNC: 0.6 MG/DL (ref 0–1.2)
BUN SERPL-MCNC: 6 MG/DL (ref 6–20)
BUN SERPL-MCNC: 6 MG/DL (ref 6–20)
BUN/CREAT SERPL: 15.8 (ref 7–25)
BUN/CREAT SERPL: 16.2 (ref 7–25)
CALCIUM SPEC-SCNC: 7.4 MG/DL (ref 8.6–10.5)
CALCIUM SPEC-SCNC: 8.1 MG/DL (ref 8.6–10.5)
CANNABINOIDS SERPL QL: NEGATIVE
CHLORIDE SERPL-SCNC: 98 MMOL/L (ref 98–107)
CHLORIDE SERPL-SCNC: 98 MMOL/L (ref 98–107)
CHOLEST SERPL-MCNC: 75 MG/DL (ref 0–200)
CHROMATIN AB SERPL-ACNC: 12 IU/ML (ref 0–14)
CK SERPL-CCNC: 15 U/L (ref 20–180)
CO2 SERPL-SCNC: 18.8 MMOL/L (ref 22–29)
CO2 SERPL-SCNC: 19.2 MMOL/L (ref 22–29)
COCAINE UR QL: NEGATIVE
CREAT SERPL-MCNC: 0.37 MG/DL (ref 0.57–1)
CREAT SERPL-MCNC: 0.38 MG/DL (ref 0.57–1)
CRP SERPL-MCNC: 30.99 MG/DL (ref 0–0.5)
DEPRECATED RDW RBC AUTO: 43.6 FL (ref 37–54)
EOSINOPHIL # BLD AUTO: 0.03 10*3/MM3 (ref 0–0.4)
EOSINOPHIL NFR BLD AUTO: 0.2 % (ref 0.3–6.2)
ERYTHROCYTE [DISTWIDTH] IN BLOOD BY AUTOMATED COUNT: 13.5 % (ref 12.3–15.4)
ERYTHROCYTE [SEDIMENTATION RATE] IN BLOOD: 78 MM/HR (ref 0–20)
FERRITIN SERPL-MCNC: 394.4 NG/ML (ref 13–150)
FOLATE SERPL-MCNC: 2.47 NG/ML (ref 4.78–24.2)
GFR SERPL CREATININE-BSD FRML MDRD: >150 ML/MIN/1.73
GFR SERPL CREATININE-BSD FRML MDRD: >150 ML/MIN/1.73
GLOBULIN UR ELPH-MCNC: 3.8 GM/DL
GLUCOSE SERPL-MCNC: 100 MG/DL (ref 65–99)
GLUCOSE SERPL-MCNC: 114 MG/DL (ref 65–99)
HBA1C MFR BLD: 6.38 % (ref 4.8–5.6)
HCT VFR BLD AUTO: 27.6 % (ref 34–46.6)
HDLC SERPL-MCNC: 14 MG/DL (ref 40–60)
HGB BLD-MCNC: 9.3 G/DL (ref 12–15.9)
IMM GRANULOCYTES # BLD AUTO: 0.59 10*3/MM3 (ref 0–0.05)
IMM GRANULOCYTES NFR BLD AUTO: 4.4 % (ref 0–0.5)
INR PPP: 1.01 (ref 2–3)
INR PPP: 1.07 (ref 2–3)
IRON 24H UR-MRATE: 18 MCG/DL (ref 37–145)
IRON SATN MFR SERPL: 10 % (ref 20–50)
LDH SERPL-CCNC: 223 U/L (ref 135–214)
LDLC SERPL CALC-MCNC: 41 MG/DL (ref 0–100)
LDLC/HDLC SERPL: 2.87 {RATIO}
LYMPHOCYTES # BLD AUTO: 1.4 10*3/MM3 (ref 0.7–3.1)
LYMPHOCYTES NFR BLD AUTO: 10.6 % (ref 19.6–45.3)
MAGNESIUM SERPL-MCNC: 1.9 MG/DL (ref 1.6–2.6)
MAGNESIUM SERPL-MCNC: 1.9 MG/DL (ref 1.6–2.6)
MCH RBC QN AUTO: 29.5 PG (ref 26.6–33)
MCHC RBC AUTO-ENTMCNC: 33.7 G/DL (ref 31.5–35.7)
MCV RBC AUTO: 87.6 FL (ref 79–97)
METHADONE UR QL SCN: NEGATIVE
MONOCYTES # BLD AUTO: 1.16 10*3/MM3 (ref 0.1–0.9)
MONOCYTES NFR BLD AUTO: 8.7 % (ref 5–12)
NEUTROPHILS NFR BLD AUTO: 10.03 10*3/MM3 (ref 1.7–7)
NEUTROPHILS NFR BLD AUTO: 75.7 % (ref 42.7–76)
NRBC BLD AUTO-RTO: 0 /100 WBC (ref 0–0.2)
OPIATES UR QL: NEGATIVE
OXYCODONE UR QL SCN: NEGATIVE
PHOSPHATE SERPL-MCNC: 2.8 MG/DL (ref 2.5–4.5)
PHOSPHATE SERPL-MCNC: 3 MG/DL (ref 2.5–4.5)
PLATELET # BLD AUTO: 313 10*3/MM3 (ref 140–450)
PMV BLD AUTO: 9.9 FL (ref 6–12)
POTASSIUM SERPL-SCNC: 3.2 MMOL/L (ref 3.5–5.2)
POTASSIUM SERPL-SCNC: 3.3 MMOL/L (ref 3.5–5.2)
PROT SERPL-MCNC: 6.1 G/DL (ref 6–8.5)
PROTHROMBIN TIME: 11.1 SECONDS (ref 9.4–12)
PROTHROMBIN TIME: 11.6 SECONDS (ref 9.4–12)
QT INTERVAL: 326 MS
RBC # BLD AUTO: 3.15 10*6/MM3 (ref 3.77–5.28)
SODIUM SERPL-SCNC: 131 MMOL/L (ref 136–145)
SODIUM SERPL-SCNC: 131 MMOL/L (ref 136–145)
TIBC SERPL-MCNC: 180 MCG/DL (ref 298–536)
TRANSFERRIN SERPL-MCNC: 121 MG/DL (ref 200–360)
TRIGL SERPL-MCNC: 104 MG/DL (ref 0–150)
URATE SERPL-MCNC: 4.5 MG/DL (ref 2.4–5.7)
VLDLC SERPL-MCNC: 20 MG/DL (ref 5–40)
WBC # BLD AUTO: 13.26 10*3/MM3 (ref 3.4–10.8)

## 2021-11-03 PROCEDURE — 63710000001 PREDNISONE PER 1 MG: Performed by: INTERNAL MEDICINE

## 2021-11-03 PROCEDURE — 36415 COLL VENOUS BLD VENIPUNCTURE: CPT | Performed by: GENERAL PRACTICE

## 2021-11-03 PROCEDURE — 85025 COMPLETE CBC W/AUTO DIFF WBC: CPT | Performed by: GENERAL PRACTICE

## 2021-11-03 PROCEDURE — 84550 ASSAY OF BLOOD/URIC ACID: CPT | Performed by: INTERNAL MEDICINE

## 2021-11-03 PROCEDURE — 84466 ASSAY OF TRANSFERRIN: CPT | Performed by: GENERAL PRACTICE

## 2021-11-03 PROCEDURE — 72158 MRI LUMBAR SPINE W/O & W/DYE: CPT

## 2021-11-03 PROCEDURE — 83036 HEMOGLOBIN GLYCOSYLATED A1C: CPT | Performed by: GENERAL PRACTICE

## 2021-11-03 PROCEDURE — 25010000002 KETOROLAC TROMETHAMINE PER 15 MG: Performed by: INTERNAL MEDICINE

## 2021-11-03 PROCEDURE — 97530 THERAPEUTIC ACTIVITIES: CPT

## 2021-11-03 PROCEDURE — 85610 PROTHROMBIN TIME: CPT | Performed by: GENERAL PRACTICE

## 2021-11-03 PROCEDURE — 72197 MRI PELVIS W/O & W/DYE: CPT

## 2021-11-03 PROCEDURE — 82550 ASSAY OF CK (CPK): CPT | Performed by: GENERAL PRACTICE

## 2021-11-03 PROCEDURE — 0 GADOBENATE DIMEGLUMINE 529 MG/ML SOLUTION: Performed by: INTERNAL MEDICINE

## 2021-11-03 PROCEDURE — 83735 ASSAY OF MAGNESIUM: CPT | Performed by: GENERAL PRACTICE

## 2021-11-03 PROCEDURE — 83540 ASSAY OF IRON: CPT | Performed by: GENERAL PRACTICE

## 2021-11-03 PROCEDURE — 25010000002 CEFTRIAXONE PER 250 MG: Performed by: NURSE PRACTITIONER

## 2021-11-03 PROCEDURE — 86431 RHEUMATOID FACTOR QUANT: CPT | Performed by: INTERNAL MEDICINE

## 2021-11-03 PROCEDURE — 85652 RBC SED RATE AUTOMATED: CPT | Performed by: GENERAL PRACTICE

## 2021-11-03 PROCEDURE — 82746 ASSAY OF FOLIC ACID SERUM: CPT | Performed by: GENERAL PRACTICE

## 2021-11-03 PROCEDURE — 0 CEFAZOLIN IN DEXTROSE 2-4 GM/100ML-% SOLUTION: Performed by: INTERNAL MEDICINE

## 2021-11-03 PROCEDURE — 83615 LACTATE (LD) (LDH) ENZYME: CPT | Performed by: GENERAL PRACTICE

## 2021-11-03 PROCEDURE — 80061 LIPID PANEL: CPT | Performed by: GENERAL PRACTICE

## 2021-11-03 PROCEDURE — 84100 ASSAY OF PHOSPHORUS: CPT | Performed by: GENERAL PRACTICE

## 2021-11-03 PROCEDURE — 97165 OT EVAL LOW COMPLEX 30 MIN: CPT

## 2021-11-03 PROCEDURE — 99223 1ST HOSP IP/OBS HIGH 75: CPT | Performed by: GENERAL PRACTICE

## 2021-11-03 PROCEDURE — 80053 COMPREHEN METABOLIC PANEL: CPT | Performed by: GENERAL PRACTICE

## 2021-11-03 PROCEDURE — 97161 PT EVAL LOW COMPLEX 20 MIN: CPT

## 2021-11-03 PROCEDURE — 82728 ASSAY OF FERRITIN: CPT | Performed by: GENERAL PRACTICE

## 2021-11-03 PROCEDURE — 0 POTASSIUM CHLORIDE 10 MEQ/100ML SOLUTION: Performed by: NURSE PRACTITIONER

## 2021-11-03 PROCEDURE — 73120 X-RAY EXAM OF HAND: CPT

## 2021-11-03 PROCEDURE — A9577 INJ MULTIHANCE: HCPCS | Performed by: INTERNAL MEDICINE

## 2021-11-03 PROCEDURE — 86140 C-REACTIVE PROTEIN: CPT | Performed by: GENERAL PRACTICE

## 2021-11-03 PROCEDURE — 86200 CCP ANTIBODY: CPT | Performed by: INTERNAL MEDICINE

## 2021-11-03 RX ORDER — HYDROCODONE BITARTRATE AND ACETAMINOPHEN 5; 325 MG/1; MG/1
1 TABLET ORAL EVERY 4 HOURS PRN
Status: DISCONTINUED | OUTPATIENT
Start: 2021-11-03 | End: 2021-11-04 | Stop reason: HOSPADM

## 2021-11-03 RX ORDER — PREDNISONE 20 MG/1
40 TABLET ORAL DAILY
Status: DISCONTINUED | OUTPATIENT
Start: 2021-11-03 | End: 2021-11-04 | Stop reason: HOSPADM

## 2021-11-03 RX ORDER — SENNOSIDES 8.6 MG
650 CAPSULE ORAL EVERY 8 HOURS PRN
Status: DISCONTINUED | OUTPATIENT
Start: 2021-11-03 | End: 2021-11-04 | Stop reason: HOSPADM

## 2021-11-03 RX ORDER — BISACODYL 10 MG
10 SUPPOSITORY, RECTAL RECTAL DAILY PRN
Status: DISCONTINUED | OUTPATIENT
Start: 2021-11-03 | End: 2021-11-04 | Stop reason: HOSPADM

## 2021-11-03 RX ORDER — ONDANSETRON 4 MG/1
4 TABLET, FILM COATED ORAL EVERY 6 HOURS PRN
Status: DISCONTINUED | OUTPATIENT
Start: 2021-11-03 | End: 2021-11-04 | Stop reason: HOSPADM

## 2021-11-03 RX ORDER — CYANOCOBALAMIN 1000 UG/ML
100 INJECTION, SOLUTION INTRAMUSCULAR; SUBCUTANEOUS DAILY
Status: CANCELLED | OUTPATIENT
Start: 2021-11-03

## 2021-11-03 RX ORDER — POLYETHYLENE GLYCOL 3350 17 G/17G
17 POWDER, FOR SOLUTION ORAL DAILY PRN
Status: DISCONTINUED | OUTPATIENT
Start: 2021-11-03 | End: 2021-11-04 | Stop reason: HOSPADM

## 2021-11-03 RX ORDER — AMOXICILLIN 250 MG
2 CAPSULE ORAL 2 TIMES DAILY
Status: DISCONTINUED | OUTPATIENT
Start: 2021-11-03 | End: 2021-11-04 | Stop reason: HOSPADM

## 2021-11-03 RX ORDER — HYDROCODONE BITARTRATE AND ACETAMINOPHEN 5; 325 MG/1; MG/1
1 TABLET ORAL EVERY 6 HOURS PRN
Status: CANCELLED | OUTPATIENT
Start: 2021-11-03

## 2021-11-03 RX ORDER — SENNOSIDES 8.6 MG
650 CAPSULE ORAL EVERY 8 HOURS PRN
Status: CANCELLED | OUTPATIENT
Start: 2021-11-03

## 2021-11-03 RX ORDER — HYDROCODONE BITARTRATE AND ACETAMINOPHEN 5; 325 MG/1; MG/1
2 TABLET ORAL EVERY 4 HOURS PRN
Status: DISCONTINUED | OUTPATIENT
Start: 2021-11-03 | End: 2021-11-04 | Stop reason: HOSPADM

## 2021-11-03 RX ORDER — CYCLOBENZAPRINE HCL 10 MG
10 TABLET ORAL 3 TIMES DAILY PRN
Status: CANCELLED | OUTPATIENT
Start: 2021-11-03

## 2021-11-03 RX ORDER — BISACODYL 5 MG/1
5 TABLET, DELAYED RELEASE ORAL DAILY PRN
Status: DISCONTINUED | OUTPATIENT
Start: 2021-11-03 | End: 2021-11-04 | Stop reason: HOSPADM

## 2021-11-03 RX ORDER — CEFAZOLIN SODIUM 2 G/100ML
2 INJECTION, SOLUTION INTRAVENOUS EVERY 8 HOURS
Status: DISCONTINUED | OUTPATIENT
Start: 2021-11-03 | End: 2021-11-03

## 2021-11-03 RX ORDER — POTASSIUM CHLORIDE 750 MG/1
40 CAPSULE, EXTENDED RELEASE ORAL ONCE
Status: COMPLETED | OUTPATIENT
Start: 2021-11-03 | End: 2021-11-03

## 2021-11-03 RX ORDER — SODIUM CHLORIDE 0.9 % (FLUSH) 0.9 %
10 SYRINGE (ML) INJECTION EVERY 12 HOURS SCHEDULED
Status: DISCONTINUED | OUTPATIENT
Start: 2021-11-03 | End: 2021-11-03

## 2021-11-03 RX ORDER — SODIUM CHLORIDE 0.9 % (FLUSH) 0.9 %
10 SYRINGE (ML) INJECTION AS NEEDED
Status: DISCONTINUED | OUTPATIENT
Start: 2021-11-03 | End: 2021-11-04 | Stop reason: HOSPADM

## 2021-11-03 RX ORDER — KETOROLAC TROMETHAMINE 30 MG/ML
30 INJECTION, SOLUTION INTRAMUSCULAR; INTRAVENOUS EVERY 6 HOURS PRN
Status: DISCONTINUED | OUTPATIENT
Start: 2021-11-03 | End: 2021-11-04 | Stop reason: HOSPADM

## 2021-11-03 RX ADMIN — KETOROLAC TROMETHAMINE 30 MG: 30 INJECTION, SOLUTION INTRAMUSCULAR; INTRAVENOUS at 14:23

## 2021-11-03 RX ADMIN — Medication 10 ML: at 09:21

## 2021-11-03 RX ADMIN — CEFTRIAXONE SODIUM 1 G: 1 INJECTION, SOLUTION INTRAVENOUS at 03:31

## 2021-11-03 RX ADMIN — HYDROCODONE BITARTRATE AND ACETAMINOPHEN 1 TABLET: 5; 325 TABLET ORAL at 18:52

## 2021-11-03 RX ADMIN — SODIUM CHLORIDE 1000 ML: 9 INJECTION, SOLUTION INTRAVENOUS at 00:49

## 2021-11-03 RX ADMIN — HYDROCODONE BITARTRATE AND ACETAMINOPHEN 1 TABLET: 5; 325 TABLET ORAL at 14:23

## 2021-11-03 RX ADMIN — POTASSIUM CHLORIDE 10 MEQ: 7.46 INJECTION, SOLUTION INTRAVENOUS at 01:22

## 2021-11-03 RX ADMIN — HYDROCODONE BITARTRATE AND ACETAMINOPHEN 1 TABLET: 5; 325 TABLET ORAL at 09:32

## 2021-11-03 RX ADMIN — CEFAZOLIN SODIUM 2 G: 2 INJECTION, SOLUTION INTRAVENOUS at 23:04

## 2021-11-03 RX ADMIN — POTASSIUM CHLORIDE 10 MEQ: 7.46 INJECTION, SOLUTION INTRAVENOUS at 02:14

## 2021-11-03 RX ADMIN — GADOBENATE DIMEGLUMINE 10 ML: 529 INJECTION, SOLUTION INTRAVENOUS at 20:37

## 2021-11-03 RX ADMIN — CEFAZOLIN SODIUM 2 G: 2 INJECTION, SOLUTION INTRAVENOUS at 15:16

## 2021-11-03 RX ADMIN — HYDROCODONE BITARTRATE AND ACETAMINOPHEN 2 TABLET: 5; 325 TABLET ORAL at 23:04

## 2021-11-03 RX ADMIN — POTASSIUM CHLORIDE 40 MEQ: 750 CAPSULE, EXTENDED RELEASE ORAL at 09:20

## 2021-11-03 RX ADMIN — PREDNISONE 40 MG: 20 TABLET ORAL at 13:16

## 2021-11-03 NOTE — ED PROVIDER NOTES
Subjective   The patient presents to the emergency department and is unable to answer any questions.  She is completely altered and unable to speak clearly enough for me to understand what she is saying.  Her complaint initially when she came in was overall pain due to her rheumatoid arthritis.  There is no one at the bedside to speak with about her concerns.  She was found to have several abnormal labs on her blood work.          Review of Systems   Constitutional: Positive for fatigue. Negative for chills and fever.   HENT: Negative for congestion, ear pain and sore throat.    Eyes: Negative for pain.   Respiratory: Negative for cough, chest tightness and shortness of breath.    Cardiovascular: Negative for chest pain.   Gastrointestinal: Negative for abdominal pain, diarrhea, nausea and vomiting.   Genitourinary: Negative for flank pain and hematuria.   Musculoskeletal: Positive for gait problem. Negative for joint swelling.   Skin: Negative for pallor.   Neurological: Positive for weakness. Negative for seizures and headaches.   All other systems reviewed and are negative.      Past Medical History:   Diagnosis Date   • Shore's esophagus    • Extremity pain    • GERD (gastroesophageal reflux disease)    • Hiatal hernia    • IBS (irritable bowel syndrome)    • Joint pain    • Leg pain    • Low back pain    • Osteoarthritis    • Ovarian cyst    • Peripheral neuropathy    • Sleep apnea    • Vitamin B12 deficiency    • Vitamin D deficiency        Allergies   Allergen Reactions   • Latex Itching   • Adhesive Tape Rash       Past Surgical History:   Procedure Laterality Date   • CARPAL TUNNEL RELEASE Bilateral    • CHOLECYSTECTOMY     • COLONOSCOPY     • COLONOSCOPY N/A 6/30/2021    Procedure: COLONOSCOPY;  Surgeon: Lee Garcia MD;  Location: [facility] ENDOSCOPY;  Service: Gastroenterology;  Laterality: N/A;  COLON POLYPS   • ENDOSCOPY     • ENDOSCOPY N/A 6/30/2021    Procedure: ESOPHAGOGASTRODUODENOSCOPY;   Surgeon: Lee Garcia MD;  Location: [facility] ENDOSCOPY;  Service: Gastroenterology;  Laterality: N/A;  BARRETTS ESOPH/HIATAL HERNIA   • FOOT SURGERY Left    • HYSTERECTOMY     • OOPHORECTOMY Bilateral    • SACROILIAC JOINT INJECTION Bilateral 4/8/2021    Procedure: SACROILIAC INJECTION;  Surgeon: Epifanio Plaza MD;  Location: [facility] MAIN OR;  Service: Pain Management;  Laterality: Bilateral;   • SACROILIAC JOINT INJECTION Bilateral 10/5/2021    Procedure: SACROILIAC INJECTION;  Surgeon: Epifanio Plaza MD;  Location: [facility] MAIN OR;  Service: Pain Management;  Laterality: Bilateral;   • ULNAR NERVE TRANSPOSITION Bilateral        Family History   Problem Relation Age of Onset   • Arthritis Mother    • Diabetes Mother    • Hypertension Mother    • Other Mother         uterine cyst   • Ovarian cysts Sister    • Diabetes Brother    • Hypertension Daughter    • Arthritis Maternal Grandmother    • Diabetes Maternal Grandmother    • Hypertension Maternal Grandmother    • Other Maternal Grandmother         uterine cyst   • Arthritis Maternal Grandfather    • Malig Hyperthermia Neg Hx        Social History     Socioeconomic History   • Marital status:    Tobacco Use   • Smoking status: Light Tobacco Smoker     Packs/day: 0.25     Years: 25.00     Pack years: 6.25   • Smokeless tobacco: Never Used   Vaping Use   • Vaping Use: Never used   Substance and Sexual Activity   • Alcohol use: No   • Drug use: No   • Sexual activity: Defer           Objective   Physical Exam  Vitals and nursing note reviewed.   Constitutional:       General: She is not in acute distress.     Appearance: She is ill-appearing. She is not toxic-appearing.   HENT:      Head: Normocephalic and atraumatic.      Mouth/Throat:      Mouth: Mucous membranes are dry.   Eyes:      General: No scleral icterus.     Pupils: Pupils are equal, round, and reactive to light.   Cardiovascular:      Rate and Rhythm: Regular rhythm. Tachycardia present.       Pulses: Normal pulses.   Pulmonary:      Effort: Pulmonary effort is normal. No respiratory distress.      Breath sounds: Normal breath sounds.   Abdominal:      General: Abdomen is flat.      Palpations: Abdomen is soft.      Tenderness: There is no abdominal tenderness. There is no guarding or rebound.   Musculoskeletal:         General: Normal range of motion.      Cervical back: Normal range of motion and neck supple.   Skin:     General: Skin is warm and dry.      Capillary Refill: Capillary refill takes less than 2 seconds.   Neurological:      Mental Status: She is disoriented.      Motor: Weakness present.         Procedures           ED Course  ED Course as of 11/03/21 0102   Wed Nov 03, 2021   0043 I went in to reevaluate the patient at this time and she is awake she is talking and states that she had called the ambulance to come to the hospital because she had woke up today and was unable to ambulate.  She states that she had not been able to get around all day due to overall weakness.  She appears to be alert and oriented at this time.  She denies any recent nausea or vomiting.  She states she has been able to eat and drink recently.  She does state that she received her first Covid vaccine 1 October and states that she has not felt well since then.  She denies any recent head trauma.  She states she has had no recent fevers.  She states she has had no nausea vomiting or diarrhea.  Denies any abdominal pain or tenderness with palpation.  Patient will doze off during the conversation but awakes easily at this time. [TC]   0101 I SPOKE WITH DR CARMEN AND SHE WILL ADMIT TO MEDICINE. THE PT HAS BEEN MADE AWARE OF THE ADMISSION. [TC]      ED Course User Index  [TC] Karolyn Dominguez, MIGUEL                                           MDM  Number of Diagnoses or Management Options  Acute cystitis without hematuria: new and requires workup  Altered mental status, unspecified altered mental status type: new and  requires workup  Hypokalemia: new and requires workup  Hypomagnesemia: new and requires workup  Hyponatremia: established and worsening  Occult blood in stools: new and requires workup     Amount and/or Complexity of Data Reviewed  Clinical lab tests: reviewed  Tests in the radiology section of CPT®: reviewed  Tests in the medicine section of CPT®: reviewed  Decide to obtain previous medical records or to obtain history from someone other than the patient: yes    Risk of Complications, Morbidity, and/or Mortality  Presenting problems: moderate  Diagnostic procedures: moderate  Management options: moderate    Patient Progress  Patient progress: stable      Final diagnoses:   Altered mental status, unspecified altered mental status type   Acute cystitis without hematuria   Hyponatremia   Hypokalemia   Hypomagnesemia   Occult blood in stools       ED Disposition  ED Disposition     ED Disposition Condition Comment    Decision to Admit            No follow-up provider specified.       Medication List      No changes were made to your prescriptions during this visit.          Karolyn Dominguez, APRN  11/03/21 0102

## 2021-11-03 NOTE — CONSULTS
"Nutrition Services    Patient Name: Ana Maria Story  YOB: 1970  MRN: [id number]  Admission date: 11/2/2021      CLINICAL NUTRITION ASSESSMENT      Reason for Assessment  MST score 2+     H&P:    Past Medical History:   Diagnosis Date   • Shore's esophagus    • Extremity pain    • GERD (gastroesophageal reflux disease)    • Hiatal hernia    • IBS (irritable bowel syndrome)    • Joint pain    • Leg pain    • Low back pain    • Osteoarthritis    • Ovarian cyst    • Peripheral neuropathy    • Sleep apnea    • Vitamin B12 deficiency    • Vitamin D deficiency         Current Problems:   Active Hospital Problems    Diagnosis    • Acute bilateral low back pain with sciatica         Nutrition/Diet History         Narrative     Patient reports history of B12 deficiency dating back 20 years.  Patient takes self-administered B12 shots regularly.  Patient reports history of IBS-D.  Patient reports decreased p.o. intake x1 month patient complains of weakness, inability to stand to prepare meals.  Patient states she has been eating sandwiches etc. for foods easily prepared for her.  Her [employer] family has been helping. Intake 50% or less.        Anthropometrics        Current Height, Weight Height: 172.7 cm (68\")  Weight: 58.9 kg (129 lb 13.6 oz)   Current BMI Body mass index is 19.74 kg/m².       Weight Hx  Wt Readings from Last 30 Encounters:   11/03/21 0400 58.9 kg (129 lb 13.6 oz)   11/02/21 1956 63.2 kg (139 lb 5.3 oz)   10/13/21 1212 63.2 kg (139 lb 5.3 oz)   09/21/21 0917 64.4 kg (142 lb)   07/21/21 0912 65.5 kg (144 lb 6.4 oz)   06/30/21 0639 66.6 kg (146 lb 13.2 oz)   06/24/21 1522 66.2 kg (146 lb)   05/24/21 0904 67.6 kg (149 lb)   05/18/21 0000 61.3 kg (135 lb 2 oz)   04/12/21 0000 65.8 kg (145 lb)   04/08/21 1236 65.8 kg (145 lb)   03/23/21 0922 67.6 kg (149 lb)   01/22/21 0847 65.3 kg (144 lb)   11/23/20 0852 67.9 kg (149 lb 9.6 oz)   07/27/20 1455 65.4 kg (144 lb 3.2 oz)   05/28/20 1012 67.1 kg " (148 lb)   02/03/20 0938 68.2 kg (150 lb 6.4 oz)   12/05/19 0925 64.6 kg (142 lb 6.4 oz)   10/08/19 0854 67.3 kg (148 lb 6.4 oz)   08/06/19 0836 67.1 kg (148 lb)   07/08/19 0902 68.9 kg (152 lb)   05/07/19 0918 68.5 kg (151 lb)   03/07/19 0922 68.6 kg (151 lb 3.2 oz)   01/08/19 0842 67.7 kg (149 lb 3.2 oz)   11/08/18 0852 68.9 kg (152 lb)   09/13/18 0813 70.3 kg (155 lb)   07/18/18 0856 69.9 kg (154 lb)   05/23/18 0835 71.6 kg (157 lb 12.8 oz)   03/22/18 1104 70.8 kg (156 lb)   01/18/18 1043 70.1 kg (154 lb 9.6 oz)   11/22/17 1056 70.6 kg (155 lb 9.6 oz)   09/25/17 1049 68.9 kg (151 lb 12.8 oz)            Wt Change Observation 8.5% wt loss/2 month     Malnutrition Severity Assessment      Patient meets criteria for : Severe Malnutrition  Malnutrition Type (last 8 hours)     Malnutrition Severity Assessment     Row Name 11/03/21 1431       Malnutrition Severity Assessment    Malnutrition Type Chronic Disease - Related Malnutrition    Row Name 11/03/21 1431       Insufficient Energy Intake     Insufficient Energy Intake Findings Moderate    Insufficient Energy Intake  <50% of est. energy requirement for >or equal to 1 month    Row Name 11/03/21 1431       Unintentional Weight Loss     Unintentional Weight Loss Findings Severe    Unintentional Weight Loss  Weight loss greater than 5% in one month    Row Name 11/03/21 1431       Muscle Loss    Loss of Muscle Mass Findings Severe    Blessing Region Moderate - slight depression    Clavicle Bone Region Moderate - some protrusion in females, visible in males    Acromion Bone Region Moderate - acromion may slightly protrude    Scapular Bone Region Moderate - mild depression, bones may show slightly    Dorsal Hand Region Moderate - slight depression    Patellar Region Severe - prominent bone, square looking, very little muscle definition    Anterior Thigh Region Moderate - mild depression on inner thigh    Posterior Calf Region Severe - thin with very little definition/firmness     Row Name 11/03/21 1431       Criteria Met (Must meet criteria for severity in at least 2 of these categories: M Wasting, Fat Loss, Fluid, Secondary Signs, Wt. Status, Intake)    Patient meets criteria for  Severe Malnutrition                     Estimated/Assessed Needs       Energy Requirements    EST Needs (kcal/day) 2000kcal       Protein Requirements    EST Daily Needs (g/day) 64-77g       Fluid Requirements     Estimated Needs (mL/day) 2000ml     Labs/Medications         Pertinent Labs Reviewed.   Results from last 7 days   Lab Units 11/03/21  0501 11/03/21 0138 11/02/21 2051   SODIUM mmol/L 131* 131* 123*   POTASSIUM mmol/L 3.2* 3.3* 2.7*   CHLORIDE mmol/L 98 98 87*   CO2 mmol/L 18.8* 19.2* 21.3*   BUN mg/dL 6 6 7   CREATININE mg/dL 0.38* 0.37* 0.50*   CALCIUM mg/dL 8.1* 7.4* 8.2*   BILIRUBIN mg/dL 0.6  --  0.8   ALK PHOS U/L 91  --  112   ALT (SGPT) U/L 36*  --  42*   AST (SGOT) U/L 25  --  24   GLUCOSE mg/dL 100* 114* 114*     Results from last 7 days   Lab Units 11/03/21  0501 11/03/21 0138 11/03/21 0138 11/02/21 2051 11/02/21 2051   MAGNESIUM mg/dL 1.9  --  1.9  --  1.1*   PHOSPHORUS mg/dL 2.8   < > 3.0  --   --    HEMOGLOBIN g/dL  --   --  9.3*   < > 10.6*   HEMATOCRIT %  --   --  27.6*   < > 31.0*   TRIGLYCERIDES mg/dL 104  --   --   --   --     < > = values in this interval not displayed.     COVID19   Date Value Ref Range Status   10/02/2021 Not Detected Not Detected - Ref. Range Final     Lab Results   Component Value Date    HGBA1C 6.38 (H) 11/03/2021         Pertinent Medications Reviewed.     Current Nutrition Orders & Evaluation of Intake       Oral Nutrition     Current PO Diet Diet Regular; Consistent Carbohydrate   Supplement No active supplement orders       Nutrition Diagnosis         Nutrition Dx Problem 1 Inadequate oral Intake related to decreased ability to consume sufficient energy as evidenced by decreased appetite., unintended wt change., body composition changes. and possible  increased nutrient losses/malabsorption, iron deficiency, folate deficiency       Nutrition Intervention         Ensure Enlive (20g protein 350kcal) 3 times daily  Recommend MVI/folate ? B12     Medical Nutrition Therapy/Nutrition Education          Learner     Readiness Patient  Acceptance     Method     Response Explanation  Verbalizes understanding     Monitor/Evaluation        Monitor PO intake, Supplement intake       Nutrition Discharge Plan         To be determined       Electronically signed by:  Tabatha Lora RD  11/03/21 10:35 EDT

## 2021-11-03 NOTE — THERAPY EVALUATION
Acute Care - Physical Therapy Initial Evaluation   [facility]     Patient Name: Ana Maria Story  : 1970  MRN: [id number]  Today's Date: 11/3/2021      Visit Dx:     ICD-10-CM ICD-9-CM   1. Altered mental status, unspecified altered mental status type  R41.82 780.97   2. Acute cystitis without hematuria  N30.00 595.0   3. Hyponatremia  E87.1 276.1   4. Hypokalemia  E87.6 276.8   5. Hypomagnesemia  E83.42 275.2   6. Occult blood in stools  R19.5 792.1   7. Difficulty walking  R26.2 719.7     Patient Active Problem List   Diagnosis   • Disorder of peripheral nervous system   • B12 neuropathy (HCC)   • Chronic pain syndrome   • Encounter for long-term (current) use of medications   • Sacroiliac joint dysfunction of both sides   • Pain in extremity   • SI (sacroiliac) joint dysfunction   • Descending colon polyp   • Acute bilateral low back pain with sciatica   • Rheumatoid arthritis involving multiple sites (HCC)   • Hip pain, bilateral   • Hypokalemia     Past Medical History:   Diagnosis Date   • Shore's esophagus    • Extremity pain    • GERD (gastroesophageal reflux disease)    • Hiatal hernia    • IBS (irritable bowel syndrome)    • Joint pain    • Leg pain    • Low back pain    • Osteoarthritis    • Ovarian cyst    • Peripheral neuropathy    • Sleep apnea    • Vitamin B12 deficiency    • Vitamin D deficiency      Past Surgical History:   Procedure Laterality Date   • CARPAL TUNNEL RELEASE Bilateral    • CHOLECYSTECTOMY     • COLONOSCOPY     • COLONOSCOPY N/A 2021    Procedure: COLONOSCOPY;  Surgeon: Lee Garcia MD;  Location: [facility] ENDOSCOPY;  Service: Gastroenterology;  Laterality: N/A;  COLON POLYPS   • ENDOSCOPY     • ENDOSCOPY N/A 2021    Procedure: ESOPHAGOGASTRODUODENOSCOPY;  Surgeon: Lee Garcia MD;  Location: [facility] ENDOSCOPY;  Service: Gastroenterology;  Laterality: N/A;  BARRETTS ESOPH/HIATAL HERNIA   • FOOT SURGERY Left    • HYSTERECTOMY     • OOPHORECTOMY  Bilateral    • SACROILIAC JOINT INJECTION Bilateral 4/8/2021    Procedure: SACROILIAC INJECTION;  Surgeon: Epifanio Plaza MD;  Location: [facility] MAIN OR;  Service: Pain Management;  Laterality: Bilateral;   • SACROILIAC JOINT INJECTION Bilateral 10/5/2021    Procedure: SACROILIAC INJECTION;  Surgeon: Epifanio Plaza MD;  Location: [facility] MA[facility];  Service: Pain Management;  Laterality: Bilateral;   • ULNAR NERVE TRANSPOSITION Bilateral      PT Assessment (last 12 hours)     PT Evaluation and Treatment     Row Name 11/03/21 1100          Physical Therapy Time and Intention    Subjective Information complains of; pain  Low back and B hips  -AV     Document Type evaluation  -AV     Mode of Treatment individual therapy; physical therapy  -AV     Patient Effort good  -AV     Row Name 11/03/21 1100          General Information    Patient Profile Reviewed yes  -AV     Prior Level of Function --  Pt reports being independent with ADLs with assist PRN from spouse. Has RW, STC, knee scooter but reports ambulating without an assistive device. No home O2.  -AV     Existing Precautions/Restrictions fall  -AV     Row Name 11/03/21 1100          Living Environment    Current Living Arrangements home/apartment/condo  -AV     Lives With spouse  -AV     Row Name 11/03/21 1100          Range of Motion (ROM)    Range of Motion bilateral lower extremities; ROM is WFL  -AV     Row Name 11/03/21 1100          Strength (Manual Muscle Testing)    Strength (Manual Muscle Testing) bilateral lower extremities; strength is WFL  -AV     Row Name 11/03/21 1100          Bed Mobility    Bed Mobility supine-sit-supine  -AV     Supine-Sit-Supine [place] (Bed Mobility) standby assist  -AV     Row Name 11/03/21 1100          Transfers    Transfers sit-stand transfer  -AV     Comment (Transfers) Patient completed 4 sit-stand transfers throughout duration of evaluation with CGA and RW.  -AV     Sit-Stand [place] (Transfers) contact guard   -AV     Row Name 11/03/21 1100          Sit-Stand Transfer    Assistive Device (Sit-Stand Transfers) walker, front-wheeled  -AV     Row Name 11/03/21 1100          Gait/Stairs (Locomotion)    [place] Level (Gait) contact guard  -AV     Assistive Device (Gait) walker, front-wheeled  -AV     Distance in Feet (Gait) 10  -AV     Pattern (Gait) step-to  -AV     Deviations/Abnormal Patterns (Gait) gait speed decreased; stride length decreased; zachery decreased  -AV     Comment (Gait/Stairs) Patient completed transfer from bed>recliner, and again recliner>bed at end of session despite encouragement to stay out of bed and maintain sitting upright in recliner.  -AV     Row Name 11/03/21 1100          Safety Issues, Functional Mobility    Safety Issues Affecting Function (Mobility) ability to follow commands; insight into deficits/self-awareness  -AV     Row Name 11/03/21 1100          Balance    Balance Assessment standing dynamic balance; standing static balance  -AV     Static Standing Balance mild impairment  -AV     Dynamic Standing Balance mild impairment  -AV     Row Name             Wound 11/03/21 0420 Left posterior thumb Other (comment)    Wound - Properties Group Placement Date: 11/03/21  - Placement Time: 0420  -SS Present on Hospital Admission: Y  -SS Side: Left  -SS Orientation: posterior  -SS Location: thumb  -SS Primary Wound Type: Other  -SS, Pt claims insect bite  Additional Comments: Thumb red, swollen  -SS     Retired Wound - Properties Group Date first assessed: 11/03/21  - Time first assessed: 0420  -SS Present on Hospital Admission: Y  -SS Side: Left  -SS Location: thumb  -SS Primary Wound Type: Other  -SS, Pt claims insect bite  Additional Comments: Thumb red, swollen  -SS     Row Name 11/03/21 1100          Plan of Care Review    Plan of Care Reviewed With patient  -AV     Progress no change  -AV     Outcome Summary Patient presents with deficits impacting balance, transfers, and ambulation.  Patient will benefit from skilled PT services to address these mobility deficits and decrease risk of falls in order to maximize independence.  -AV     Row Name 11/03/21 1100          Physical Therapy Goals    Bed Mobility Goal Selection (PT) bed mobility, PT goal 1  -AV     Transfer Goal Selection (PT) transfer, PT goal 1  -AV     Gait Training Goal Selection (PT) gait training, PT goal 1  -AV     Row Name 11/03/21 1100          Bed Mobility Goal 1 (PT)    Activity/Assistive Device (Bed Mobility Goal 1, PT) bed mobility activities, all  -AV     [place] Level/Cues Needed (Bed Mobility Goal 1, PT) modified independence  -AV     Time Frame (Bed Mobility Goal 1, PT) long term goal (LTG); 10 days  -AV     Row Name 11/03/21 1100          Transfer Goal 1 (PT)    Activity/Assistive Device (Transfer Goal 1, PT) sit-to-stand/stand-to-sit; bed-to-chair/chair-to-bed; walker, rolling  -AV     [place] Level/Cues Needed (Transfer Goal 1, PT) modified independence  -AV     Time Frame (Transfer Goal 1, PT) long term goal (LTG); 10 days  -AV     Row Name 11/03/21 1100          Gait Training Goal 1 (PT)    Activity/Assistive Device (Gait Training Goal 1, PT) gait (walking locomotion); assistive device use; walker, rolling  -AV     [place] Level (Gait Training Goal 1, PT) modified independence  -AV     Distance (Gait Training Goal 1, PT) 200  -AV     Time Frame (Gait Training Goal 1, PT) long term goal (LTG); 10 days  -AV     Row Name 11/03/21 1100          Therapy Assessment/Plan (PT)    Rehab Potential (PT) good, to achieve stated therapy goals  -AV     Criteria for Skilled Interventions Met (PT) yes; meets criteria  -AV     Problem List (PT) problems related to; balance; mobility; strength  -AV     Activity Limitations Related to Problem List (PT) unable to transfer safely; unable to ambulate safely  -AV     Row Name 11/03/21 1100          PT Evaluation Complexity    History, PT Evaluation Complexity 1-2 personal  factors and/or comorbidities  -AV     Examination of Body Systems (PT Eval Complexity) total of 4 or more elements  -AV     Clinical Presentation (PT Evaluation Complexity) stable  -AV     Clinical Decision Making (PT Evaluation Complexity) low complexity  -AV     Overall Complexity (PT Evaluation Complexity) low complexity  -AV     Row Name 11/03/21 1100          Therapy Plan Review/Discharge Plan (PT)    Therapy Plan Review (PT) evaluation/treatment results reviewed; patient  -AV           User Key  (r) = Recorded By, (t) = Taken By, (c) = Cosigned By    Initials Name Provider Type    Salome Weeks, RN Registered Nurse    AV Stanley Serrato, PT Physical Therapist              Physical Therapy Education                 Title: PT OT SLP Therapies (In Progress)     Topic: Physical Therapy (Done)     Point: Mobility training (Done)     Learning Progress Summary           Patient Acceptance, E,TB, VU by AV at 11/3/2021 1205                   Point: Home exercise program (Done)     Learning Progress Summary           Patient Acceptance, E,TB, VU by AV at 11/3/2021 1205                   Point: Body mechanics (Done)     Learning Progress Summary           Patient Acceptance, E,TB, VU by AV at 11/3/2021 1205                   Point: Precautions (Done)     Learning Progress Summary           Patient Acceptance, E,TB, VU by AV at 11/3/2021 1205                               User Key     Initials Effective Dates Name Provider Type Discipline    AV 06/11/21 -  Stanley Serrato, MEGAN Physical Therapist PT              PT Recommendation and Plan  Anticipated Discharge Disposition (PT): home with home health, home with assist  Planned Therapy Interventions (PT): balance training, bed mobility training, gait training, neuromuscular re-education, strengthening, transfer training  Therapy Frequency (PT): daily  Plan of Care Reviewed With: patient  Progress: no change  Outcome Summary: Patient presents with deficits impacting  balance, transfers, and ambulation. Patient will benefit from skilled PT services to address these mobility deficits and decrease risk of falls in order to maximize independence.   Outcome Measures     Row Name 11/03/21 1200             How much help from another person do you currently need...    Turning from your back to your side while in flat bed without using bedrails? 4  -AV      Moving from lying on back to sitting on the side of a flat bed without bedrails? 3  -AV      Moving to and from a bed to a chair (including a wheelchair)? 3  -AV      Standing up from a chair using your arms (e.g., wheelchair, bedside chair)? 3  -AV      Climbing 3-5 steps with a railing? 2  -AV      To walk in hospital room? 3  -AV      AM-PAC 6 Clicks Score (PT) 18  -AV              Functional Assessment    Outcome Measure Options AM-PAC 6 Clicks Basic Mobility (PT)  -AV            User Key  (r) = Recorded By, (t) = Taken By, (c) = Cosigned By    Initials Name Provider Type    AV Stanley Serrato, PT Physical Therapist                 Time Calculation:    PT Charges     Row Name 11/03/21 1204             Time Calculation    PT Received On 11/03/21  -AV      PT Goal Re-Cert Due Date 11/12/21  -AV              Timed Charges    38578 - PT Therapeutic Activity Minutes 12  -AV              Untimed Charges    PT Eval/Re-eval Minutes 25  -AV              Total Minutes    Timed Charges Total Minutes 12  -AV      Untimed Charges Total Minutes 25  -AV       Total Minutes 37  -AV            User Key  (r) = Recorded By, (t) = Taken By, (c) = Cosigned By    Initials Name Provider Type    AV Stanley Serrato, PT Physical Therapist              Therapy Charges for Today     Code Description Service Date Service Provider Modifiers Qty    93523188739 HC PT THERAPEUTIC ACT EA 15 MIN 11/3/2021 Stanley Serrato, PT GP 1    92932714227 HC PT EVAL LOW COMPLEXITY 2 11/3/2021 Stanley Serrato, PT GP 1          PT G-Codes  Outcome Measure Options:  AM-PAC 6 Clicks Basic Mobility (PT)  -[facility] 6 Clicks Score (PT): 18    Stanley Serrato, PT  11/3/2021

## 2021-11-03 NOTE — PLAN OF CARE
Goal Outcome Evaluation:  Plan of Care Reviewed With: patient        Progress: no change  Outcome Summary: PT HAS BEEN ALERT AND ORIENTED THIS SHIFT. PT COMPLAINS OF PAIN IN LOWER BACK AND EXTREMITIES. IS AN ASSIST X1 TO BEDSIDE DO TO PAIN AND WEAKNESS. Zohaib Mason RN

## 2021-11-03 NOTE — THERAPY EVALUATION
Patient Name: Ana Maria Story  : 1970    MRN: [id number]                              Today's Date: 11/3/2021       Admit Date: 2021    Visit Dx:     ICD-10-CM ICD-9-CM   1. Altered mental status, unspecified altered mental status type  R41.82 780.97   2. Acute cystitis without hematuria  N30.00 595.0   3. Hyponatremia  E87.1 276.1   4. Hypokalemia  E87.6 276.8   5. Hypomagnesemia  E83.42 275.2   6. Occult blood in stools  R19.5 792.1   7. Difficulty walking  R26.2 719.7   8. Decreased activities of daily living (ADL)  Z78.9 V49.89     Patient Active Problem List   Diagnosis   • Disorder of peripheral nervous system   • B12 neuropathy (HCC)   • Chronic pain syndrome   • Encounter for long-term (current) use of medications   • Sacroiliac joint dysfunction of both sides   • Pain in extremity   • SI (sacroiliac) joint dysfunction   • Descending colon polyp   • Acute bilateral low back pain with sciatica   • Rheumatoid arthritis involving multiple sites (HCC)   • Hip pain, bilateral   • Hypokalemia   • Bacteremia due to Gram-positive bacteria     Past Medical History:   Diagnosis Date   • Shore's esophagus    • Extremity pain    • GERD (gastroesophageal reflux disease)    • Hiatal hernia    • IBS (irritable bowel syndrome)    • Joint pain    • Leg pain    • Low back pain    • Osteoarthritis    • Ovarian cyst    • Peripheral neuropathy    • Sleep apnea    • Vitamin B12 deficiency    • Vitamin D deficiency      Past Surgical History:   Procedure Laterality Date   • CARPAL TUNNEL RELEASE Bilateral    • CHOLECYSTECTOMY     • COLONOSCOPY     • COLONOSCOPY N/A 2021    Procedure: COLONOSCOPY;  Surgeon: Lee Garcia MD;  Location: [facility] ENDOSCOPY;  Service: Gastroenterology;  Laterality: N/A;  COLON POLYPS   • ENDOSCOPY     • ENDOSCOPY N/A 2021    Procedure: ESOPHAGOGASTRODUODENOSCOPY;  Surgeon: Lee Garcia MD;  Location: [facility] ENDOSCOPY;  Service: Gastroenterology;  Laterality:  N/A;  BARRETTS ESOPH/HIATAL HERNIA   • FOOT SURGERY Left    • HYSTERECTOMY     • OOPHORECTOMY Bilateral    • SACROILIAC JOINT INJECTION Bilateral 4/8/2021    Procedure: SACROILIAC INJECTION;  Surgeon: Epifanio Plaza MD;  [place] [facility] [place]  Service: Pain Management;  Laterality: Bilateral;   • SACROILIAC JOINT INJECTION Bilateral 10/5/2021    Procedure: SACROILIAC INJECTION;  Surgeon: Epifanio Plaza MD;  [place] [facility] [place];  Service: Pain Management;  Laterality: Bilateral;   • ULNAR NERVE TRANSPOSITION Bilateral       General Information     Row Name 11/03/21 1546          OT Time and Intention    Document Type evaluation  -     Mode of Treatment individual therapy; occupational therapy  -     Row Name 11/03/21 1546          General Information    Patient Profile Reviewed yes  -     Prior Level of Function independent:  pt. reports (I) with adls and fx'l mobility without AD. She states she has no dme and was driving previously  -     Existing Precautions/Restrictions fall  -     Barriers to Rehab none identified  -     Row Name 11/03/21 1546          Occupational Profile    Reason for Services/Referral (Occupational Profile) Pt. is a 50 year old female admitted for the above diagnosis. Pt. referred to OT services to assess independence with ADLs and adl transfers/fx'l mobility. No previous OT services for current condition.  -     Row Name 11/03/21 1546          Living Environment    Lives With spouse  -     Row Name 11/03/21 1546          Cognition    Orientation Status (Cognition) oriented x 3  -     Row Name 11/03/21 1546          Safety Issues, Functional Mobility    Impairments Affecting Function (Mobility) balance; coordination; endurance/activity tolerance  -           User Key  (r) = Recorded By, (t) = Taken By, (c) = Cosigned By    Initials Name Provider Type     Joanne Cedeno OT Occupational Therapist                 Mobility/ADL's     Row Name 11/03/21 1545           Bed Mobility    Bed Mobility supine-sit-supine  -     Supine-Sit-Supine [place] (Bed Mobility) standby assist  -     Row Name 11/03/21 1548          Transfers    Transfers sit-stand transfer  -     Sit-Stand [place] (Transfers) contact guard; verbal cues  -     Row Name 11/03/21 1548          Sit-Stand Transfer    Assistive Device (Sit-Stand Transfers) --  bed rail  -     Row Name 11/03/21 1548          Activities of Daily Living    BADL Assessment/Intervention --  patient is setup for upper body bathing/dressing/grooming, max A for lower body dressing, mod lower body bathing and mod for toileting  -           User Key  (r) = Recorded By, (t) = Taken By, (c) = Cosigned By    Initials Name Provider Type    Joanne Kelly OT Occupational Therapist               Obj/Interventions     Row Name 11/03/21 1550          Sensory Assessment (Somatosensory)    Sensory Assessment (Somatosensory) sensation intact  -[facility] Name 11/03/21 1550          Vision Assessment/Intervention    Visual Impairment/Limitations WNL  -[facility] Name 11/03/21 1550          Range of Motion Comprehensive    General Range of Motion bilateral upper extremity ROM WFL  -[facility] Name 11/03/21 1550          Strength Comprehensive (MMT)    General Manual Muscle Testing (MMT) Assessment no strength deficits identified  -     Row Name 11/03/21 1550          Motor Skills    Motor Skills coordination; functional endurance  -     Coordination WFL  -     Functional Endurance fair  -     Row Name 11/03/21 1550          Balance    Balance Assessment standing static balance  -     Static Standing Balance mild impairment  -           User Key  (r) = Recorded By, (t) = Taken By, (c) = Cosigned By    Initials Name Provider Type    Joanne Kelly OT Occupational Therapist               Goals/Plan     Row Name 11/03/21 1554          Bed Mobility Goal 1 (OT)    Activity/Assistive Device (Bed Mobility Goal 1, OT) bed  mobility activities, all  -AC     [place] Level/Cues Needed (Bed Mobility Goal 1, OT) modified independence  -AC     Time Frame (Bed Mobility Goal 1, OT) long term goal (LTG); 10 days  -AC     Row Name 11/03/21 1554          Transfer Goal 1 (OT)    Activity/Assistive Device (Transfer Goal 1, OT) transfers, all  -AC     [place] Level/Cues Needed (Transfer Goal 1, OT) modified independence  -AC     Row Name 11/03/21 1554          Bathing Goal 1 (OT)    Activity/Device (Bathing Goal 1, OT) bathing skills, all  -AC     [place] Level/Cues Needed (Bathing Goal 1, OT) modified independence  -AC     Time Frame (Bathing Goal 1, OT) long term goal (LTG); 10 days  -AC     Row Name 11/03/21 1554          Dressing Goal 1 (OT)    Activity/Device (Dressing Goal 1, OT) dressing skills, all  -AC     [place]/Cues Needed (Dressing Goal 1, OT) modified independence  -AC     Time Frame (Dressing Goal 1, OT) long term goal (LTG); 10 days  -     Row Name 11/03/21 1554          Toileting Goal 1 (OT)    Activity/Device (Toileting Goal 1, OT) toileting skills, all  -AC     [place] Level/Cues Needed (Toileting Goal 1, OT) modified independence  -AC     Time Frame (Toileting Goal 1, OT) long term goal (LTG); 10 days  -     Row Name 11/03/21 1554          Grooming Goal 1 (OT)    Activity/Device (Grooming Goal 1, OT) grooming skills, all  -AC     [place] (Grooming Goal 1, OT) modified independence  -AC     Time Frame (Grooming Goal 1, OT) long term goal (LTG); 10 days  -     Row Name 11/03/21 1554          Therapy Assessment/Plan (OT)    Planned Therapy Interventions (OT) activity tolerance training; BADL retraining; functional balance retraining; ROM/therapeutic exercise; occupation/activity based interventions; patient/caregiver education/training; transfer/mobility retraining  -AC           User Key  (r) = Recorded By, (t) = Taken By, (c) = Cosigned By    Initials Name Provider Type    Joanne Kelly, OT  Occupational Therapist               Clinical Impression     Row Name 11/03/21 1551          Pain Assessment    Additional Documentation Pain Scale: FACES Pre/Post-Treatment (Group)  -     Row Name 11/03/21 1551          Pain Scale: FACES Pre/Post-Treatment    Pain: FACES Scale, Pretreatment 0-->no hurt  -     Posttreatment Pain Rating 0-->no hurt  -AC     Row Name 11/03/21 1551          Plan of Care Review    Plan of Care Reviewed With patient  -     Progress no change  -     Outcome Summary Patient presents with endurance and balance limitations that impede his/her ability to perform ADLS. The skills of a therapist are necessary to maximize independence with ADLs.  -     Row Name 11/03/21 1551          Therapy Assessment/Plan (OT)    Patient/Family Therapy Goal Statement (OT) Patient would like to maximize independence with adls.  -AC     Rehab Potential (OT) good, to achieve stated therapy goals  -     Criteria for Skilled Therapeutic Interventions Met (OT) yes; meets criteria; skilled treatment is necessary  -     Therapy Frequency (OT) 5 times/wk  -     Row Name 11/03/21 1551          Therapy Plan Review/Discharge Plan (OT)    Equipment Needs Upon Discharge (OT) commode chair; walker, rolling; shower chair  -     Anticipated Discharge Disposition (OT) inpatient rehabilitation facility  -           User Key  (r) = Recorded By, (t) = Taken By, (c) = Cosigned By    Initials Name Provider Type    AC Joanne Cedeno OT Occupational Therapist               Outcome Measures     Row Name 11/03/21 4379          How much help from another is currently needed...    Putting on and taking off regular lower body clothing? 2  -AC     Bathing (including washing, rinsing, and drying) 3  -AC     Toileting (which includes using toilet bed pan or urinal) 3  -AC     Putting on and taking off regular upper body clothing 4  -AC     Taking care of personal grooming (such as brushing teeth) 4  -AC     Eating meals 4   -AC     AM-PAC 6 Clicks Score (OT) 20  -AC     Row Name 11/03/21 1200 11/03/21 0932       How much help from another person do you currently need...    Turning from your back to your side while in flat bed without using bedrails? 4  -AV 3  -MS    Moving from lying on back to sitting on the side of a flat bed without bedrails? 3  -AV 3  -MS    Moving to and from a bed to a chair (including a wheelchair)? 3  -AV 3  -MS    Standing up from a chair using your arms (e.g., wheelchair, bedside chair)? 3  -AV 3  -MS    Climbing 3-5 steps with a railing? 2  -AV 2  -MS    To walk in hospital room? 3  -AV 2  -MS    AM-PAC 6 Clicks Score (PT) 18  -AV 16  -MS    Row Name 11/03/21 1557 11/03/21 1200       Functional Assessment    Outcome Measure Options AM-PAC 6 Clicks Daily Activity (OT); Optimal Instrument  -AC AM-PAC 6 Clicks Basic Mobility (PT)  -AV    Row Name 11/03/21 1557          Optimal Instrument    Optimal Instrument Optimal - 3  -AC     Bending/Stooping 4  -AC     Standing 2  -AC     Reaching 1  -AC     From the list, choose the 3 activities you would most like to be able to do without any difficulty Bending/stooping; Standing; Reaching  -AC     Total Score Optimal - 3 7  -AC           User Key  (r) = Recorded By, (t) = Taken By, (c) = Cosigned By    Initials Name Provider Type    Zohaib Lynn, RN Registered Nurse    Joanne Kelly, CARLOS Occupational Therapist    Stanley Davenport, PT Physical Therapist                Occupational Therapy Education                 Title: PT OT SLP Therapies (In Progress)     Topic: Occupational Therapy (Done)     Point: ADL training (Done)     Description:   Instruct learner(s) on proper safety adaptation and remediation techniques during self care or transfers.   Instruct in proper use of assistive devices.              Learning Progress Summary           Patient Acceptance, E, VU by KAIA at 11/3/2021 9934                   Point: Home exercise program (Done)      Description:   Instruct learner(s) on appropriate technique for monitoring, assisting and/or progressing therapeutic exercises/activities.              Learning Progress Summary           Patient Acceptance, E, VU by  at 11/3/2021 1559                   Point: Precautions (Done)     Description:   Instruct learner(s) on prescribed precautions during self-care and functional transfers.              Learning Progress Summary           Patient Acceptance, E, VU by  at 11/3/2021 1559                   Point: Body mechanics (Done)     Description:   Instruct learner(s) on proper positioning and spine alignment during self-care, functional mobility activities and/or exercises.              Learning Progress Summary           Patient Acceptance, E, VU by  at 11/3/2021 1559                               User Key     Initials Effective Dates Name Provider Type Discipline     06/16/21 -  Joanne Cedeno OT Occupational Therapist OT              OT Recommendation and Plan  Planned Therapy Interventions (OT): activity tolerance training, BADL retraining, functional balance retraining, ROM/therapeutic exercise, occupation/activity based interventions, patient/caregiver education/training, transfer/mobility retraining  Therapy Frequency (OT): 5 times/wk  Plan of Care Review  Plan of Care Reviewed With: patient  Progress: no change  Outcome Summary: Patient presents with endurance and balance limitations that impede his/her ability to perform ADLS. The skills of a therapist are necessary to maximize independence with ADLs.     Time Calculation:    Time Calculation- OT     Row Name 11/03/21 1602 11/03/21 1431          Time Calculation- OT    OT Received On 11/03/21  - 11/03/21  -     OT Goal Re-Cert Due Date 11/12/21  -AC --            Untimed Charges    OT Eval/Re-eval Minutes 31 -AC --            Total Minutes    Untimed Charges Total Minutes 31  -AC --      Total Minutes 31 -AC --           User Key  (r) = Recorded  By, (t) = Taken By, (c) = Cosigned By    Initials Name Provider Type     Joanne Cedeno OT Occupational Therapist              Therapy Charges for Today     Code Description Service Date Service Provider Modifiers Qty    [id number] HC OT EVAL LOW COMPLEXITY 3 11/3/2021 Joanne Cedeno OT GO 1               Jonane Cedeno OT  11/3/2021

## 2021-11-03 NOTE — PLAN OF CARE
Goal Outcome Evaluation:  Plan of Care Reviewed With: patient        Progress: no change  Outcome Summary: Patient presents with deficits impacting balance, transfers, and ambulation. Patient will benefit from skilled PT services to address these mobility deficits and decrease risk of falls in order to maximize independence.

## 2021-11-03 NOTE — SIGNIFICANT NOTE
11/03/21 1311   Plan   Plan Spoke with patient that reports lives with her spouse and small grandchildren.  Reports spouse is able to assist if needed.  Patient answered all questions appropriately.  Plans to return home with no needs at this time.  Will continue to monitor.

## 2021-11-03 NOTE — H&P
[facility]  Date: 11/[facility]/2021   Patient Name: Ana Maria Story  : 1970  MRN: [id number]  Primary Care Physician:  Karmen Roe APRN  Date of admission: 2021    Subjective   Subjective     Chief Complaint: Weakness.     HPI: Ana Maria Story is a 50 y.o. female who presented with complaint of body aches and difficulty with ambulation.  On arrival patient was confused, drowsy,and  difficult to arouse.  Patient states that she had a Covid vaccine on .  She states her symptoms progressively became worse since that time.  At first she attributed to a crisis of rheumatoid arthritis or osteoarthritis.  Patient denies fever nausea vomiting or diarrhea, she denies coughing or shortness of breath.Patient states that she has had stool incontinence that started today.  She states she has severe pain in her back and pain in her lower extremities.  She states this pain feels more severe than her normal rheumatoid arthritis crisis.  On exam patient had normal reflexes on lower extremities decreased sensitivity and decreased motor strength.  She denies upper extremity symptoms she denies respiratory symptoms.  She states she takes flu shots every year and has never had an adverse reaction to the vaccine.  This was her first Covid vaccine patient states that her symptoms have been waxing and waning.  She states she was prescribed Toradol and a steroids by her primary care physician with some relief.    Personal History   ROS     Constitutional: Profuse fatigue   HEENT: No vision changes, loss of vision, double vision, difficulty swallowing, painful swallowing, or tinnitus  Respiratory: No cough, shortness of breath, sputum production, or hemoptysis  Cardiovascular: No chest pain, palpitations, orthopnea, or paroxysmal nocturnal dyspnea  Gastrointestinal: No nausea, vomiting, diarrhea, hematochezia, bright red blood per rectum, dark tarry stools, bowel incontinence or  constipation  Genitourinary: No dysuria, hematuria, bladder incontinence, frequency, nocturia, or hesitancy  Musculoskeletal: Profuse weakness and back pain.   Endocrine: No fatigue, heat or cold intolerance  Hematologic: No excessive bruising or bleeding  Psychiatric: No Anxiety or depression  Neurologic: Patient is confused , weak and having difficulty with ambulation.   Skin: No rash or open wounds         PMH  Past Medical History:   Diagnosis Date   • Shore's esophagus    • Extremity pain    • GERD (gastroesophageal reflux disease)    • Hiatal hernia    • IBS (irritable bowel syndrome)    • Joint pain    • Leg pain    • Low back pain    • Osteoarthritis    • Ovarian cyst    • Peripheral neuropathy    • Sleep apnea    • Vitamin B12 deficiency    • Vitamin D deficiency          PSH  Past Surgical History:   Procedure Laterality Date   • CARPAL TUNNEL RELEASE Bilateral    • CHOLECYSTECTOMY     • COLONOSCOPY N/A 6/30/2021   • ENDOSCOPY     • FOOT SURGERY Left    • HYSTERECTOMY     • OOPHORECTOMY Bilateral    • SACROILIAC JOINT INJECTION Bilateral 4/8/2021   • ULNAR NERVE TRANSPOSITION Bilateral        FH  Family History   Problem Relation Age of Onset   • Arthritis Mother    • Diabetes Mother    • Hypertension Mother    • Ovarian cysts Sister    • Diabetes Brother    • Hypertension Daughter    • Arthritis Maternal Grandmother    • Diabetes Maternal Grandmother    • Hypertension Maternal Grandmother    • Arthritis Maternal Grandfather    • Malig Hyperthermia Neg Hx        SOCIAL HISTORY   reports that she has been smoking. She has a 6.25 pack-year smoking history. She has never used smokeless tobacco. She reports that she does not drink alcohol and does not use drugs.     ALLERGIES   Allergies   Allergen Reactions   • Latex Itching   • Adhesive Tape Rash       HOME MEDICINES  Prior to Admission Medications   Prescriptions Last Dose Informant Patient Reported? Taking?   Cyanocobalamin (B-12 Compliance Injection)  1000 MCG/ML kit   Yes No   Sig: Inject 1,000 mcg as directed.   Cyanocobalamin 1000 MCG/ML kit   Yes No   Sig: Inject  as directed.   HYDROcodone-acetaminophen (NORCO) 5-325 MG per tablet   No No   Sig: Take 1 tablet by mouth Every 6 (Six) Hours As Needed for Severe Pain . DNF 10/22/2021   acetaminophen (Tylenol 8 Hour Arthritis Pain) 650 MG 8 hr tablet  Self Yes No   Sig: Take 650 mg by mouth Every 8 (Eight) Hours As Needed for Mild Pain .   cyclobenzaprine (FLEXERIL) 10 MG tablet   No No   Sig: Take 1 tablet by mouth 3 (Three) Times a Day As Needed for Muscle Spasms.   ketorolac (TORADOL) 10 MG tablet   No No   Sig: Take 1 tablet by mouth Every 6 (Six) Hours As Needed for Moderate Pain .   meclizine (ANTIVERT) 12.5 MG tablet   Yes No   Sig: take 1 tablet by mouth three times a day if needed for VERTIGO   nitrofurantoin, macrocrystal-monohydrate, (MACROBID) 100 MG capsule   No No   Sig: Take 1 capsule by mouth 2 (Two) Times a Day.   omeprazole (priLOSEC) 20 MG capsule   No No   Sig: Take 1 capsule by mouth Daily.   polyethylene glycol (MIRALAX) powder   Yes No   Sig: Take by mouth daily. Mix 1 capful in 8 ounces of water and drink daily   pregabalin (LYRICA) 200 MG capsule   No No   Sig: Take 1 capsule by mouth 3 (Three) Times a Day. DNF 11/28/2020   vitamin D (ERGOCALCIFEROL) 1.25 MG (63658 UT) capsule capsule   Yes No   Sig: Take 50,000 Units by mouth 1 (One) Time Per Week.      Facility-Administered Medications: None                     Objective     Vitals:   Temp:  [99.7 °F (37.6 °C)-99.9 °F (37.7 °C)] 99.9 °F (37.7 °C)  Heart Rate:  [102-121] 103  Resp:  [18-22] 22  BP: (101-119)/(66-95) 111/95    Physical Exam  Vital signs were reviewed.  General appearance - Patient appears well-developed and well-nourished.    Head - Normocephalic, atraumatic.  Pupils - Equal, round, reactive to light.  Extraocular muscles are intact.  Conjunctive is clear  Oral mucosa - Pink and moist without lesions or erythema.  Uvula  is midline.  Neck - Supple.  Trachea was midline.  There is no palpable lymphadenopathy or thyromegaly.  There are no meningeal signs  Lungs -Breath sounds equal bilateral.  No crackles no rails.  Heart -Regular rate and rhythm no murmurs no gallops.  Abdomen -     There is no rebound, guarding, or rigidity.  There are no palpable masses.  There are no pulsatile masses.  Back - Spine is straight and midline.  There is no CVA tenderness.  Extremities - Intact x4 with full range of motion.  There is no palpable edema.  Neurologic - Cranial nerves II through XII are grossly intact.  Patient has right-sided residual hemiparesis.  Cerebellar function was normal.  Normal patellar reflexes bilaterally.    Integument - There are no rashes.  There are no petechia or purpura lesions noted.  There are no vesicular lesions noted.           Assessment / Plan     Assessment/Plan:   Profuse weakness  -Patient attributes weakness to Covid vaccine.  -We will obtain a CK level  -We will check TSH  -We will check a sed rate and a CRP  -We will obtain a neurology consult and defer further testing to them, patient may benefit from MRI of the spine  guillain Orion is on the differential giving timing of onset of symptoms with vaccine timing.    Acute anemia  -Positive Hemoccult  -Consider GI consult  -We will obtain iron studies  History of B12 deficiency  -We will check a B12    Chronic pain syndrome   -Restart home medicines    Hypokalemia  -Replace potassium    Mild leukocytosis  -Started patient on antibiotics with Rocephin  -Urine and blood cultures obtained    Hypomagnesemia  -Replace magnesium.       DVT prophylaxis:  Mechanical DVT prophylaxis orders are present.    CODE STATUS:       Result Review:    I have personally reviewed the results from the time of this admission to 6/11/2021 06:16 EDT and agree with these findings:  [x]  Laboratory  [x]  Microbiology  [x]  Radiology  [x]  EKG/Telemetry   []  Cardiology/Vascular   []   Pathology  []  Old records  []  Other:    Admission Status:  I believe this patient meets inpatient status.    Electronically signed by Yeny Villegas MD, 11/03/21, 1:07 AM EDT.

## 2021-11-03 NOTE — PLAN OF CARE
"Goal Outcome Evaluation:  Pt pleasant and compliant with care. Pt had 2 loose stools while under my care. Pt claims she has IBS and diarrhea is common for her. Pt expressed pain in her hips upon moving. Pt weak and unsteady when transferring to [facility], but tolerated decent with X2 assist. Pt stated \"My legs feel heavy\" when getting up. Pt has had no significant changes or complaints while under my care so far. Will continue to monitor. Salome Nolasco RN                 "

## 2021-11-03 NOTE — PROGRESS NOTES
[facility]   Hospitalist Progress Note  Date: 11/3/2021  Patient Name: Ana Maria Story  : 1970  MRN: [id number]  Date of admission: 2021      Subjective   Subjective     Chief Complaint: Follow-up for low back pain, lower extremity weakness    Summary: 50-year-old female with history of rheumatoid arthritis, osteoarthritis presented for evaluation of weakness in her lower extremities, low back/hip pain, diffuse joint pain.  She normally gets diffuse joint pain during a flare of RA however states that the pain was more severe than usual.  Has had progressive worsening weakness in the lower extremities for the past couple weeks and now has trouble ambulating.  Has chronic bilateral hip pain and gets steroid injections in her SI joint.  She has not seen a rheumatologist in years.    Interval Followup: No confusion this morning.  Reports ongoing pain in her bilateral hips, low back and weakness in her lower extremities.  Was unable to get up out of bed independently due to pain in her hips.  Reports shooting pains going from her back down into her legs.      Review of Systems   Review of Systems - General ROS: positive for  - fatigue  negative for - chills or fever  Musculoskeletal ROS: positive for - joint pain, joint stiffness, joint swelling and muscular weakness  Neurological ROS: no TIA or stroke symptoms  negative for - confusion, bowel or urinary incontinence    Objective   Objective     Vitals:   Temp:  [98.2 °F (36.8 °C)-99.9 °F (37.7 °C)] 98.2 °F (36.8 °C)  Heart Rate:  [102-121] 106  Resp:  [16-22] 18  BP: (101-130)/(57-95) 122/57  Physical Exam    Constitutional: WNWD, awake, alert and conversant   Eyes: Pupils equal, sclerae anicteric, no conjunctival injection   HENT: NCAT, mucous membranes moist   Neck: Supple, no thyromegaly, trachea midline   Respiratory: Clear to auscultation bilaterally, nonlabored respirations    Cardiovascular: RRR,  no pedal edema   Gastrointestinal: Positive  bowel sounds, soft, nontender, nondistended   Musculoskeletal: Left thumb swollen and red, bilateral hips tender to palpation, left hip pain elicited with left leg straight raise, no clubbing or cyanosis to extremities   Neurologic: Oriented x 3, muscle strength 3 out of 5 both lower extremities, 4 out of 5 in the upper extremities, cranial Nerves grossly intact without focal deficit, speech clear   Skin: Warm and dry, no rashes     Result Review    Result Review:  I have personally reviewed the results from the time of this admission to 11/3/2021 11:09 EDT and agree with these findings:  [x]  Laboratory C-reactive protein 31, sed rate 78  []  Microbiology  []  Radiology  []  EKG/Telemetry   []  Cardiology/Vascular   []  Pathology  []  Old records  []  Other:    Assessment/Plan   Assessment / Plan     Assessment:  Active Hospital Problems    Diagnosis  POA   • Acute bilateral low back pain with sciatica [M54.40]  Yes   • Rheumatoid arthritis involving multiple sites (HCC) [M06.9]  Unknown   • Hip pain, bilateral [M25.551, M25.552]  Yes   • Hypokalemia [E87.6]  Yes   • SI (sacroiliac) joint dysfunction [M53.3]  Yes        Plan:     Check MRI with and without contrast of the lumbar spine and pelvis  Check rheumatoid factor and CCP  Start a short course of prednisone 40 mg daily x3 days  Analgesic regimen with a combination of NSAIDs/Norco  Replace low potassium with oral supplementation  Will need to reestablish with rheumatology as an outpatient - discussed with Dr Graves, they will contact patient to schedule follow up appointment for next week.   Start IV vancomycin empirically given positive blood culture -> follow-up speciation  PT/OT evaluation  A.m. labs    Discussed plan with RN, rheumatology    DVT prophylaxis:  Mechanical DVT prophylaxis orders are present.    CODE STATUS:   Level Of Support Discussed With: Patient  Code Status (Patient has no pulse and is not breathing): CPR (Attempt to Resuscitate)  Medical  Interventions (Patient has pulse or is breathing): Full Support        Electronically signed by Bala Louis DO, 11/03/21, 11:09 AM EDT.

## 2021-11-04 ENCOUNTER — READMISSION MANAGEMENT (OUTPATIENT)
Dept: CALL CENTER | Facility: HOSPITAL | Age: 51
End: 2021-11-04

## 2021-11-04 ENCOUNTER — APPOINTMENT (OUTPATIENT)
Dept: CT IMAGING | Facility: HOSPITAL | Age: 51
End: 2021-11-04

## 2021-11-04 VITALS
OXYGEN SATURATION: 100 % | TEMPERATURE: 97.6 F | SYSTOLIC BLOOD PRESSURE: 115 MMHG | BODY MASS INDEX: 19.68 KG/M2 | DIASTOLIC BLOOD PRESSURE: 68 MMHG | HEIGHT: 68 IN | WEIGHT: 129.85 LBS | RESPIRATION RATE: 16 BRPM | HEART RATE: 94 BPM

## 2021-11-04 LAB
AMPHET+METHAMPHET UR QL: NEGATIVE
BACTERIA SPEC AEROBE CULT: ABNORMAL
BARBITURATES UR QL SCN: NEGATIVE
BENZODIAZ UR QL SCN: NEGATIVE
CANNABINOIDS SERPL QL: NEGATIVE
COCAINE UR QL: NEGATIVE
METHADONE UR QL SCN: NEGATIVE
OPIATES UR QL: POSITIVE
OXYCODONE UR QL SCN: NEGATIVE

## 2021-11-04 PROCEDURE — 25010000002 MEROPENEM PER 100 MG: Performed by: GENERAL PRACTICE

## 2021-11-04 PROCEDURE — 80307 DRUG TEST PRSMV CHEM ANLYZR: CPT | Performed by: GENERAL PRACTICE

## 2021-11-04 PROCEDURE — 25010000002 VANCOMYCIN 5 G RECONSTITUTED SOLUTION: Performed by: GENERAL PRACTICE

## 2021-11-04 PROCEDURE — 74176 CT ABD & PELVIS W/O CONTRAST: CPT

## 2021-11-04 RX ADMIN — HYDROCODONE BITARTRATE AND ACETAMINOPHEN 2 TABLET: 5; 325 TABLET ORAL at 03:26

## 2021-11-04 RX ADMIN — MEROPENEM 1 G: 1 INJECTION, POWDER, FOR SOLUTION INTRAVENOUS at 00:15

## 2021-11-04 RX ADMIN — VANCOMYCIN HYDROCHLORIDE 1500 MG: 5 INJECTION, POWDER, LYOPHILIZED, FOR SOLUTION INTRAVENOUS at 01:50

## 2021-11-04 NOTE — OUTREACH NOTE
Prep Survey      Responses   [facility] patient discharged from? [facility]   Is LACE score < 7 ? Yes   Emergency Room discharge w/ pulse ox? No   Eligibility Not Eligible   What are the reasons patient is not eligible? Other   Does the patient have one of the following disease processes/diagnoses(primary or secondary)? Other   Prep survey completed? Yes          Jeanna Burnette RN

## 2021-11-04 NOTE — PROGRESS NOTES
"Pharmacy to Dose Vancomycin Day: 1    Ana Maria Story is a 50 y.o.female admitted with bacteremia. Pharmacy has been consulted to dose IV Vancomycin     Consulting Provider: Nelly  Clinical Indication: Bacteremia  Pertinent Past Medical History: rheumatoid arthritis. Sciatica  Goal -600 mg/L.hr  Duration of therapy: TBD    172.7 cm (68\")       11/03/21  0400      Weight: 58.9 kg (129 lb 13.6 oz)         Estimated Creatinine Clearance: 168.3 mL/min (A) (by C-G formula based on SCr of 0.38 mg/dL (L)).  Results from last 7 days   Lab Units 11/03/21  0501 11/03/21  0138 11/02/21  2051   BUN mg/dL 6 6 7   CREATININE mg/dL 0.38* 0.37* 0.50*     HD/PD/CRRT?: No    Lab Results   Component Value Date    WBC 13.26 (H) 11/03/2021      Temperature    11/03/21 1527 11/03/21 1902 11/03/21 2302   Temp: 97.9 °F (36.6 °C) 97.4 °F (36.3 °C) 98.2 °F (36.8 °C)     Contrast Administered: 11/3    Relevant Micro:   Microbiology Results (last 10 days)       Procedure Component Value - Date/Time    Blood Culture - Blood, Arm, Left [[id number]]  (Abnormal) Collected: 11/02/21 2259    Lab Status: Preliminary result Specimen: Blood from Arm, Left Updated: 11/03/21 1239     Blood Culture Abnormal Stain     Gram Stain Aerobic Bottle Gram positive cocci in clusters    Blood Culture ID, PCR - Blood, Arm, Right [[id number]]  (Abnormal) Collected: 11/02/21 2259    Lab Status: Final result Specimen: Blood from Arm, Right Updated: 11/03/21 1305     BCID, PCR Staph aureus. mecA/C and MREJ (methicillin resistance gene) NOT detected. Identification by BCID2 PCR    Narrative:      Infectious disease consultation is highly recommended to rule out distant foci of infection.    Urine Culture - Urine, Urine, Catheter In/Out [[id number]]  (Abnormal) Collected: 11/02/21 2254    Lab Status: Preliminary result Specimen: Urine, Catheter In/Out Updated: 11/03/21 2115     Urine Culture >100,000 CFU/mL Staphylococcus aureus     Comment: Staphylococcus aureus is " not typically regarded as a uropathogen, except in cases with genitourinary instrumentation present.  It's presence suggests an alternate source (e.g. bloodstream, abscess, SSTI, etc.).  Please evaluate accordingly.              Relevant Radiology: 11/3 MRI pelvis  1.The study is abnormal.  A very extensive age-indeterminate posterior epidural abscess is suspected, extending from T11 to S1-2 in a contiguous manner; it measures at least 24 cm in craniocaudal extent.  The entire cranial extent of this finding is not included in the field of view.  There is associated severe deformity and compression of the thecal sac in the lumbar spine, which is displaced anteriorly.    2. Also, there is suspected osteomyelitis of the sacrum and posterior iliac crest.  Septic arthritis involving the bilateral sacroiliac joints is possible.  3. Multiloculated, multi-septated intramuscular abscesses involve the bilateral iliopsoas and gluteal musculature, as well as posterior paraspinal musculature.  There may be involvement of the iliacus muscles bilaterally (not mentioned above).    4. There is an incidental chronic T12 vertebral compression fracture, estimated at 30-50 percent in degree with mild posterior wall retropulsion, seen on prior 2016 MRI [facility].  5. Enhanced CT examinations of the chest, abdomen, and pelvis may be helpful in further imaging assessment if clinically warranted and if not contraindicated.  Also, a nonenhanced and enhanced MRI examinations of the cervical and thoracic spine may be helpful in further assessment.    Other Antimicrobial Therapy: Merrem    Assessment/Plan  Loading dose: vanc 1500mg x1  Regimen: 1250 mg IV every 12 hours.  Exposure target: AUC24 (range)400-600 mg/L.hr   AUC24,ss: 464 mg/L.hr  PAUC*: 65 %  Ctrough,ss: 12 mg/L  Pconc*: 18 %  Tox.: 7 %    Note: pharmacy to dose vancomycin for this 51yo, 58.9kg female. SCr 0.38 and CrCl >120mL/min. Will provide a one time dose  vancomycin 1500mg, will plan to start vanc 1250mg q12h maintenance dosing pending random level tomorrow morning. Obtaining random level early in order to evaluate clearance and provide data point to assist with most optimal dosing given clearance calculated at >120mL/min.     Level due: 11/4 random @0800  Labs ordered: AM BMP

## 2021-11-04 NOTE — PLAN OF CARE
Official MRI reading pending.  Abnormal MRI with multiple abscesses on psoas muscles and epidural abscesses.  Initiated transfer to [facility]  Change antibiotics to meropenem  Continue vancomycin  Awaiting phone call from access center for possible transfer, there will be a delay on transfer secondary to no beds.

## 2021-11-04 NOTE — DISCHARGE SUMMARY
[facility]         HOSPITALIST  DISCHARGE SUMMARY    Patient Name: Ana Maria Story  : 1970  MRN: [id number]    Date of Admission: 2021  Date of Discharge:  11/3/2021  Primary Care Physician: Karmen Roe APRN    Consults     Date and Time Order Name Status Description    11/3/2021 12:36 AM Inpatient Hospitalist Consult Completed           Active and Resolved Hospital Problems:  Active Hospital Problems    Diagnosis POA   • Acute bilateral low back pain with sciatica [M54.40] Yes   • Rheumatoid arthritis involving multiple sites (HCC) [M06.9] Unknown   • Hip pain, bilateral [M25.551, M25.552] Yes   • Hypokalemia [E87.6] Yes   • Bacteremia due to Gram-positive bacteria [R78.81] Unknown   • Severe malnutrition (CMS/HCC) [E43] Yes   • SI (sacroiliac) joint dysfunction [M53.3] Yes      Resolved Hospital Problems   No resolved problems to display.       Hospital Course     Hospital Course:  Ana Maria Story is a 50 y.o. female admitted to the hospital for progressive weakness of lower extremity.  Patient states she got her Covid vaccine on 10/3 21 and since then she started developing weakness. Patient states she developed severe lower extremity pain , back pain, patient has been unable to stand up for the last 24 hours.  She had stool incontinence for 2 hours.  Patient was confused on arrival.  with altered mental status and her mental status improved after IV hydration.  Arrival patient had a white count of 16.25, given her profuse weakness in the lower extremities she had an MRI of the lumbar spine with and without contrast that revealed extensive epidural abscesses measuring up to 24 cm.  She also had some iliopsoas abscesses,   Neurosurgery was consulted and recommended transfer to a facility with neurosurgery and infectious disease.  Patient grew staph in her blood she was started on vancomycin and meropenem..   Patient will be transfer to  Day of Discharge     Vital  Signs:  Temp:  [97.4 °F (36.3 °C)-98.5 °F (36.9 °C)] 98.2 °F (36.8 °C)  Heart Rate:  [103-108] 103  Resp:  [16-18] 16  BP: ()/(51-72) 106/60  Physical Exam:     General  Patient is alert oriented cooperative   HEENT:  Atraumatic, PERRLA  Neck:  Supple, No Lymphadenopathy  Cardiovascular:  PMI  Lungs:  Normal Breath Sounds, No  Wheezes.   Abdomen:  Normal BS all 4 Quadrants, No Guarding, No Tenderness  Lymphatic:  No Neck, No Axillae, No Groin  Extremities:  Pulses Positive all 4 Ext, No Edema  Neurological: Lower extremity weakness, normal reflexes.  Psychological:  Mood Normal, Affect Normal  Skin:  No Cellulitis, No Erythema          Discharge Details        Discharge Medications      ASK your doctor about these medications      Instructions Start Date   Cyanocobalamin 1000 MCG/ML kit  Ask about: Which instructions should I use?   1,000 mcg, Injection, Every 7 Days, Pt does on Wednesday      cyclobenzaprine 10 MG tablet  Commonly known as: FLEXERIL   10 mg, Oral, 3 Times Daily PRN      HYDROcodone-acetaminophen 5-325 MG per tablet  Commonly known as: NORCO   1 tablet, Oral, Every 6 Hours PRN, DNF 10/22/2021      ketorolac 10 MG tablet  Commonly known as: TORADOL   10 mg, Oral, Every 6 Hours PRN      omeprazole 20 MG capsule  Commonly known as: priLOSEC   20 mg, Oral, Daily      pregabalin 200 MG capsule  Commonly known as: LYRICA   200 mg, Oral, 3 Times Daily, DNF 11/28/2020      Tylenol 8 Hour Arthritis Pain 650 MG 8 hr tablet  Generic drug: acetaminophen   650 mg, Oral, Nightly PRN      vitamin D 1.25 MG (49121 UT) capsule capsule  Commonly known as: ERGOCALCIFEROL   50,000 Units, Oral, Every 7 Days, Pt does on Wednesday             Allergies   Allergen Reactions   • Latex Itching   • Adhesive Tape Rash       Discharge Disposition:      Diet:  Hospital:  Diet Order   Procedures   • Diet Regular; Consistent Carbohydrate       Discharge Activity:       CODE STATUS:  Code Status and Medical Interventions:    Ordered at: 11/03/21 0908     Level Of Support Discussed With:    Patient     Code Status (Patient has no pulse and is not breathing):    CPR (Attempt to Resuscitate)     Medical Interventions (Patient has pulse or is breathing):    Full Support         Future Appointments   Date Time Provider Department Center   11/5/2021  2:00 PM FRANCY FRIED DEXA 1  FRANCY [facility]   12/6/2021  2:00 PM Evy Rutherford, APRN [facility] PM EAS[facility]           Pertinent  and/or Most Recent Results     PROCEDURES:   MRI of the spine    LAB RESULTS:      Lab 11/03/21  0501 11/03/21 0138 11/02/21 2259 11/02/21 2051   WBC  --  13.26*  --  16.25*   HEMOGLOBIN  --  9.3*  --  10.6*   HEMATOCRIT  --  27.6*  --  31.0*   PLATELETS  --  313  --  347   NEUTROS ABS  --  10.03*  --  12.17*   IMMATURE GRANS (ABS)  --  0.59*  --  0.75*   LYMPHS ABS  --  1.40  --  1.84   MONOS ABS  --  1.16*  --  1.41*   EOS ABS  --  0.03  --  0.03   MCV  --  87.6  --  87.8   SED RATE  --  78*  --  96*   CRP  --  30.99*  --  34.77*   LACTATE  --   --  1.1  --    *  --   --   --    PROTIME 11.1 11.6  --   --          Lab 11/03/21  0501 11/03/21 0138 11/02/21 2051   SODIUM 131* 131* 123*   POTASSIUM 3.2* 3.3* 2.7*   CHLORIDE 98 98 87*   CO2 18.8* 19.2* 21.3*   ANION GAP 14.2 13.8 14.7   BUN 6 6 7   CREATININE 0.38* 0.37* 0.50*   GLUCOSE 100* 114* 114*   CALCIUM 8.1* 7.4* 8.2*   MAGNESIUM 1.9 1.9 1.1*   PHOSPHORUS 2.8 3.0  --    HEMOGLOBIN A1C 6.38*  --   --          Lab 11/03/21  0501 11/02/21 2051   TOTAL PROTEIN 6.1 6.8   ALBUMIN 2.30* 2.60*   GLOBULIN 3.8 4.2   ALT (SGPT) 36* 42*   AST (SGOT) 25 24   BILIRUBIN 0.6 0.8   ALK PHOS 91 112         Lab 11/03/21  0501 11/03/21  0138 11/02/21 2051   TROPONIN T  --   --  <0.010   PROTIME 11.1 11.6  --    INR 1.01* 1.07*  --          Lab 11/03/21  0501   CHOLESTEROL 75   LDL CHOL 41   HDL CHOL 14*   TRIGLYCERIDES 104         Lab 11/03/21  0501   IRON 18*   IRON SATURATION 10*   TIBC 180*   TRANSFERRIN 121*   FERRITIN  394.40*   FOLATE 2.47*         Brief Urine Lab Results  (Last result in the past 365 days)      Color   Clarity   Blood   Leuk Est   Nitrite   Protein   CREAT   Urine HCG        11/02/21 2254 Yellow   Cloudy   Negative   Large (3+)   Negative   Trace               Microbiology Results (last 10 days)     Procedure Component Value - Date/Time    Blood Culture - Blood, Arm, Left [[id number]]  (Abnormal) Collected: 11/02/21 2259    Lab Status: Preliminary result Specimen: Blood from Arm, Left Updated: 11/03/21 1239     Blood Culture Abnormal Stain     Gram Stain Aerobic Bottle Gram positive cocci in clusters    Blood Culture ID, PCR - Blood, Arm, Right [[id number]]  (Abnormal) Collected: 11/02/21 2259    Lab Status: Final result Specimen: Blood from Arm, Right Updated: 11/03/21 1305     BCID, PCR Staph aureus. mecA/C and MREJ (methicillin resistance gene) NOT detected. Identification by BCID2 PCR    Narrative:      Infectious disease consultation is highly recommended to rule out distant foci of infection.    Urine Culture - Urine, Urine, Catheter In/Out [[id number]]  (Abnormal) Collected: 11/02/21 2254    Lab Status: Preliminary result Specimen: Urine, Catheter In/Out Updated: 11/03/21 2115     Urine Culture >100,000 CFU/mL Staphylococcus aureus     Comment: Staphylococcus aureus is not typically regarded as a uropathogen, except in cases with genitourinary instrumentation present.  It's presence suggests an alternate source (e.g. bloodstream, abscess, SSTI, etc.).  Please evaluate accordingly.                              Labs Pending at Discharge:  Pending Labs     Order Current Status    Blood Culture - Blood, Arm, Right In process    Cyclic Citrul Peptide Antibody, IgG / IgA In process    Blood Culture - Blood, Arm, Left Preliminary result    Urine Culture - Urine, Urine, Catheter In/Out Preliminary result            Time spent on Discharge including face to face service:  80 minutes     Electronically signed by  Yeny Villegas MD, 11/04/21, 12:08 AM EDT.

## 2021-11-04 NOTE — NURSING NOTE
Patient being transferred to [facility] via [facility]. Report given to Ellen at [facility].    Brea Christianson RN

## 2021-11-05 ENCOUNTER — APPOINTMENT (OUTPATIENT)
Dept: BONE DENSITY | Facility: HOSPITAL | Age: 51
End: 2021-11-05

## 2021-11-05 LAB
BACTERIA SPEC AEROBE CULT: ABNORMAL
BACTERIA SPEC AEROBE CULT: ABNORMAL
CCP IGA+IGG SERPL IA-ACNC: 25 UNITS (ref 0–19)
GRAM STN SPEC: ABNORMAL
ISOLATED FROM: ABNORMAL
ISOLATED FROM: ABNORMAL

## 2021-11-19 ENCOUNTER — TELEPHONE (OUTPATIENT)
Dept: PAIN MEDICINE | Facility: CLINIC | Age: 51
End: 2021-11-19

## 2021-11-19 RX ORDER — PREGABALIN 200 MG/1
200 CAPSULE ORAL 3 TIMES DAILY
Qty: 90 CAPSULE | Refills: 0 | Status: SHIPPED | OUTPATIENT
Start: 2021-11-19 | End: 2021-12-20

## 2021-11-19 RX ORDER — HYDROCODONE BITARTRATE AND ACETAMINOPHEN 5; 325 MG/1; MG/1
1 TABLET ORAL EVERY 6 HOURS PRN
Qty: 120 TABLET | Refills: 0 | Status: SHIPPED | OUTPATIENT
Start: 2021-11-19 | End: 2022-01-11 | Stop reason: SDUPTHER

## 2021-11-19 NOTE — TELEPHONE ENCOUNTER
After review of her POLLY report and since she was in the hospital for a week she should not need her refill until 11/28/2021.  Will send refills with DNF date. Please notify patient.

## 2021-11-19 NOTE — TELEPHONE ENCOUNTER
Caller: ARTI STARR    Relationship to patient: SELF    Best call back number:  [phone]    Chief complaint:PATIENT HAS F/U APPT. WITH MOLLY RIVERA ON 12/6/21 AND ASKED IF COULD BE CHANGED TO A VIRTUAL VISIT. PATIENT WAS JUST DISCHARGED FROM [facility] AND IS ON A WALKER AND NOT SUPPOSED TO DRIVE AND GET OUT.    Type of visit: F/U    Requested date:  12/6/21    If rescheduling, when is the original appointment:  12/6/21

## 2021-11-23 ENCOUNTER — PRIOR AUTHORIZATION (OUTPATIENT)
Dept: PAIN MEDICINE | Facility: CLINIC | Age: 51
End: 2021-11-23

## 2021-11-23 NOTE — TELEPHONE ENCOUNTER
Ana Maria Story (Baptiste: [id number]) - [id number]  HYDROcodone-Acetaminophen 5-325MG tablets       Status: PA Request    Created: November 23rd, 2021 [phone]    Sent: November 23rd, 2021    Open  Archive

## 2021-11-23 NOTE — TELEPHONE ENCOUNTER
Ana Maria Story (Baptiste: [id number]) - [id number]  HYDROcodone-Acetaminophen 5-325MG tablets       Status: PA Response - Approved    Created: November 23rd, 2021 [phone]    Sent: November 23rd, 2021    Open  Archive

## 2021-12-20 RX ORDER — PREGABALIN 200 MG/1
CAPSULE ORAL
Qty: 90 CAPSULE | Refills: 0 | Status: SHIPPED | OUTPATIENT
Start: 2021-12-20 | End: 2022-01-11 | Stop reason: SDUPTHER

## 2022-01-03 ENCOUNTER — TELEPHONE (OUTPATIENT)
Dept: PAIN MEDICINE | Facility: CLINIC | Age: 52
End: 2022-01-03

## 2022-01-03 NOTE — TELEPHONE ENCOUNTER
Caller: ARTI STARR    Relationship to patient: SELF    Best call back number: [phone]    Chief complaint: FOLLOW UP BACK PAIN    Type of visit: FOLLOW UP BACK PAIN    Requested date: ASAP    If rescheduling, when is the original appointment: 01/31/2021    Additional notes: PATIENT WANTED TO HAVE A VIDEO VISIT IF POSSIBILITY DUE TO DEBILITATING CONDITION.

## 2022-01-11 DIAGNOSIS — G89.4 CHRONIC PAIN SYNDROME: Primary | ICD-10-CM

## 2022-01-11 RX ORDER — HYDROCODONE BITARTRATE AND ACETAMINOPHEN 5; 325 MG/1; MG/1
1 TABLET ORAL EVERY 6 HOURS PRN
Qty: 120 TABLET | Refills: 0 | Status: SHIPPED | OUTPATIENT
Start: 2022-01-11 | End: 2022-03-01 | Stop reason: SDUPTHER

## 2022-01-11 RX ORDER — PREGABALIN 200 MG/1
200 CAPSULE ORAL 3 TIMES DAILY
Qty: 90 CAPSULE | Refills: 0 | Status: SHIPPED | OUTPATIENT
Start: 2022-01-11 | End: 2022-03-01 | Stop reason: SDUPTHER

## 2022-01-17 RX ORDER — HYDROCODONE BITARTRATE AND ACETAMINOPHEN 10; 325 MG/1; MG/1
1 TABLET ORAL 4 TIMES DAILY PRN
Qty: 120 TABLET | Refills: 0 | Status: SHIPPED | OUTPATIENT
Start: 2022-01-17 | End: 2022-03-01

## 2022-01-31 ENCOUNTER — TELEPHONE (OUTPATIENT)
Dept: PAIN MEDICINE | Facility: CLINIC | Age: 52
End: 2022-01-31

## 2022-01-31 ENCOUNTER — TELEMEDICINE (OUTPATIENT)
Dept: PAIN MEDICINE | Facility: CLINIC | Age: 52
End: 2022-01-31

## 2022-01-31 DIAGNOSIS — G89.4 CHRONIC PAIN SYNDROME: ICD-10-CM

## 2022-01-31 DIAGNOSIS — Z79.899 ENCOUNTER FOR LONG-TERM (CURRENT) USE OF MEDICATIONS: Primary | ICD-10-CM

## 2022-01-31 DIAGNOSIS — A49.01 STAPH AUREUS INFECTION: ICD-10-CM

## 2022-01-31 DIAGNOSIS — G62.9 PERIPHERAL POLYNEUROPATHY: ICD-10-CM

## 2022-01-31 DIAGNOSIS — M53.3 SACROILIAC JOINT DYSFUNCTION OF BOTH SIDES: ICD-10-CM

## 2022-01-31 DIAGNOSIS — Z98.890 S/P LAMINECTOMY: ICD-10-CM

## 2022-01-31 PROCEDURE — 99214 OFFICE O/P EST MOD 30 MIN: CPT | Performed by: NURSE PRACTITIONER

## 2022-01-31 NOTE — TELEPHONE ENCOUNTER
Patient states her pharmacy sent a PA over to our office for her Lyrica. Can someone check on this please. Thanks, TT

## 2022-01-31 NOTE — PROGRESS NOTES
"TELEMEDICINE - VIDEO VISIT    You have chosen to receive care through a telehealth visit.  Do you consent to use a video/audio connection for your medical care today? Yes    Location of patient:Private Residence  Location of Provider:[facility]   Anyone else present-Yes, if yes- who- Grandchildren, permission obtained to discuss care with Grandchildren present.   Type of Technology used- ZOOM through use of Epic[employer] visit    CHIEF COMPLAINT: Back pain    Subjective   Ana Maria Story is a 51 y.o. female  who presents for a video visit follow-up.She has a history of back pain with epidural abscesses and iliopsoas abscesses diagnosed on 11/2/2021 (discharge summary reviewed). She was treated at [facility] and underwent a laminectomy of T6, T12, and L2 with evacuation of epidural abscesses on 11/4/2021.  She also had bilateral transgluteal drainage of pelvic abscesses on 11/5/2021.  She was discharged on 11/12/2021.    Today her pain is 2-3/10VAS in severity. She states that her pain is improving.  She now has intermittent periods of severe pain mixed with intermittent periods of mild pain. She states that housework remains difficult (sweeping, mopping, and vacuuming).      She continues with Norco 5/325 4/day with Norco 10/325 at bedtime (MEDD of 30 mg) and Lyrica 200mg 2-3/day. This medication regimen decreases her pain by \"quite a bit\".  She states that if she waits until her pain is severe it doesn't help at all. She denies any side effects.     She continues on oral antibiotics.  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      Peripheral Neuropathy  This is a chronic problem. The current episode started more than 1 year ago. The problem occurs constantly. The problem has been gradually worsening. Associated symptoms include arthralgias, fatigue, joint swelling (left foot), myalgias (fibromyalgia), numbness, a sore throat and weakness. Pertinent negatives include no abdominal " pain, chest pain, chills, congestion, coughing, diaphoresis, fever, headaches, nausea, neck pain or vomiting. Nothing (cold weather) aggravates the symptoms. She has tried NSAIDs and oral narcotics (Norco 5/325, Lyrica 200 mg TID, Flexeril at HS, Elavil 150 mg HS) for the symptoms. The treatment provided moderate relief.   Back Pain  This is a chronic problem. The current episode started more than 1 year ago. The problem occurs constantly. The problem is unchanged. The pain is present in the lumbar spine and thoracic spine. The pain is at a severity of 3/10. The pain is moderate. Associated symptoms include numbness and weakness. Pertinent negatives include no abdominal pain, bladder incontinence, bowel incontinence, chest pain, dysuria, fever or headaches. She has tried analgesics and muscle relaxant (SI injection, norco, flexeril) for the symptoms. The treatment provided moderate relief.        PEG Assessment   What number best describes your pain on average in the past week?5  What number best describes how, during the past week, pain has interfered with your enjoyment of life?4  What number best describes how, during the past week, pain has interfered with your general activity?  4    The following portions of the patient's history were reviewed and updated as appropriate: allergies, current medications, past family history, past medical history, past social history, past surgical history and problem list.    Review of Systems   Constitutional: Positive for fatigue. Negative for chills, diaphoresis and fever.   HENT: Positive for sore throat. Negative for congestion.    Respiratory: Negative for cough.    Cardiovascular: Negative for chest pain.   Gastrointestinal: Negative for abdominal pain, bowel incontinence, nausea and vomiting.   Genitourinary: Negative for bladder incontinence and dysuria.   Musculoskeletal: Positive for arthralgias, back pain, joint swelling (left foot) and myalgias (fibromyalgia).  Negative for neck pain.   Neurological: Positive for weakness and numbness. Negative for headaches.     --  The aforementioned information the Chief Complaint section and above subjective data including any HPI data, and also the Review of Systems data, has been personally reviewed and affirmed.  --    Vitals:    01/31/22 1341   PainSc:   3   PainLoc: Back     Objective   Physical Exam  Constitutional:       Appearance: Normal appearance.   Pulmonary:      Effort: Pulmonary effort is normal.   Neurological:      Mental Status: She is alert.   Psychiatric:         Mood and Affect: Mood normal.         Behavior: Behavior normal.         Thought Content: Thought content normal.         Judgment: Judgment normal.       Assessment/Plan   Diagnoses and all orders for this visit:    1. Encounter for long-term (current) use of medications (Primary)    2. Chronic pain syndrome    3. Staph aureus infection    4. Peripheral polyneuropathy    5. S/P laminectomy    6. Sacroiliac joint dysfunction of both sides      ----------------    Our practice is offering alternative &/or electronic methods to continue to follow our patients while at the same time further the efforts toward social distancing, in accordance with our organizational policies, professional societies' guidance, and gubernatorial mandates.  I support the Healthy at Home campaign and in this visit I have counseled the patient on our needs to limit in-person office visits and physical encounters with medical facilities whenever possible.  I have also educated the patient on the medical necessities of maintaining social distancing while we continue to function during this crisis period.      The patient was counseled on the need to consider telehealth options. The patient was offered the opportunity for a Video Visit using [employer]. The patient agreed to a Video Visit.    ----------------    --- The urine drug screen confirmation from 5/24/2021 has been reviewed and  the result is appropriate based on patient history and POLLY report  --- De-escalate Stokes to 5/325 every 4 hours (Max 5/day).  She plans to utilize half tablets of her Norco 10/325 as she has a significant supply of this currently. She will call when she needs a refill on Stokes 5/325.   ---At next office visit plan to return to Stokes 5/325 every 6 hours PRN if her pain remains controlled.   --- Continue to work with [facility] Neurosurgery specialists  --- Follow-up 1 month or sooner if needed.      POLLY REPORT  As part of the patient's treatment plan, I am prescribing controlled substances. The patient has been made aware of appropriate use of such medications, including potential risk of somnolence, limited ability to drive and/or work safely, and the potential for dependence or overdose. It has also bee made clear that these medications are for use by this patient only, without concomitant use of alcohol or other substances unless prescribed.     Patient has completed prescribing agreement detailing terms of continued prescribing of controlled substances, including monitoring POLLY reports, urine drug screening, and pill counts if necessary. The patient is aware that inappropriate use will results in cessation of prescribing such medications.    As the clinician, I personally reviewed the POLLY from 1/31/2022 while the patient was in the office today.    History and physical exam exhibit continued safe and appropriate use of controlled substances.    -------    EMR Dragon/Transcription disclaimer:   Much of this encounter note is an electronic transcription/translation of spoken language to printed text. The electronic translation of spoken language may permit erroneous, or at times, nonsensical words or phrases to be inadvertently transcribed; Although I have reviewed the note for such errors, some may still exist.        This document is intended for medical expert use only. Reading of this document by  patients and/or patient's family without participating medical staff guidance may result in misinterpretation and unintended morbidity.   Any interpretation of such data is the responsibility of the patient and/or family member responsible for the patient in concert with their primary or specialist providers, not to be left for sources of online searches such as [employer], [employer] or similar queries. Relying on these approaches to knowledge may result in misinterpretation, misguided goals of care and even death should patients or family members try recommendations outside of the realm of professional medical care in a supervised way.

## 2022-03-01 ENCOUNTER — OFFICE VISIT (OUTPATIENT)
Dept: PAIN MEDICINE | Facility: CLINIC | Age: 52
End: 2022-03-01

## 2022-03-01 VITALS
DIASTOLIC BLOOD PRESSURE: 94 MMHG | SYSTOLIC BLOOD PRESSURE: 160 MMHG | BODY MASS INDEX: 20.13 KG/M2 | RESPIRATION RATE: 18 BRPM | HEART RATE: 90 BPM | OXYGEN SATURATION: 98 % | TEMPERATURE: 97.8 F | HEIGHT: 68 IN | WEIGHT: 132.8 LBS

## 2022-03-01 DIAGNOSIS — Z79.899 ENCOUNTER FOR LONG-TERM (CURRENT) USE OF MEDICATIONS: ICD-10-CM

## 2022-03-01 DIAGNOSIS — G89.4 CHRONIC PAIN SYNDROME: Primary | ICD-10-CM

## 2022-03-01 DIAGNOSIS — M53.3 SACROILIAC JOINT DYSFUNCTION OF BOTH SIDES: ICD-10-CM

## 2022-03-01 DIAGNOSIS — G62.9 PERIPHERAL POLYNEUROPATHY: ICD-10-CM

## 2022-03-01 DIAGNOSIS — Z98.890 S/P LAMINECTOMY: ICD-10-CM

## 2022-03-01 PROCEDURE — 80305 DRUG TEST PRSMV DIR OPT OBS: CPT | Performed by: NURSE PRACTITIONER

## 2022-03-01 PROCEDURE — 99214 OFFICE O/P EST MOD 30 MIN: CPT | Performed by: NURSE PRACTITIONER

## 2022-03-01 RX ORDER — OMEPRAZOLE 20 MG/1
20 CAPSULE, DELAYED RELEASE ORAL DAILY
COMMUNITY
Start: 2021-11-04

## 2022-03-01 RX ORDER — ERGOCALCIFEROL 1.25 MG/1
50000 CAPSULE ORAL WEEKLY
COMMUNITY
Start: 2022-02-25

## 2022-03-01 RX ORDER — CEPHALEXIN 500 MG/1
500 CAPSULE ORAL DAILY
COMMUNITY
Start: 2022-01-25

## 2022-03-01 RX ORDER — CYCLOBENZAPRINE HCL 10 MG
10 TABLET ORAL 3 TIMES DAILY PRN
COMMUNITY
End: 2022-03-01 | Stop reason: SDUPTHER

## 2022-03-01 RX ORDER — CYANOCOBALAMIN 1000 UG/ML
1000 INJECTION, SOLUTION INTRAMUSCULAR; SUBCUTANEOUS
COMMUNITY
Start: 2021-11-04

## 2022-03-01 RX ORDER — PREGABALIN 200 MG/1
200 CAPSULE ORAL 3 TIMES DAILY
Qty: 90 CAPSULE | Refills: 5 | Status: SHIPPED | OUTPATIENT
Start: 2022-03-01 | End: 2022-09-01 | Stop reason: SDUPTHER

## 2022-03-01 RX ORDER — CYCLOBENZAPRINE HCL 10 MG
10 TABLET ORAL 3 TIMES DAILY PRN
Qty: 90 TABLET | Refills: 1 | Status: SHIPPED | OUTPATIENT
Start: 2022-03-01 | End: 2022-05-02 | Stop reason: SDUPTHER

## 2022-03-01 RX ORDER — HYDROCODONE BITARTRATE AND ACETAMINOPHEN 5; 325 MG/1; MG/1
TABLET ORAL
Qty: 150 TABLET | Refills: 0 | Status: SHIPPED | OUTPATIENT
Start: 2022-03-01 | End: 2022-04-05 | Stop reason: SDUPTHER

## 2022-03-01 NOTE — PROGRESS NOTES
"CHIEF COMPLAINT  Follow-up for back and nerve pain.    Subjective   Ana Maria Story is a 51 y.o. female  who presents for follow-up.  She has a history of chronic back pain.    She has a history of back pain with epidural abscesses and iliopsoas abscesses diagnosed on 11/2/2021. She was treated at [facility] and underwent a laminectomy of T6, T12, and L2 with evacuation of epidural abscesses on 11/4/2021.  She also had bilateral transgluteal drainage of pelvic abscesses on 11/5/2021.  She was discharged on 11/12/2021.  She continues on oral antibiotics.  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      Today her pain is 4/10VAS in severity. She states that her pain is improving, she is getting through the day without needing multiple naps, she is tolerating more physical activities. She continues with Norco 5/325 4-5/day and Lyrica 200mg 2-3/day. This medication regimen decreases her pain by \"quite a bit\".  She denies any side effects.     Patient remained masked during entire encounter. No cough present. I donned a mask and eye protection throughout entire visit. Prior to donning mask and eye protection, hand hygiene was performed, as well as when it was doffed.  I was closer than 6 feet, but not for an extended period of time. No obvious exposure to any bodily fluids.    Peripheral Neuropathy  This is a chronic problem. The current episode started more than 1 year ago. The problem occurs constantly. The problem has been waxing and waning. Associated symptoms include arthralgias (right foot), coughing, fatigue, myalgias (fibromyalgia), numbness (bilateral hands and feet) and weakness. Pertinent negatives include no abdominal pain, chest pain, chills, congestion, diaphoresis, fever, headaches, joint swelling, nausea, neck pain or vomiting. Nothing (cold weather) aggravates the symptoms. She has tried NSAIDs and oral narcotics (Tramadol 50 mg 2 TID Lyrica 200 mg TID, Flexeril at HS, Elavil " 150 mg HS) for the symptoms. The treatment provided moderate relief.   Back Pain  This is a chronic problem. The current episode started more than 1 year ago. The problem occurs constantly. The problem has been waxing and waning since onset. The pain is present in the lumbar spine, sacro-iliac and thoracic spine. The pain is at a severity of 4/10. The pain is moderate. Associated symptoms include numbness (bilateral hands and feet) and weakness. Pertinent negatives include no abdominal pain, bladder incontinence, bowel incontinence, chest pain, dysuria, fever or headaches. She has tried analgesics and muscle relaxant (SI injection--moderate to signficant relief) for the symptoms. The treatment provided moderate relief.      PEG Assessment   What number best describes your pain on average in the past week?6  What number best describes how, during the past week, pain has interfered with your enjoyment of life?6  What number best describes how, during the past week, pain has interfered with your general activity?  6    The following portions of the patient's history were reviewed and updated as appropriate: allergies, current medications, past family history, past medical history, past social history, past surgical history and problem list.    Review of Systems   Constitutional: Positive for fatigue. Negative for chills, diaphoresis and fever.   HENT: Negative for congestion.    Eyes: Negative for visual disturbance.   Respiratory: Positive for cough. Negative for shortness of breath.    Cardiovascular: Negative for chest pain.   Gastrointestinal: Negative for abdominal pain, bowel incontinence, constipation, diarrhea, nausea and vomiting.   Genitourinary: Negative for bladder incontinence, difficulty urinating and dysuria.   Musculoskeletal: Positive for arthralgias (right foot), back pain and myalgias (fibromyalgia). Negative for joint swelling and neck pain.   Neurological: Positive for weakness and numbness  "(bilateral hands and feet). Negative for headaches.   Psychiatric/Behavioral: Positive for sleep disturbance. Negative for suicidal ideas. The patient is not nervous/anxious.      I have reviewed and confirmed the accuracy of the ROS as documented by the MA/LPN/RN MIGUEL Ferrer    Vitals:    03/01/22 1027   BP: 160/94   Pulse: 90   Resp: 18   Temp: 97.8 °F (36.6 °C)   SpO2: 98%   Weight: 60.2 kg (132 lb 12.8 oz)   Height: 172.7 cm (68\")   PainSc:   4   PainLoc: Foot         Objective   Physical Exam  Vitals and nursing note reviewed.   Constitutional:       General: She is not in acute distress.     Appearance: Normal appearance. She is not ill-appearing.   Pulmonary:      Effort: Pulmonary effort is normal. No respiratory distress.   Musculoskeletal:      Lumbar back: Tenderness and bony tenderness present.   Skin:     General: Skin is warm and dry.   Neurological:      Mental Status: She is alert and oriented to person, place, and time.      Motor: Motor function is intact. No weakness.      Gait: Gait normal.   Psychiatric:         Mood and Affect: Mood normal.         Behavior: Behavior normal.             Assessment/Plan   Diagnoses and all orders for this visit:    1. Chronic pain syndrome (Primary)  -     HYDROcodone-acetaminophen (NORCO) 5-325 MG per tablet; Take one po every 4-6 hours prn severe pain.  Dispense: 150 tablet; Refill: 0  -     pregabalin (LYRICA) 200 MG capsule; Take 1 capsule by mouth 3 (Three) Times a Day.  Dispense: 90 capsule; Refill: 5  -     Urine Drug Screen Confirmation - Urine, Clean Catch; Future  -     POC Urine Drug Screen, Triage    2. S/P laminectomy  -     Urine Drug Screen Confirmation - Urine, Clean Catch; Future  -     POC Urine Drug Screen, Triage    3. Sacroiliac joint dysfunction of both sides  -     Urine Drug Screen Confirmation - Urine, Clean Catch; Future  -     POC Urine Drug Screen, Triage    4. Peripheral polyneuropathy  -     Urine Drug Screen " Confirmation - Urine, Clean Catch; Future  -     POC Urine Drug Screen, Triage    5. Encounter for long-term (current) use of medications  -     Urine Drug Screen Confirmation - Urine, Clean Catch; Future  -     POC Urine Drug Screen, Triage    Other orders  -     cyclobenzaprine (FLEXERIL) 10 MG tablet; Take 1 tablet by mouth 3 (Three) Times a Day As Needed for Muscle Spasms.  Dispense: 90 tablet; Refill: 1      --- Refill hydrocodone 5/325, will keep at 5/day for the next month.  Patient appears stable with current regimen. No adverse effects. Regarding continuation of opioids, there is no evidence of aberrant behavior or any red flags.  The patient continues with appropriate response to opioid therapy. ADL's remain intact by self.   --- Routine UDS in office today as part of monitoring requirements for controlled substances.  The specimen was viewed and the immunoassay result reviewed and is +OPI.  This specimen will be sent to [facility] laboratory for confirmation.     --- The patient signed an updated copy of the controlled substance agreement on 5/24/2021  --- Follow-up 1 month          POLLY REPORT  As part of the patient's treatment plan, I am prescribing controlled substances. The patient has been made aware of appropriate use of such medications, including potential risk of somnolence, limited ability to drive and/or work safely, and the potential for dependence or overdose. It has also bee made clear that these medications are for use by this patient only, without concomitant use of alcohol or other substances unless prescribed.     Patient has completed prescribing agreement detailing terms of continued prescribing of controlled substances, including monitoring POLLY reports, urine drug screening, and pill counts if necessary. The patient is aware that inappropriate use will results in cessation of prescribing such medications.    As the clinician, I personally reviewed the POLLY from 3/1/2022 while  the patient was in the office today.    History and physical exam exhibit continued safe and appropriate use of controlled substances.     Dictated utilizing Dragon dictation.     This document is intended for medical expert use only. Reading of this document by patients and/or patient's family without participating medical staff guidance may result in misinterpretation and unintended morbidity.   Any interpretation of such data is the responsibility of the patient and/or family member responsible for the patient in concert with their primary or specialist providers, not to be left for sources of online searches such as [employer], [employer] or similar queries. Relying on these approaches to knowledge may result in misinterpretation, misguided goals of care and even death should patients or family members try recommendations outside of the realm of professional medical care in a supervised way.

## 2022-03-21 ENCOUNTER — TELEPHONE (OUTPATIENT)
Dept: PAIN MEDICINE | Facility: CLINIC | Age: 52
End: 2022-03-21

## 2022-03-21 NOTE — TELEPHONE ENCOUNTER
Caller: Ana Maria Story    Relationship to patient: Self    Best call back number: [phone]    Patient is needing: CALLBACK ASAP.  PATIENT IS RAISING HER GRANDCHILDREN AND NEEDS A NOTE STATING THAT SHE IS CAPABLE OF CARING FOR THE CHILDREN WHILE ON HER PRESCRIBED MEDICATIONS  PATIENT GOES TO COURT WED, 3.23. AND WOULD LIKE NOTE BY THEN      UNABLE TO WARM TRANSFER

## 2022-03-22 NOTE — TELEPHONE ENCOUNTER
Sure, we need to know how much detail about her medical conditions she wants or does not want in such a note.  Not leaving us much time to prepare something.  Just don't want to cause any problems with anything inappropriate in such a note.

## 2022-03-30 DIAGNOSIS — G89.4 CHRONIC PAIN SYNDROME: ICD-10-CM

## 2022-03-31 RX ORDER — HYDROCODONE BITARTRATE AND ACETAMINOPHEN 5; 325 MG/1; MG/1
TABLET ORAL
Qty: 150 TABLET | Refills: 0 | OUTPATIENT
Start: 2022-03-31

## 2022-04-05 ENCOUNTER — OFFICE VISIT (OUTPATIENT)
Dept: PAIN MEDICINE | Facility: CLINIC | Age: 52
End: 2022-04-05

## 2022-04-05 VITALS
BODY MASS INDEX: 21.37 KG/M2 | HEART RATE: 82 BPM | RESPIRATION RATE: 12 BRPM | TEMPERATURE: 97.2 F | DIASTOLIC BLOOD PRESSURE: 98 MMHG | SYSTOLIC BLOOD PRESSURE: 167 MMHG | WEIGHT: 141 LBS | OXYGEN SATURATION: 98 % | HEIGHT: 68 IN

## 2022-04-05 DIAGNOSIS — M53.3 SACROILIAC JOINT DYSFUNCTION OF BOTH SIDES: ICD-10-CM

## 2022-04-05 DIAGNOSIS — A49.01 STAPH AUREUS INFECTION: ICD-10-CM

## 2022-04-05 DIAGNOSIS — Z79.899 ENCOUNTER FOR LONG-TERM (CURRENT) USE OF MEDICATIONS: ICD-10-CM

## 2022-04-05 DIAGNOSIS — G62.9 PERIPHERAL POLYNEUROPATHY: ICD-10-CM

## 2022-04-05 DIAGNOSIS — G89.4 CHRONIC PAIN SYNDROME: Primary | ICD-10-CM

## 2022-04-05 DIAGNOSIS — Z98.890 S/P LAMINECTOMY: ICD-10-CM

## 2022-04-05 PROCEDURE — 99214 OFFICE O/P EST MOD 30 MIN: CPT | Performed by: NURSE PRACTITIONER

## 2022-04-05 RX ORDER — HYDROCODONE BITARTRATE AND ACETAMINOPHEN 5; 325 MG/1; MG/1
TABLET ORAL
Qty: 135 TABLET | Refills: 0 | Status: SHIPPED | OUTPATIENT
Start: 2022-04-05 | End: 2022-05-02 | Stop reason: SDUPTHER

## 2022-04-05 NOTE — PROGRESS NOTES
CHIEF COMPLAINT  FOLLOW UP BACK AND PERIPHERAL NEUROPATHY  1 Month evaluation- patient in office  today reports her pain has improved up to 50% since her last O/V.     Subjective   Ana Maria Story is a 51 y.o. female  who presents for follow-up.  She has a history of back pain.    Diagnosed with epidural abscesses and iliopsoas abscesses diagnosed on 11/2/2021 (discharge summary reviewed). She was treated at [facility] and underwent a laminectomy of T6, T12, and L2 with evacuation of epidural abscesses on 11/4/2021.  She also had bilateral transgluteal drainage of pelvic abscesses on 11/5/2021.  She was discharged on 11/12/2021. She continues on oral antibiotics and will follow up with [facility] ID on 4/27/2022. She has been released by neurosurgery.     Today her pain is 5/10VAS in severity. She continues with Norco 5/325 4-5/day (states that she needs the 5th dose ~1/week) and Lyrica 200 mg 2-3/day. This medication regimen decreases her pain and allows her to remain active and care for her grandchildren. ADLs by self.     She now has permanent custody of her grandchildren and is their primary caregiver.     Patient remained masked during entire encounter. No cough present. I donned a mask and eye protection throughout entire visit. Prior to donning mask and eye protection, hand hygiene was performed, as well as when it was doffed.  I was closer than 6 feet, but not for an extended period of time. No obvious exposure to any bodily fluids.    Peripheral Neuropathy  This is a chronic problem. The current episode started more than 1 year ago. The problem occurs constantly. The problem has been gradually worsening. Associated symptoms include arthralgias, fatigue, joint swelling (left foot), myalgias (fibromyalgia), numbness (hands and feet), a sore throat and weakness. Pertinent negatives include no abdominal pain, chest pain, chills, congestion, coughing, diaphoresis, fever, headaches, nausea, neck pain or vomiting. Nothing  (cold weather) aggravates the symptoms. She has tried NSAIDs and oral narcotics (Norco 5/325, Lyrica 200 mg TID, Flexeril at HS, Elavil 150 mg HS) for the symptoms. The treatment provided moderate relief.   Back Pain  This is a chronic problem. The current episode started more than 1 year ago. The problem occurs constantly. The problem is unchanged. The pain is present in the lumbar spine and thoracic spine. The pain is at a severity of 5/10. The pain is moderate. Associated symptoms include numbness (hands and feet) and weakness. Pertinent negatives include no abdominal pain, bladder incontinence, bowel incontinence, chest pain, dysuria, fever or headaches. She has tried analgesics and muscle relaxant (SI injection, norco, flexeril) for the symptoms. The treatment provided moderate relief.      PEG Assessment   What number best describes your pain on average in the past week?8  What number best describes how, during the past week, pain has interfered with your enjoyment of life?8  What number best describes how, during the past week, pain has interfered with your general activity?  8    The following portions of the patient's history were reviewed and updated as appropriate: allergies, current medications, past family history, past medical history, past social history, past surgical history and problem list.    Review of Systems   Constitutional: Positive for fatigue. Negative for activity change, chills, diaphoresis and fever.   HENT: Positive for sore throat. Negative for congestion.    Eyes: Positive for visual disturbance (blurred).   Respiratory: Negative for cough and chest tightness.    Cardiovascular: Negative for chest pain.   Gastrointestinal: Negative for abdominal pain, bowel incontinence, constipation, diarrhea, nausea and vomiting.   Genitourinary: Negative for bladder incontinence, difficulty urinating and dysuria.   Musculoskeletal: Positive for arthralgias, back pain, joint swelling (left foot) and  "myalgias (fibromyalgia). Negative for neck pain.   Neurological: Positive for weakness and numbness (hands and feet). Negative for dizziness, light-headedness and headaches.   Psychiatric/Behavioral: Positive for sleep disturbance. Negative for agitation and suicidal ideas. The patient is not nervous/anxious.      --  The aforementioned information the Chief Complaint section and above subjective data including any HPI data, and also the Review of Systems data, has been personally reviewed and affirmed.  --    Vitals:    04/05/22 0949   BP: 167/98   BP Location: Right arm   Patient Position: Sitting   Pulse: 82   Resp: 12   Temp: 97.2 °F (36.2 °C)   SpO2: 98%   Weight: 64 kg (141 lb)   Height: 172.7 cm (68\")   PainSc:   5   PainLoc: Back  Comment: peripheral neuropathy     Objective   Physical Exam  Vitals and nursing note reviewed.   Constitutional:       Appearance: Normal appearance. She is well-developed.   Eyes:      General: Lids are normal.   Cardiovascular:      Rate and Rhythm: Normal rate.   Pulmonary:      Effort: Pulmonary effort is normal.   Musculoskeletal:      Cervical back: Normal range of motion.      Thoracic back: Tenderness present. Decreased range of motion.      Lumbar back: Tenderness present. Decreased range of motion.   Skin:         Neurological:      Mental Status: She is alert and oriented to person, place, and time.   Psychiatric:         Attention and Perception: Attention normal.         Mood and Affect: Mood normal.         Speech: Speech normal.         Behavior: Behavior normal.         Judgment: Judgment normal.       Assessment/Plan   Diagnoses and all orders for this visit:    1. Chronic pain syndrome (Primary)  -     HYDROcodone-acetaminophen (NORCO) 5-325 MG per tablet; Take one po every 4-6 hours prn severe pain.  Dispense: 135 tablet; Refill: 0    2. S/P laminectomy    3. Sacroiliac joint dysfunction of both sides    4. Peripheral polyneuropathy    5. Encounter for long-term " (current) use of medications    6. Staph aureus infection      --- The urine drug screen confirmation from 3/1/2022 has been reviewed and the result is appropriate based on patient history and POLLY report  --- CSA updated 5/24/2021  --- Refill Norco 5/325. Will decrease sig to #135. Patient appears stable with current regimen. No adverse effects. Regarding continuation of opioids, there is no evidence of aberrant behavior or any red flags.  The patient continues with appropriate response to opioid therapy. ADL's remain intact by self.   --- Continue Lyrica   --- Follow-up 2 months or sooner if needed.      POLLY REPORT  As part of the patient's treatment plan, I am prescribing controlled substances. The patient has been made aware of appropriate use of such medications, including potential risk of somnolence, limited ability to drive and/or work safely, and the potential for dependence or overdose. It has also bee made clear that these medications are for use by this patient only, without concomitant use of alcohol or other substances unless prescribed.     Patient has completed prescribing agreement detailing terms of continued prescribing of controlled substances, including monitoring POLLY reports, urine drug screening, and pill counts if necessary. The patient is aware that inappropriate use will results in cessation of prescribing such medications.    As the clinician, I personally reviewed the POLLY from 4/5/2022 while the patient was in the office today.    History and physical exam exhibit continued safe and appropriate use of controlled substances.    Dictated utilizing Dragon dictation.     This document is intended for medical expert use only. Reading of this document by patients and/or patient's family without participating medical staff guidance may result in misinterpretation and unintended morbidity.   Any interpretation of such data is the responsibility of the patient and/or family member  responsible for the patient in concert with their primary or specialist providers, not to be left for sources of online searches such as [employer], [employer] or similar queries. Relying on these approaches to knowledge may result in misinterpretation, misguided goals of care and even death should patients or family members try recommendations outside of the realm of professional medical care in a supervised way.

## 2022-05-02 DIAGNOSIS — G89.4 CHRONIC PAIN SYNDROME: ICD-10-CM

## 2022-05-02 RX ORDER — CYCLOBENZAPRINE HCL 10 MG
10 TABLET ORAL 3 TIMES DAILY PRN
Qty: 90 TABLET | Refills: 1 | Status: SHIPPED | OUTPATIENT
Start: 2022-05-02 | End: 2022-07-29

## 2022-05-02 RX ORDER — HYDROCODONE BITARTRATE AND ACETAMINOPHEN 5; 325 MG/1; MG/1
TABLET ORAL
Qty: 135 TABLET | Refills: 0 | Status: SHIPPED | OUTPATIENT
Start: 2022-05-02 | End: 2022-05-31 | Stop reason: SDUPTHER

## 2022-05-31 DIAGNOSIS — G89.4 CHRONIC PAIN SYNDROME: ICD-10-CM

## 2022-06-01 RX ORDER — HYDROCODONE BITARTRATE AND ACETAMINOPHEN 5; 325 MG/1; MG/1
TABLET ORAL
Qty: 135 TABLET | Refills: 0 | Status: SHIPPED | OUTPATIENT
Start: 2022-06-01 | End: 2022-06-29 | Stop reason: SDUPTHER

## 2022-06-01 NOTE — TELEPHONE ENCOUNTER
Called and left pt msg. She has f/u with you on 6/6/22. She should have enough med until appt. Awaiting call back.

## 2022-06-01 NOTE — TELEPHONE ENCOUNTER
Pt returned my call. She sts she will run out of meds prior to appt on 6/6/22 and will need a refill. Please advise. Thank you.

## 2022-06-06 ENCOUNTER — OFFICE VISIT (OUTPATIENT)
Dept: PAIN MEDICINE | Facility: CLINIC | Age: 52
End: 2022-06-06

## 2022-06-06 VITALS
WEIGHT: 147.2 LBS | HEIGHT: 68 IN | HEART RATE: 75 BPM | RESPIRATION RATE: 12 BRPM | TEMPERATURE: 96.5 F | SYSTOLIC BLOOD PRESSURE: 144 MMHG | BODY MASS INDEX: 22.31 KG/M2 | OXYGEN SATURATION: 100 % | DIASTOLIC BLOOD PRESSURE: 81 MMHG

## 2022-06-06 DIAGNOSIS — G62.9 PERIPHERAL POLYNEUROPATHY: ICD-10-CM

## 2022-06-06 DIAGNOSIS — G89.4 CHRONIC PAIN SYNDROME: Primary | ICD-10-CM

## 2022-06-06 DIAGNOSIS — Z98.890 S/P LAMINECTOMY: ICD-10-CM

## 2022-06-06 DIAGNOSIS — M53.3 SACROILIAC JOINT DYSFUNCTION OF BOTH SIDES: ICD-10-CM

## 2022-06-06 DIAGNOSIS — Z79.899 ENCOUNTER FOR LONG-TERM (CURRENT) USE OF MEDICATIONS: ICD-10-CM

## 2022-06-06 PROCEDURE — 99214 OFFICE O/P EST MOD 30 MIN: CPT | Performed by: NURSE PRACTITIONER

## 2022-06-06 NOTE — PROGRESS NOTES
CHIEF COMPLAINT  FOLLOW UP LUMBAR/PERIPHERAL NEUROPATHY PAIN  2 Month evaluation- Patient in office states her pain has continued to remain stabilized over the last 2 months .     Subjective   Ana Maria Story is a 51 y.o. female  who presents for follow-up.  She has a history of back pain and pain related to peripheral neuropathy.  Today her pain is 6/10VAS in severity. She was having some headaches, but states that these are improving. She reports some new balance issues, she feels this may be related to her peripheral neuropathy and being in a hurry. She denies any increasing numbness or tingling.     She continues with Norco 5/325 4-5/day (states she rarely needs the 5th dose) and Lyrica 200 mg 3/day. This medication regimen keeps her pain as a tolerable level and allows her to remain active. ADLs by self. She reports intermittent constipation which is mild. She denies any other side effects.  She has permanent custody and is the primary caregiver of her grandchildren (ages 10 months and 3 yrs).     She continues Oral antibiotics (Keflex 500 mg once daily) and will remain on this until at least April 2023. Patient states this is a maintenance dose.     History of epidural abscesses and iliopsoas abscess diagnosed on 11/2/2021.  Patient underwent laminectomy of T6, T12, and L2 with evacuation of epidural abscess on 11/4/2021.  Patient had bilateral transgluteal drainage of pelvic abscess on 11/5/2021.    Patient remained masked during entire encounter. No cough present. I donned a mask and eye protection throughout entire visit. Prior to donning mask and eye protection, hand hygiene was performed, as well as when it was doffed.  I was closer than 6 feet, but not for an extended period of time. No obvious exposure to any bodily fluids.    Back Pain  This is a chronic problem. The current episode started more than 1 year ago. The problem occurs constantly. The problem is unchanged. The pain is present in the  lumbar spine and thoracic spine. The pain is at a severity of 6/10. The pain is moderate. Associated symptoms include numbness (hands and feet) and weakness. Pertinent negatives include no abdominal pain, bladder incontinence, bowel incontinence, chest pain, dysuria, fever or headaches. She has tried analgesics and muscle relaxant (SI injection, norco, flexeril) for the symptoms. The treatment provided moderate relief.   Peripheral Neuropathy  This is a chronic problem. The current episode started more than 1 year ago. The problem occurs constantly. The problem has been unchanged. Associated symptoms include arthralgias, fatigue, joint swelling (left foot), myalgias (fibromyalgia), numbness (hands and feet), a sore throat and weakness. Pertinent negatives include no abdominal pain, chest pain, chills, congestion, coughing, diaphoresis, fever, headaches, nausea, neck pain or vomiting. Nothing (cold weather) aggravates the symptoms. She has tried NSAIDs and oral narcotics (Norco 5/325, Lyrica 200 mg TID, Flexeril at HS, Elavil 150 mg HS) for the symptoms. The treatment provided moderate relief.      PEG Assessment   What number best describes your pain on average in the past week?10  What number best describes how, during the past week, pain has interfered with your enjoyment of life?10  What number best describes how, during the past week, pain has interfered with your general activity?  10    The following portions of the patient's history were reviewed and updated as appropriate: allergies, current medications, past family history, past medical history, past social history, past surgical history and problem list.    Review of Systems   Constitutional: Positive for activity change and fatigue. Negative for chills, diaphoresis and fever.   HENT: Positive for sore throat. Negative for congestion.    Eyes: Negative for visual disturbance.   Respiratory: Negative for cough and chest tightness.    Cardiovascular:  "Negative for chest pain.   Gastrointestinal: Negative for abdominal pain, bowel incontinence, constipation, diarrhea, nausea and vomiting.   Genitourinary: Negative for bladder incontinence, difficulty urinating and dysuria.   Musculoskeletal: Positive for arthralgias, back pain, joint swelling (left foot) and myalgias (fibromyalgia). Negative for neck pain.   Neurological: Positive for weakness and numbness (hands and feet). Negative for dizziness, light-headedness and headaches.   Psychiatric/Behavioral: Positive for sleep disturbance. Negative for agitation and suicidal ideas. The patient is not nervous/anxious.      --  The aforementioned information the Chief Complaint section and above subjective data including any HPI data, and also the Review of Systems data, has been personally reviewed and affirmed.  --    Vitals:    06/06/22 1017   BP: 144/81   BP Location: Left arm   Patient Position: Sitting   Pulse: 75   Resp: 12   Temp: 96.5 °F (35.8 °C)   SpO2: 100%   Weight: 66.8 kg (147 lb 3.2 oz)   Height: 172.7 cm (68\")   PainSc:   6   PainLoc: Back  Comment: peripheral neuropathy     Objective   Physical Exam  Vitals and nursing note reviewed.   Constitutional:       Appearance: Normal appearance. She is well-developed.   Eyes:      General: Lids are normal.   Cardiovascular:      Rate and Rhythm: Normal rate.   Pulmonary:      Effort: Pulmonary effort is normal.   Musculoskeletal:      Thoracic back: Tenderness present. Decreased range of motion.      Lumbar back: Tenderness present. Decreased range of motion.   Neurological:      Mental Status: She is alert and oriented to person, place, and time.   Psychiatric:         Attention and Perception: Attention normal.         Mood and Affect: Mood normal.         Speech: Speech normal.         Behavior: Behavior normal.         Judgment: Judgment normal.       Assessment & Plan   Diagnoses and all orders for this visit:    1. Chronic pain syndrome (Primary)    2. " S/P laminectomy    3. Sacroiliac joint dysfunction of both sides    4. Peripheral polyneuropathy    5. Encounter for long-term (current) use of medications      --- The urine drug screen confirmation from 3/1/2022 has been reviewed and the result is appropriate based on patient history and POLLY report  --- CSA updated 5/24/2021  --- Continue Norco 5/325. No refill needed. Patient appears stable with current regimen. No adverse effects. Regarding continuation of opioids, there is no evidence of aberrant behavior or any red flags.  The patient continues with appropriate response to opioid therapy. ADL's remain intact by self.   --- Continue Lyrica 200 mg. No refill needed.   --- Follow-up 2 months or sooner if needed.      POLLY REPORT  As part of the patient's treatment plan, I am prescribing controlled substances. The patient has been made aware of appropriate use of such medications, including potential risk of somnolence, limited ability to drive and/or work safely, and the potential for dependence or overdose. It has also bee made clear that these medications are for use by this patient only, without concomitant use of alcohol or other substances unless prescribed.     Patient has completed prescribing agreement detailing terms of continued prescribing of controlled substances, including monitoring POLLY reports, urine drug screening, and pill counts if necessary. The patient is aware that inappropriate use will results in cessation of prescribing such medications.    As the clinician, I personally reviewed the POLLY from 6/6/2022 while the patient was in the office today.    History and physical exam exhibit continued safe and appropriate use of controlled substances.    Dictated utilizing Dragon dictation.     This document is intended for medical expert use only. Reading of this document by patients and/or patient's family without participating medical staff guidance may result in misinterpretation and  unintended morbidity.   Any interpretation of such data is the responsibility of the patient and/or family member responsible for the patient in concert with their primary or specialist providers, not to be left for sources of online searches such as [employer], [employer] or similar queries. Relying on these approaches to knowledge may result in misinterpretation, misguided goals of care and even death should patients or family members try recommendations outside of the realm of professional medical care in a supervised way.

## 2022-06-29 DIAGNOSIS — G89.4 CHRONIC PAIN SYNDROME: ICD-10-CM

## 2022-07-01 RX ORDER — HYDROCODONE BITARTRATE AND ACETAMINOPHEN 5; 325 MG/1; MG/1
TABLET ORAL
Qty: 135 TABLET | Refills: 0 | Status: SHIPPED | OUTPATIENT
Start: 2022-07-01 | End: 2022-07-28 | Stop reason: SDUPTHER

## 2022-07-01 NOTE — TELEPHONE ENCOUNTER
Reviewed Evy RIVERA last office visit on 6/6/2022. NAOMI and POLLY reviewed and are appropriate. Due to provider being out of office, will refill appropriately.

## 2022-07-28 DIAGNOSIS — G89.4 CHRONIC PAIN SYNDROME: ICD-10-CM

## 2022-07-29 RX ORDER — HYDROCODONE BITARTRATE AND ACETAMINOPHEN 5; 325 MG/1; MG/1
TABLET ORAL
Qty: 135 TABLET | Refills: 0 | Status: SHIPPED | OUTPATIENT
Start: 2022-07-29 | End: 2022-08-29 | Stop reason: SDUPTHER

## 2022-07-29 RX ORDER — CYCLOBENZAPRINE HCL 10 MG
TABLET ORAL
Qty: 90 TABLET | Refills: 1 | Status: SHIPPED | OUTPATIENT
Start: 2022-07-29 | End: 2022-08-29 | Stop reason: SDUPTHER

## 2022-08-02 ENCOUNTER — TRANSCRIBE ORDERS (OUTPATIENT)
Dept: ADMINISTRATIVE | Facility: HOSPITAL | Age: 52
End: 2022-08-02

## 2022-08-02 DIAGNOSIS — Z12.31 VISIT FOR SCREENING MAMMOGRAM: Primary | ICD-10-CM

## 2022-08-08 ENCOUNTER — OFFICE VISIT (OUTPATIENT)
Dept: PAIN MEDICINE | Facility: CLINIC | Age: 52
End: 2022-08-08

## 2022-08-08 VITALS
OXYGEN SATURATION: 99 % | RESPIRATION RATE: 12 BRPM | HEART RATE: 79 BPM | SYSTOLIC BLOOD PRESSURE: 178 MMHG | WEIGHT: 150.8 LBS | TEMPERATURE: 98 F | HEIGHT: 68 IN | BODY MASS INDEX: 22.85 KG/M2 | DIASTOLIC BLOOD PRESSURE: 96 MMHG

## 2022-08-08 DIAGNOSIS — Z79.899 ENCOUNTER FOR LONG-TERM (CURRENT) USE OF MEDICATIONS: ICD-10-CM

## 2022-08-08 DIAGNOSIS — G62.9 PERIPHERAL POLYNEUROPATHY: ICD-10-CM

## 2022-08-08 DIAGNOSIS — Z98.890 S/P LAMINECTOMY: ICD-10-CM

## 2022-08-08 DIAGNOSIS — M53.3 SACROILIAC JOINT DYSFUNCTION OF BOTH SIDES: ICD-10-CM

## 2022-08-08 DIAGNOSIS — G89.4 CHRONIC PAIN SYNDROME: Primary | ICD-10-CM

## 2022-08-08 PROCEDURE — 99214 OFFICE O/P EST MOD 30 MIN: CPT | Performed by: NURSE PRACTITIONER

## 2022-08-08 PROCEDURE — 80305 DRUG TEST PRSMV DIR OPT OBS: CPT | Performed by: NURSE PRACTITIONER

## 2022-08-08 NOTE — PROGRESS NOTES
CHIEF COMPLAINT  2 month follow up back pain and peripheral neuropathy    Subjective   Ana Maria Story is a 51 y.o. female  who presents for follow-up.  She has a history of back pain and peripheral neuropathy. She reports consistent pain over the last 8 weeks.    Today her pain is 3/10VAS in severity. She continues with Norco 5/325 4-5/day (states she rarely needs the 5th dose) and Lyrica 200 mg 3/day (she does not take her medications if she is going to be driving). This medication regimen decreases her pain by a moderate amount. This medication regimen continues to allow her to care for her grandchildren for which she has custody. She denies any side effects including somnolence or constipation.     History of epidural abscesses and iliopsoas abscess diagnosed on 11/2/2021.  Patient underwent laminectomy of T6, T12, and L2 with evacuation of epidural abscess on 11/4/2021.  Patient had bilateral transgluteal drainage of pelvic abscess on 11/5/2021.    She continues maintenance dose of oral antibiotics (Keflex 500 mg once daily) and will remain on this until at least April 2023.      Back Pain  This is a chronic problem. The current episode started more than 1 year ago. The problem occurs constantly. The problem is unchanged. The pain is present in the lumbar spine and thoracic spine. The pain is at a severity of 3/10. The pain is moderate. Associated symptoms include abdominal pain, headaches, numbness (hands and feet) and weakness. Pertinent negatives include no bladder incontinence, bowel incontinence, chest pain, dysuria or fever. She has tried analgesics and muscle relaxant (SI injection, norco, flexeril) for the symptoms. The treatment provided moderate relief.   Peripheral Neuropathy  This is a chronic problem. The current episode started more than 1 year ago. The problem occurs constantly. The problem has been waxing and waning. Associated symptoms include abdominal pain, arthralgias, headaches, joint  swelling (left foot), myalgias (fibromyalgia), numbness (hands and feet), a sore throat and weakness. Pertinent negatives include no chest pain, chills, congestion, coughing, diaphoresis, fatigue, fever, nausea, neck pain or vomiting. Nothing (cold weather) aggravates the symptoms. She has tried NSAIDs and oral narcotics (Norco 5/325, Lyrica 200 mg TID, Flexeril at HS, Elavil 150 mg HS) for the symptoms. The treatment provided moderate relief.      PEG Assessment   What number best describes your pain on average in the past week?8  What number best describes how, during the past week, pain has interfered with your enjoyment of life?8  What number best describes how, during the past week, pain has interfered with your general activity?  8    The following portions of the patient's history were reviewed and updated as appropriate: allergies, current medications, past family history, past medical history, past social history, past surgical history and problem list.    Review of Systems   Constitutional: Positive for activity change. Negative for chills, diaphoresis, fatigue and fever.   HENT: Positive for sore throat. Negative for congestion.    Respiratory: Negative for cough and chest tightness.    Cardiovascular: Negative for chest pain.   Gastrointestinal: Positive for abdominal pain, constipation and diarrhea. Negative for bowel incontinence, nausea and vomiting.   Genitourinary: Negative for bladder incontinence, difficulty urinating and dysuria.   Musculoskeletal: Positive for arthralgias, back pain, joint swelling (left foot) and myalgias (fibromyalgia). Negative for neck pain.   Neurological: Positive for dizziness (vertigo), weakness, light-headedness, numbness (hands and feet) and headaches.   Psychiatric/Behavioral: Positive for sleep disturbance. Negative for agitation and suicidal ideas. The patient is not nervous/anxious.      --  The aforementioned information the Chief Complaint section and above  "subjective data including any HPI data, and also the Review of Systems data, has been personally reviewed and affirmed.  --    Vitals:    08/08/22 0922   BP: 178/96   BP Location: Right arm   Patient Position: Sitting   Pulse: 79   Resp: 12   Temp: 98 °F (36.7 °C)   SpO2: 99%   Weight: 68.4 kg (150 lb 12.8 oz)   Height: 172.7 cm (68\")   PainSc:   3   PainLoc: Comment: peripheral     Objective   Physical Exam  Vitals and nursing note reviewed.   Constitutional:       Appearance: Normal appearance. She is well-developed.   Eyes:      General: Lids are normal.   Cardiovascular:      Rate and Rhythm: Normal rate.   Pulmonary:      Effort: Pulmonary effort is normal.   Musculoskeletal:      Lumbar back: Tenderness and bony tenderness present. Decreased range of motion.   Neurological:      Mental Status: She is alert and oriented to person, place, and time.   Psychiatric:         Attention and Perception: Attention normal.         Mood and Affect: Mood normal.         Speech: Speech normal.         Behavior: Behavior normal.         Judgment: Judgment normal.       Assessment & Plan   Diagnoses and all orders for this visit:    1. Chronic pain syndrome (Primary)    2. S/P laminectomy    3. Sacroiliac joint dysfunction of both sides    4. Peripheral polyneuropathy    5. Encounter for long-term (current) use of medications      --- Routine UDS in office today as part of monitoring requirements for controlled substances.  The specimen was viewed and the immunoassay result reviewed and is +OPI.  This specimen will be sent to [email or web address] laboratory for confirmation.     --- CSA updated in office today.   --- Continue Norco 5/325. No refill needed. Patient appears stable with current regimen. No adverse effects. Regarding continuation of opioids, there is no evidence of aberrant behavior or any red flags.  The patient continues with appropriate response to opioid therapy. ADL's remain intact by self.   --- Continue Lyrica. "   --- Follow-up 7 weeks or sooner if needed (to align refill dates).      POLLY REPORT  As part of the patient's treatment plan, I am prescribing controlled substances. The patient has been made aware of appropriate use of such medications, including potential risk of somnolence, limited ability to drive and/or work safely, and the potential for dependence or overdose. It has also been made clear that these medications are for use by this patient only, without concomitant use of alcohol or other substances unless prescribed.     Patient has completed prescribing agreement detailing terms of continued prescribing of controlled substances, including monitoring POLLY reports, urine drug screening, and pill counts if necessary. The patient is aware that inappropriate use will results in cessation of prescribing such medications.    As the clinician, I personally reviewed the POLLY from 8/8/2022 while the patient was in the office today.    History and physical exam exhibit continued safe and appropriate use of controlled substances.    Dictated utilizing Dragon dictation.     This document is intended for medical expert use only. Reading of this document by patients and/or patient's family without participating medical staff guidance may result in misinterpretation and unintended morbidity.   Any interpretation of such data is the responsibility of the patient and/or family member responsible for the patient in concert with their primary or specialist providers, not to be left for sources of online searches such as [employer], [employer] or similar queries. Relying on these approaches to knowledge may result in misinterpretation, misguided goals of care and even death should patients or family members try recommendations outside of the realm of professional medical care in a supervised way.    Patient remained masked during entire encounter. No cough present. I donned a mask and eye protection throughout entire visit. Prior  to donning mask and eye protection, hand hygiene was performed, as well as when it was doffed.  I was closer than 6 feet, but not for an extended period of time. No obvious exposure to any bodily fluids.

## 2022-08-29 DIAGNOSIS — G89.4 CHRONIC PAIN SYNDROME: ICD-10-CM

## 2022-08-29 RX ORDER — HYDROCODONE BITARTRATE AND ACETAMINOPHEN 5; 325 MG/1; MG/1
TABLET ORAL
Qty: 135 TABLET | Refills: 0 | Status: SHIPPED | OUTPATIENT
Start: 2022-08-29 | End: 2022-09-26 | Stop reason: SDUPTHER

## 2022-08-29 RX ORDER — CYCLOBENZAPRINE HCL 10 MG
10 TABLET ORAL 3 TIMES DAILY PRN
Qty: 90 TABLET | Refills: 1 | Status: SHIPPED | OUTPATIENT
Start: 2022-08-29 | End: 2022-10-27 | Stop reason: SDUPTHER

## 2022-08-30 ENCOUNTER — APPOINTMENT (OUTPATIENT)
Dept: MAMMOGRAPHY | Facility: HOSPITAL | Age: 52
End: 2022-08-30

## 2022-09-01 ENCOUNTER — TELEPHONE (OUTPATIENT)
Dept: PAIN MEDICINE | Facility: CLINIC | Age: 52
End: 2022-09-01

## 2022-09-01 DIAGNOSIS — G89.4 CHRONIC PAIN SYNDROME: ICD-10-CM

## 2022-09-01 RX ORDER — PREGABALIN 200 MG/1
200 CAPSULE ORAL 3 TIMES DAILY
Qty: 90 CAPSULE | Refills: 1 | Status: SHIPPED | OUTPATIENT
Start: 2022-09-01 | End: 2022-10-27 | Stop reason: SDUPTHER

## 2022-09-01 NOTE — TELEPHONE ENCOUNTER
Medication Refill Request    Date of phone call: 9/01/2022    Medication being requested: Pregabalin 200 mg  sig: Take 1 capsule by mouth 3 (Three) Times a Day.,  Qty: 90    Date of last visit: 8/8/2022    Date of last refill:     POLLY up to date?:     Next Follow up?: 9/26/2022    Any new pertinent information? (i.e, new medication allergies, new use of medications, change in patient's health or condition, non-compliance or inconsistency with prescribing agreement?):   Can you please refill for Evy , patient will run out by today ,Thanks .

## 2022-09-01 NOTE — TELEPHONE ENCOUNTER
Caller: PATIENT     Relationship: ;XANDER     Best call back number: [phone]    Requested Prescriptions: LYRICA 200 MG.        Pharm[facility] where request should be sent:  [facility] [phone]     Additional details provided by patient: PT. GOT HER OTHER MEDS REFILLED ON Monday.   SHE STATES THAT SHE THINKS THE LYRICA REQUEST MAY HAVE BEEN SENT TO [facility] INSTEAD OF [facility].   PT. WILL RUN OUT OF LYRICA TODAY   ASKING IF REFILL CAN BE SENT IN, AND IF OFFICE WILL CALL HER TO ADVISE.    Does the patient have less than a 3 day supply:  [x] Yes  [] No    .”

## 2022-09-26 ENCOUNTER — OFFICE VISIT (OUTPATIENT)
Dept: PAIN MEDICINE | Facility: CLINIC | Age: 52
End: 2022-09-26

## 2022-09-26 VITALS
SYSTOLIC BLOOD PRESSURE: 134 MMHG | WEIGHT: 153 LBS | TEMPERATURE: 97.3 F | DIASTOLIC BLOOD PRESSURE: 84 MMHG | BODY MASS INDEX: 23.19 KG/M2 | RESPIRATION RATE: 16 BRPM | OXYGEN SATURATION: 94 % | HEART RATE: 93 BPM | HEIGHT: 68 IN

## 2022-09-26 DIAGNOSIS — M53.3 SACROILIAC JOINT DYSFUNCTION OF BOTH SIDES: ICD-10-CM

## 2022-09-26 DIAGNOSIS — Z87.39 HISTORY OF OSTEOMYELITIS: ICD-10-CM

## 2022-09-26 DIAGNOSIS — G62.9 PERIPHERAL POLYNEUROPATHY: ICD-10-CM

## 2022-09-26 DIAGNOSIS — G89.4 CHRONIC PAIN SYNDROME: Primary | ICD-10-CM

## 2022-09-26 DIAGNOSIS — A49.01 STAPH AUREUS INFECTION: ICD-10-CM

## 2022-09-26 DIAGNOSIS — Z79.899 ENCOUNTER FOR LONG-TERM (CURRENT) USE OF MEDICATIONS: ICD-10-CM

## 2022-09-26 DIAGNOSIS — Z98.890 S/P LAMINECTOMY: ICD-10-CM

## 2022-09-26 PROCEDURE — 99214 OFFICE O/P EST MOD 30 MIN: CPT | Performed by: NURSE PRACTITIONER

## 2022-09-26 RX ORDER — HYDROCODONE BITARTRATE AND ACETAMINOPHEN 5; 325 MG/1; MG/1
TABLET ORAL
Qty: 135 TABLET | Refills: 0 | Status: SHIPPED | OUTPATIENT
Start: 2022-09-26 | End: 2022-10-27 | Stop reason: SDUPTHER

## 2022-09-26 NOTE — PROGRESS NOTES
"CHIEF COMPLAINT  Follow up for back pain, pt states pain is worse since last ov. Pt denies any fall or injury.     Subjective   Ana Maria Story is a 51 y.o. female  who presents for follow-up.  She has a history of back pain.  Today her pain is 7/10VAS in severity. She states \"I did something to my back 2 weeks ago\".  She was evaluated at her PCP and has had 2 Toradol injections. She states that her pain has improved some, but remains elevated. She states that this does not feel the same as her pain she had with her infection.  She continues on Keflex daily. She did complete lab work with  Her PCP. We will request these records.     She continues with Norco 5/325 4-5/day (has needed 5 more often due to increase pain), Flexeril 10 mg 2-3/day, and Lyrica 200 mg 3/day (she does not take her medications if she is going to be driving). Her medication regimen decreases her pain by \"atleast half\".  ADLs by self. She denies any other side effects including somnolence or constipation.     History of epidural abscesses and iliopsoas abscess diagnosed on 11/2/2021.  Patient underwent laminectomy of T6, T12, and L2 with evacuation of epidural abscess on 11/4/2021.  Patient had bilateral transgluteal drainage of pelvic abscess on 11/5/2021.     She continues maintenance dose of oral antibiotics (Keflex 500 mg once daily) and will remain on this until at least April 2023.     Back Pain  This is a chronic problem. The current episode started more than 1 year ago. The problem occurs constantly. The problem has been gradually worsening since onset. The pain is present in the lumbar spine and thoracic spine. The pain is at a severity of 7/10. The pain is moderate. Associated symptoms include abdominal pain, headaches, numbness and weakness. Pertinent negatives include no bladder incontinence, bowel incontinence, chest pain, dysuria or fever. She has tried analgesics and muscle relaxant (SI injection, norco, flexeril) for the " symptoms. The treatment provided moderate relief.   Peripheral Neuropathy  This is a chronic problem. The current episode started more than 1 year ago. The problem occurs constantly. The problem has been gradually worsening. Associated symptoms include abdominal pain, arthralgias, headaches, joint swelling, myalgias, numbness, a sore throat and weakness. Pertinent negatives include no chest pain, chills, congestion, coughing, diaphoresis, fatigue, fever, nausea, neck pain or vomiting. Nothing (cold weather) aggravates the symptoms. She has tried NSAIDs and oral narcotics (Norco 5/325, Lyrica 200 mg TID, Flexeril at HS, Elavil 150 mg HS) for the symptoms. The treatment provided moderate relief.      PEG Assessment   What number best describes your pain on average in the past week?7  What number best describes how, during the past week, pain has interfered with your enjoyment of life?5  What number best describes how, during the past week, pain has interfered with your general activity?  5    The following portions of the patient's history were reviewed and updated as appropriate: allergies, current medications, past family history, past medical history, past social history, past surgical history and problem list.    Review of Systems   Constitutional: Negative for chills, diaphoresis, fatigue and fever.   HENT: Positive for sore throat. Negative for congestion.    Eyes: Negative for visual disturbance.   Respiratory: Negative for cough and shortness of breath.    Cardiovascular: Negative for chest pain.   Gastrointestinal: Positive for abdominal pain. Negative for bowel incontinence, constipation, diarrhea, nausea and vomiting.   Genitourinary: Negative for bladder incontinence, difficulty urinating, dyspareunia and dysuria.   Musculoskeletal: Positive for arthralgias, back pain, joint swelling and myalgias. Negative for neck pain.   Neurological: Positive for weakness, numbness and headaches. Speech difficulty:  "Bilateral feet and hands.   Psychiatric/Behavioral: Positive for sleep disturbance. Negative for suicidal ideas.     --  The aforementioned information the Chief Complaint section and above subjective data including any HPI data, and also the Review of Systems data, has been personally reviewed and affirmed.  --    Vitals:    09/26/22 0904   BP: 134/84   Pulse: 93   Resp: 16   Temp: 97.3 °F (36.3 °C)   SpO2: 94%   Weight: 69.4 kg (153 lb)   Height: 172.7 cm (67.99\")   PainSc:   7   PainLoc: Back     Objective   Physical Exam  Vitals and nursing note reviewed.   Constitutional:       Appearance: Normal appearance. She is well-developed.   Eyes:      General: Lids are normal.   Cardiovascular:      Rate and Rhythm: Normal rate.   Pulmonary:      Effort: Pulmonary effort is normal.   Musculoskeletal:      Lumbar back: Tenderness present. Decreased range of motion.   Neurological:      Mental Status: She is alert and oriented to person, place, and time.   Psychiatric:         Attention and Perception: Attention normal.         Mood and Affect: Mood normal.         Speech: Speech normal.         Behavior: Behavior normal.         Judgment: Judgment normal.       Assessment & Plan   Diagnoses and all orders for this visit:    1. Chronic pain syndrome (Primary)  -     HYDROcodone-acetaminophen (NORCO) 5-325 MG per tablet; Take one po every 4-6 hours prn severe pain. DNF 10/1/2022  Dispense: 135 tablet; Refill: 0    2. S/P laminectomy    3. Sacroiliac joint dysfunction of both sides    4. Peripheral polyneuropathy    5. Encounter for long-term (current) use of medications    6. Staph aureus infection      --- Discussed increased pain with Dr. Plaza, will update Lumbar MRI and MRI of Pelvis due to worsening pain with a history of epidural abscess and osteomyelitis.   --- The urine drug screen confirmation from 8/8/2022 has been reviewed and the result is appropriate based on patient history and POLLY report  --- CSA " updated 8/8/2022  --- Refill [place] 5/325. DNF 10/1/2022 applied. Patient appears stable with current regimen. No adverse effects. Regarding continuation of opioids, there is no evidence of aberrant behavior or any red flags.  The patient continues with appropriate response to opioid therapy. ADL's remain intact by self.   --- Request recent lab work from PCP.   --- Continue Lyrica and Flexeril, no refill needed.   --- Follow-up 2 months or sooner if needed.      POLLY REPORT  As part of the patient's treatment plan, I am prescribing controlled substances. The patient has been made aware of appropriate use of such medications, including potential risk of somnolence, limited ability to drive and/or work safely, and the potential for dependence or overdose. It has also been made clear that these medications are for use by this patient only, without concomitant use of alcohol or other substances unless prescribed.     Patient has completed prescribing agreement detailing terms of continued prescribing of controlled substances, including monitoring POLLY reports, urine drug screening, and pill counts if necessary. The patient is aware that inappropriate use will results in cessation of prescribing such medications.    As the clinician, I personally reviewed the POLLY from 9/26/2022 while the patient was in the office today.    History and physical exam exhibit continued safe and appropriate use of controlled substances.    Dictated utilizing Dragon dictation.     This document is intended for medical expert use only. Reading of this document by patients and/or patient's family without participating medical staff guidance may result in misinterpretation and unintended morbidity.   Any interpretation of such data is the responsibility of the patient and/or family member responsible for the patient in concert with their primary or specialist providers, not to be left for sources of online searches such as [employer], [employer]  or similar queries. Relying on these approaches to knowledge may result in misinterpretation, misguided goals of care and even death should patients or family members try recommendations outside of the realm of professional medical care in a supervised way.    Patient remained masked during entire encounter. No cough present. I donned a mask and eye protection throughout entire visit. Prior to donning mask and eye protection, hand hygiene was performed, as well as when it was doffed.  I was closer than 6 feet, but not for an extended period of time. No obvious exposure to any bodily fluids.

## 2022-10-25 ENCOUNTER — HOSPITAL ENCOUNTER (OUTPATIENT)
Dept: MRI IMAGING | Facility: HOSPITAL | Age: 52
Discharge: HOME | End: 2022-10-25

## 2022-10-25 DIAGNOSIS — Z87.39 HISTORY OF OSTEOMYELITIS: ICD-10-CM

## 2022-10-25 DIAGNOSIS — Z98.890 S/P LAMINECTOMY: ICD-10-CM

## 2022-10-25 DIAGNOSIS — A49.01 STAPH AUREUS INFECTION: ICD-10-CM

## 2022-10-27 DIAGNOSIS — G89.4 CHRONIC PAIN SYNDROME: ICD-10-CM

## 2022-10-28 RX ORDER — CYCLOBENZAPRINE HCL 10 MG
10 TABLET ORAL 3 TIMES DAILY PRN
Qty: 90 TABLET | Refills: 1 | Status: SHIPPED | OUTPATIENT
Start: 2022-10-28 | End: 2022-12-27 | Stop reason: SDUPTHER

## 2022-10-28 RX ORDER — PREGABALIN 200 MG/1
200 CAPSULE ORAL 3 TIMES DAILY
Qty: 90 CAPSULE | Refills: 1 | Status: SHIPPED | OUTPATIENT
Start: 2022-10-28 | End: 2022-12-27 | Stop reason: SDUPTHER

## 2022-10-28 RX ORDER — HYDROCODONE BITARTRATE AND ACETAMINOPHEN 5; 325 MG/1; MG/1
TABLET ORAL
Qty: 135 TABLET | Refills: 0 | Status: SHIPPED | OUTPATIENT
Start: 2022-10-28 | End: 2022-11-23 | Stop reason: SDUPTHER

## 2022-11-22 ENCOUNTER — HOSPITAL ENCOUNTER (OUTPATIENT)
Dept: MRI IMAGING | Facility: HOSPITAL | Age: 52
Discharge: HOME | End: 2022-11-22

## 2022-11-22 PROCEDURE — 72158 MRI LUMBAR SPINE W/O & W/DYE: CPT

## 2022-11-22 PROCEDURE — 0 GADOBENATE DIMEGLUMINE 529 MG/ML SOLUTION: Performed by: NURSE PRACTITIONER

## 2022-11-22 PROCEDURE — 72197 MRI PELVIS W/O & W/DYE: CPT

## 2022-11-22 PROCEDURE — A9577 INJ MULTIHANCE: HCPCS | Performed by: NURSE PRACTITIONER

## 2022-11-22 RX ADMIN — GADOBENATE DIMEGLUMINE 15 ML: 529 INJECTION, SOLUTION INTRAVENOUS at 13:33

## 2022-11-23 ENCOUNTER — OFFICE VISIT (OUTPATIENT)
Dept: PAIN MEDICINE | Facility: CLINIC | Age: 52
End: 2022-11-23

## 2022-11-23 VITALS
HEART RATE: 81 BPM | DIASTOLIC BLOOD PRESSURE: 86 MMHG | WEIGHT: 150.2 LBS | TEMPERATURE: 98.2 F | RESPIRATION RATE: 16 BRPM | HEIGHT: 68 IN | BODY MASS INDEX: 22.76 KG/M2 | SYSTOLIC BLOOD PRESSURE: 150 MMHG | OXYGEN SATURATION: 98 %

## 2022-11-23 DIAGNOSIS — G89.4 CHRONIC PAIN SYNDROME: ICD-10-CM

## 2022-11-23 DIAGNOSIS — G62.9 PERIPHERAL POLYNEUROPATHY: ICD-10-CM

## 2022-11-23 DIAGNOSIS — Z79.899 ENCOUNTER FOR LONG-TERM (CURRENT) USE OF MEDICATIONS: ICD-10-CM

## 2022-11-23 DIAGNOSIS — M53.3 SACROILIAC JOINT DYSFUNCTION OF BOTH SIDES: ICD-10-CM

## 2022-11-23 DIAGNOSIS — Z87.39 HISTORY OF OSTEOMYELITIS: ICD-10-CM

## 2022-11-23 DIAGNOSIS — Z98.890 S/P LAMINECTOMY: Primary | ICD-10-CM

## 2022-11-23 PROCEDURE — 99214 OFFICE O/P EST MOD 30 MIN: CPT | Performed by: NURSE PRACTITIONER

## 2022-11-23 RX ORDER — HYDROCODONE BITARTRATE AND ACETAMINOPHEN 5; 325 MG/1; MG/1
TABLET ORAL
Qty: 135 TABLET | Refills: 0 | Status: SHIPPED | OUTPATIENT
Start: 2022-11-23 | End: 2022-12-27 | Stop reason: SDUPTHER

## 2022-11-23 RX ORDER — ATORVASTATIN CALCIUM 40 MG/1
TABLET, FILM COATED ORAL
COMMUNITY
Start: 2022-11-20

## 2022-11-23 NOTE — PROGRESS NOTES
CHIEF COMPLAINT    Follow up back pain, lumbar MRI done 11/22/22 (results in chart)    Subjective   Ana Maria Story is a 52 y.o. female  who presents for follow-up.  She has a history of back pain.  Her back pain has been waxing and waning.  She states that with the weather changes and decrease in being able to do her nightly walks because of the cold she has seen an increase in her pain.     Today her pain is 6/10VAS in severity. She continues with Norco 5/325 4-5/day (has needed 5 more often due to increase pain), Flexeril 10 mg 2-3/day, and Lyrica 200 mg 3/day (she does not take her medications if she is going to be driving). This medication regimen decreases her pain by a moderate amount and allows her to remain active in caring for her grandchildren. She denies any side effects including somnolence or constipation.     History of epidural abscesses and iliopsoas abscess diagnosed on 11/2/2021.  Patient underwent laminectomy of T6, T12, and L2 with evacuation of epidural abscess on 11/4/2021. Patient had bilateral transgluteal drainage of pelvic abscess on 11/5/2021.     She continues maintenance dose of oral antibiotics (Keflex 500 mg once daily) and will remain on this until at least April 2023.     Previously did not tolerate baclofen (caused sleep walking)    Back Pain  This is a chronic problem. The current episode started more than 1 year ago. The problem occurs constantly. The problem has been gradually worsening since onset. The pain is present in the lumbar spine and thoracic spine. The pain is at a severity of 6/10. The pain is moderate. Associated symptoms include abdominal pain, headaches, numbness and weakness. Pertinent negatives include no bladder incontinence, bowel incontinence, chest pain, dysuria or fever. She has tried analgesics and muscle relaxant (SI injection, norco, flexeril) for the symptoms. The treatment provided moderate relief.   Peripheral Neuropathy  This is a chronic problem.  The current episode started more than 1 year ago. The problem occurs constantly. The problem has been gradually worsening. Associated symptoms include abdominal pain, arthralgias, congestion, headaches, myalgias, numbness, a sore throat and weakness. Pertinent negatives include no chest pain, chills, coughing, diaphoresis, fatigue, fever, joint swelling, nausea, neck pain or vomiting. Nothing (cold weather) aggravates the symptoms. She has tried NSAIDs and oral narcotics (Norco 5/325, Lyrica 200 mg TID, Flexeril at HS, Elavil 150 mg HS) for the symptoms. The treatment provided moderate relief.      PEG Assessment   What number best describes your pain on average in the past week? 6-7  What number best describes how, during the past week, pain has interfered with your enjoyment of life?5  What number best describes how, during the past week, pain has interfered with your general activity?  6    Review of Pertinent Medical Data ---    PROCEDURE:  MRI LUMBAR SPINE W WO CONTRAST--11/22/2022     COMPARISON: [facility], CT, CT ABDOMEN PELVIS WO CONTRAST, 11/04/2021, 0:55.  [facility], MR, MRI PELVIS W WO CONTRAST, 11/03/2021, 19:47.  [facility], MR,   MRI LUMBAR SPINE W WO CONTRAST, 11/03/2021, 19:47.     INDICATIONS:  increasing low back pain with history of epidural abscess     CONTRAST:      14 mL  Multihance I.V.     TECHNIQUE:    A comprehensive examination was performed utilizing a variety of imaging planes and   imaging parameters to optimize visualization of suspected pathology.  Images were performed before   and after the administration of intravenous gadolinium contrast.     FINDINGS:          Five lumbar vertebral bodies are identified and appear in anatomic alignment.  Vertebral bodies   maintain normal height.  There is mild narrowing and desiccation of the L5-S1 disc, which is   unchanged from the prior study.  There is no focal bone marrow edema or evidence of a  marrow   replacing process. The distal spinal cord and conus medullaris appear unremarkable terminating at   the L1 level. Limited view of the retroperitoneal structures is unremarkable.  There is mild   diffuse atrophy of the paraspinal musculature.  There is irregular area of high T2, low T1 signal   in the left sacral ala and at the iliac side of the left sacroiliac joint.  This is partially   included on the study and may represent residual inflammatory changes or sclerosis from prior   infection.  Previously seen large epidural and paraspinal abscesses have resolved within the field   of view.     L1-L2:  The posterior disc is unremarkable.  There is mild facet hypertrophy.  No neural foraminal   or spinal canal compromise.     L2-L3:  There is slight concentric disc bulge and mild facet hypertrophy without neural foraminal   or spinal canal narrowing.     L3-L4:  There is concentric disc bulge with a small central disc protrusion.  There is mild facet   and ligamentum flavum hypertrophy without neural foraminal or spinal canal compromise.     L4-L5:  There is mild concentric disc bulge and marginal osteophyte formation with mild facet and   ligamentum flavum hypertrophy.  There is mild bilateral neural foraminal narrowing without spinal   canal compromise.     L5-S1:  There is concentric disc bulge and marginal osteophyte formation with mild facet   hypertrophy.  No significant neural foraminal or spinal canal narrowing.      IMPRESSION:                 1. Stable mild lumbar spondylosis resulting in mild neural foraminal narrowing without spinal canal   stenosis.  2. Mild atrophy of the posterior paraspinal musculature.  3. Previously identified epidural and paraspinal abscesses have resolved.  There is persistent   abnormal signal within the left sacral ala and at the sacroiliac joint, which may represent   residual inflammatory or sclerotic changes.     PIERRE MORALES MD         Electronically Signed and  "Approved By: PIERRE MORALES MD on 11/23/2022 at 9:16       The following portions of the patient's history were reviewed and updated as appropriate: allergies, current medications, past family history, past medical history, past social history, past surgical history and problem list.    Review of Systems   Constitutional: Negative for chills, diaphoresis, fatigue and fever.   HENT: Positive for congestion and sore throat.    Eyes: Negative for visual disturbance.   Respiratory: Negative for cough and shortness of breath.    Cardiovascular: Negative for chest pain.   Gastrointestinal: Positive for abdominal pain and constipation. Negative for bowel incontinence, diarrhea, nausea and vomiting.   Genitourinary: Negative for bladder incontinence, difficulty urinating, dyspareunia and dysuria.   Musculoskeletal: Positive for arthralgias, back pain and myalgias. Negative for joint swelling and neck pain.   Neurological: Positive for weakness, numbness and headaches.   Psychiatric/Behavioral: Positive for sleep disturbance. Negative for suicidal ideas.     --  The aforementioned information the Chief Complaint section and above subjective data including any HPI data, and also the Review of Systems data, has been personally reviewed and affirmed.  --    Vitals:    11/23/22 1033   BP: 150/86   Pulse: 81   Resp: 16   Temp: 98.2 °F (36.8 °C)   SpO2: 98%   Weight: 68.1 kg (150 lb 3.2 oz)   Height: 172.7 cm (67.99\")   PainSc:   6   PainLoc: Back     Objective   Physical Exam  Vitals and nursing note reviewed.   Constitutional:       Appearance: Normal appearance. She is well-developed.   Eyes:      General: Lids are normal.   Cardiovascular:      Rate and Rhythm: Normal rate.   Pulmonary:      Effort: Pulmonary effort is normal.   Musculoskeletal:      Lumbar back: Tenderness and bony tenderness present. Decreased range of motion.   Neurological:      Mental Status: She is alert and oriented to person, place, and time. "   Psychiatric:         Attention and Perception: Attention normal.         Mood and Affect: Mood normal.         Speech: Speech normal.         Behavior: Behavior normal.         Judgment: Judgment normal.       Assessment & Plan   Diagnoses and all orders for this visit:    1. S/P laminectomy (Primary)    2. Chronic pain syndrome    3. Sacroiliac joint dysfunction of both sides    4. Peripheral polyneuropathy    5. Encounter for long-term (current) use of medications    6. History of osteomyelitis      --- Reviewed Lumbar MRI results with patient today.   --- The urine drug screen confirmation from 8/8/2022 has been reviewed and the result is appropriate based on patient history and POLLY report  --- CSA updated 8/8/2022  --- Refill [place] 5/325. DNF 12/1/2022 applied. Patient appears stable with current regimen. No adverse effects. Regarding continuation of opioids, there is no evidence of aberrant behavior or any red flags.  The patient continues with appropriate response to opioid therapy. ADL's remain intact by self.   --- Continue Lyrica  --- Continue Flexeril  --- Follow-up 2 months or sooner if needed.      POLLY REPORT  As part of the patient's treatment plan, I am prescribing controlled substances. The patient has been made aware of appropriate use of such medications, including potential risk of somnolence, limited ability to drive and/or work safely, and the potential for dependence or overdose. It has also been made clear that these medications are for use by this patient only, without concomitant use of alcohol or other substances unless prescribed.     Patient has completed prescribing agreement detailing terms of continued prescribing of controlled substances, including monitoring POLLY reports, urine drug screening, and pill counts if necessary. The patient is aware that inappropriate use will results in cessation of prescribing such medications.    As the clinician, I personally reviewed the  POLLY from 11/23/2022 while the patient was in the office today.    History and physical exam exhibit continued safe and appropriate use of controlled substances.    Dictated utilizing Dragon dictation.     Patient remained masked during entire encounter. No cough present. I donned a mask and eye protection throughout entire visit. Prior to donning mask and eye protection, hand hygiene was performed, as well as when it was doffed.  I was closer than 6 feet, but not for an extended period of time. No obvious exposure to any bodily fluids.

## 2022-11-28 ENCOUNTER — TELEPHONE (OUTPATIENT)
Dept: PAIN MEDICINE | Facility: CLINIC | Age: 52
End: 2022-11-28

## 2022-12-02 NOTE — TELEPHONE ENCOUNTER
Thank you, will you call their office and let them know the images are coming over and we would like Dr. Leblanc to review? Thanks

## 2022-12-27 DIAGNOSIS — Z87.39 HISTORY OF OSTEOMYELITIS: ICD-10-CM

## 2022-12-27 DIAGNOSIS — G89.4 CHRONIC PAIN SYNDROME: ICD-10-CM

## 2022-12-27 DIAGNOSIS — G62.9 PERIPHERAL POLYNEUROPATHY: ICD-10-CM

## 2022-12-27 DIAGNOSIS — M53.3 SACROILIAC JOINT DYSFUNCTION OF BOTH SIDES: ICD-10-CM

## 2022-12-27 DIAGNOSIS — Z98.890 S/P LAMINECTOMY: ICD-10-CM

## 2022-12-27 RX ORDER — PREGABALIN 200 MG/1
200 CAPSULE ORAL 3 TIMES DAILY
Qty: 90 CAPSULE | Refills: 1 | Status: SHIPPED | OUTPATIENT
Start: 2022-12-27 | End: 2023-02-23 | Stop reason: SDUPTHER

## 2022-12-27 RX ORDER — CYCLOBENZAPRINE HCL 10 MG
10 TABLET ORAL 3 TIMES DAILY PRN
Qty: 90 TABLET | Refills: 1 | Status: SHIPPED | OUTPATIENT
Start: 2022-12-27 | End: 2023-02-23 | Stop reason: SDUPTHER

## 2022-12-27 RX ORDER — HYDROCODONE BITARTRATE AND ACETAMINOPHEN 5; 325 MG/1; MG/1
TABLET ORAL
Qty: 135 TABLET | Refills: 0 | Status: SHIPPED | OUTPATIENT
Start: 2022-12-27 | End: 2023-01-23 | Stop reason: SDUPTHER

## 2023-01-09 ENCOUNTER — HOSPITAL ENCOUNTER (OUTPATIENT)
Dept: MAMMOGRAPHY | Facility: HOSPITAL | Age: 53
Discharge: HOME | End: 2023-01-09
Admitting: NURSE PRACTITIONER
Payer: COMMERCIAL

## 2023-01-09 DIAGNOSIS — Z12.31 VISIT FOR SCREENING MAMMOGRAM: ICD-10-CM

## 2023-01-09 PROCEDURE — 77063 BREAST TOMOSYNTHESIS BI: CPT

## 2023-01-09 PROCEDURE — 77067 SCR MAMMO BI INCL CAD: CPT

## 2023-01-23 ENCOUNTER — OFFICE VISIT (OUTPATIENT)
Dept: PAIN MEDICINE | Facility: CLINIC | Age: 53
End: 2023-01-23
Payer: COMMERCIAL

## 2023-01-23 VITALS
HEART RATE: 92 BPM | WEIGHT: 156.2 LBS | TEMPERATURE: 97.7 F | OXYGEN SATURATION: 98 % | SYSTOLIC BLOOD PRESSURE: 162 MMHG | HEIGHT: 68 IN | RESPIRATION RATE: 16 BRPM | DIASTOLIC BLOOD PRESSURE: 81 MMHG | BODY MASS INDEX: 23.67 KG/M2

## 2023-01-23 DIAGNOSIS — Z98.890 S/P LAMINECTOMY: ICD-10-CM

## 2023-01-23 DIAGNOSIS — Z87.39 HISTORY OF OSTEOMYELITIS: ICD-10-CM

## 2023-01-23 DIAGNOSIS — M53.3 SACROILIAC JOINT DYSFUNCTION OF BOTH SIDES: ICD-10-CM

## 2023-01-23 DIAGNOSIS — G62.9 PERIPHERAL POLYNEUROPATHY: ICD-10-CM

## 2023-01-23 DIAGNOSIS — G89.4 CHRONIC PAIN SYNDROME: ICD-10-CM

## 2023-01-23 PROCEDURE — 80305 DRUG TEST PRSMV DIR OPT OBS: CPT | Performed by: NURSE PRACTITIONER

## 2023-01-23 PROCEDURE — 99214 OFFICE O/P EST MOD 30 MIN: CPT | Performed by: NURSE PRACTITIONER

## 2023-01-23 RX ORDER — HYDROCODONE BITARTRATE AND ACETAMINOPHEN 5; 325 MG/1; MG/1
TABLET ORAL
Qty: 135 TABLET | Refills: 0 | Status: SHIPPED | OUTPATIENT
Start: 2023-01-23 | End: 2023-02-23 | Stop reason: SDUPTHER

## 2023-01-23 NOTE — PROGRESS NOTES
"CHIEF COMPLAINT    Follow up - back pain, pt states pain is the same since last ov    Subjective   Ana Maria Story is a 52 y.o. female  who presents for follow-up.  She has a history of back pain.  Today her pain is 3/10VAS in severity. She continues with Norco 5/325 4-5/day, Flexeril 10 mg 2-3/day, and Lyrica 200 mg 2-3/day (she does not take her medications if she is going to be driving). This medication regimen decreases her pain by \"quite a bit\".  ADLs by self. She denies any side effects including somnolence or constipation.      She now has custody of 3 grandchildren (ages 4, 16 months, and 7 weeks). This is keeping her quite active/busy.     History of epidural abscesses and iliopsoas abscess diagnosed on 11/2/2021.  Patient underwent laminectomy of T6, T12, and L2 with evacuation of epidural abscess on 11/4/2021. Patient had bilateral transgluteal drainage of pelvic abscess on 11/5/2021.     She continues maintenance dose of oral antibiotics (Keflex 500 mg once daily) and will remain on this until at least April 2023. She has seen ID since she completed imaging of her lumbar spine and pelvis in November 2022. She states that there was no current concern with her most recent imaging.      Previously did not tolerate baclofen (caused sleep walking)    Back Pain  This is a chronic problem. The current episode started more than 1 year ago. The problem occurs constantly. Progression since onset: improved since last office visit. The pain is present in the lumbar spine and thoracic spine. The pain is at a severity of 3/10. The pain is moderate. Associated symptoms include abdominal pain, headaches, numbness and weakness. Pertinent negatives include no bladder incontinence, bowel incontinence, chest pain, dysuria or fever. She has tried analgesics and muscle relaxant (SI injection, norco, flexeril) for the symptoms. The treatment provided moderate relief.   Peripheral Neuropathy  This is a chronic problem. The " current episode started more than 1 year ago. The problem occurs constantly. The problem has been waxing and waning. Associated symptoms include abdominal pain, arthralgias, congestion, headaches, joint swelling, myalgias, numbness, a sore throat and weakness. Pertinent negatives include no chest pain, chills, coughing, diaphoresis, fatigue, fever, nausea, neck pain or vomiting. Nothing (cold weather) aggravates the symptoms. She has tried NSAIDs and oral narcotics (Norco 5/325, Lyrica 200 mg TID, Flexeril at HS, Elavil 150 mg HS) for the symptoms. The treatment provided moderate relief.      PEG Assessment   What number best describes your pain on average in the past week?8  What number best describes how, during the past week, pain has interfered with your enjoyment of life?6  What number best describes how, during the past week, pain has interfered with your general activity?  6    The following portions of the patient's history were reviewed and updated as appropriate: allergies, current medications, past family history, past medical history, past social history, past surgical history and problem list.    Review of Systems   Constitutional: Negative for chills, diaphoresis, fatigue and fever.   HENT: Positive for congestion and sore throat.    Eyes: Negative for visual disturbance.   Respiratory: Negative for cough and shortness of breath.    Cardiovascular: Negative for chest pain.   Gastrointestinal: Positive for abdominal pain. Negative for bowel incontinence, constipation, diarrhea, nausea and vomiting.   Genitourinary: Negative for bladder incontinence, difficulty urinating, dyspareunia and dysuria.   Musculoskeletal: Positive for arthralgias, back pain, joint swelling and myalgias. Negative for neck pain.   Neurological: Positive for weakness, numbness and headaches.   Psychiatric/Behavioral: Negative for sleep disturbance and suicidal ideas.     --  The aforementioned information the Chief Complaint  "section and above subjective data including any HPI data, and also the Review of Systems data, has been personally reviewed and affirmed.  --    Vitals:    01/23/23 0841   BP: 162/81   Pulse: 92   Resp: 16   Temp: 97.7 °F (36.5 °C)   SpO2: 98%   Weight: 70.9 kg (156 lb 3.2 oz)   Height: 172.7 cm (67.99\")   PainSc:   3   PainLoc: Back     Objective   Physical Exam  Vitals and nursing note reviewed.   Constitutional:       Appearance: Normal appearance. She is well-developed.   Eyes:      General: Lids are normal.   Cardiovascular:      Rate and Rhythm: Normal rate.   Pulmonary:      Effort: Pulmonary effort is normal.   Musculoskeletal:      Lumbar back: Tenderness and bony tenderness present. Decreased range of motion.   Neurological:      Mental Status: She is alert and oriented to person, place, and time.   Psychiatric:         Attention and Perception: Attention normal.         Mood and Affect: Mood normal.         Speech: Speech normal.         Behavior: Behavior normal.         Judgment: Judgment normal.       Assessment & Plan   Diagnoses and all orders for this visit:    1. Chronic pain syndrome  -     HYDROcodone-acetaminophen (NORCO) 5-325 MG per tablet; Take one po every 4-6 hours prn severe pain. DNF 1/29/2023  Dispense: 135 tablet; Refill: 0    2. S/P laminectomy  -     HYDROcodone-acetaminophen (NORCO) 5-325 MG per tablet; Take one po every 4-6 hours prn severe pain. DNF 1/29/2023  Dispense: 135 tablet; Refill: 0    3. Sacroiliac joint dysfunction of both sides  -     HYDROcodone-acetaminophen (NORCO) 5-325 MG per tablet; Take one po every 4-6 hours prn severe pain. DNF 1/29/2023  Dispense: 135 tablet; Refill: 0    4. Peripheral polyneuropathy  -     HYDROcodone-acetaminophen (NORCO) 5-325 MG per tablet; Take one po every 4-6 hours prn severe pain. DNF 1/29/2023  Dispense: 135 tablet; Refill: 0    5. History of osteomyelitis  -     HYDROcodone-acetaminophen (NORCO) 5-325 MG per tablet; Take one po every " 4-6 hours prn severe pain. DNF 1/29/2023  Dispense: 135 tablet; Refill: 0      --- The urine drug screen confirmation from 8/8/2022 has been reviewed and the result is appropriate based on patient history and POLLY report  --- CSA updated 8/8/2022  --- Refill [place] 5/325. DNF 1/29/2023 applied. Patient appears stable with current regimen. No adverse effects. Regarding continuation of opioids, there is no evidence of aberrant behavior or any red flags.  The patient continues with appropriate response to opioid therapy. ADL's remain intact by self.   --- Follow-up 2 months or sooner if needed.      POLLY REPORT  As part of the patient's treatment plan, I am prescribing controlled substances. The patient has been made aware of appropriate use of such medications, including potential risk of somnolence, limited ability to drive and/or work safely, and the potential for dependence or overdose. It has also been made clear that these medications are for use by this patient only, without concomitant use of alcohol or other substances unless prescribed.     Patient has completed prescribing agreement detailing terms of continued prescribing of controlled substances, including monitoring POLLY reports, urine drug screening, and pill counts if necessary. The patient is aware that inappropriate use will results in cessation of prescribing such medications.    As the clinician, I personally reviewed the POLLY from 1/23/2023 while the patient was in the office today.    History and physical exam exhibit continued safe and appropriate use of controlled substances.    Dictated utilizing Dragon dictation.     Patient remained masked during entire encounter. No cough present. I donned a mask and eye protection throughout entire visit. Prior to donning mask and eye protection, hand hygiene was performed, as well as when it was doffed.  I was closer than 6 feet, but not for an extended period of time. No obvious exposure to any  bodily fluids.

## 2023-02-23 DIAGNOSIS — Z87.39 HISTORY OF OSTEOMYELITIS: ICD-10-CM

## 2023-02-23 DIAGNOSIS — M53.3 SACROILIAC JOINT DYSFUNCTION OF BOTH SIDES: ICD-10-CM

## 2023-02-23 DIAGNOSIS — G62.9 PERIPHERAL POLYNEUROPATHY: ICD-10-CM

## 2023-02-23 DIAGNOSIS — G89.4 CHRONIC PAIN SYNDROME: ICD-10-CM

## 2023-02-23 DIAGNOSIS — Z98.890 S/P LAMINECTOMY: ICD-10-CM

## 2023-02-23 RX ORDER — HYDROCODONE BITARTRATE AND ACETAMINOPHEN 5; 325 MG/1; MG/1
TABLET ORAL
Qty: 135 TABLET | Refills: 0 | Status: SHIPPED | OUTPATIENT
Start: 2023-02-23 | End: 2023-03-20 | Stop reason: SDUPTHER

## 2023-02-23 RX ORDER — CYCLOBENZAPRINE HCL 10 MG
10 TABLET ORAL 3 TIMES DAILY PRN
Qty: 90 TABLET | Refills: 1 | Status: SHIPPED | OUTPATIENT
Start: 2023-02-23

## 2023-02-23 RX ORDER — PREGABALIN 200 MG/1
200 CAPSULE ORAL 3 TIMES DAILY
Qty: 90 CAPSULE | Refills: 1 | Status: SHIPPED | OUTPATIENT
Start: 2023-02-23

## 2023-03-20 ENCOUNTER — OFFICE VISIT (OUTPATIENT)
Dept: PAIN MEDICINE | Facility: CLINIC | Age: 53
End: 2023-03-20
Payer: COMMERCIAL

## 2023-03-20 VITALS
HEIGHT: 68 IN | WEIGHT: 160.8 LBS | OXYGEN SATURATION: 100 % | HEART RATE: 97 BPM | DIASTOLIC BLOOD PRESSURE: 73 MMHG | SYSTOLIC BLOOD PRESSURE: 149 MMHG | BODY MASS INDEX: 24.37 KG/M2 | RESPIRATION RATE: 20 BRPM | TEMPERATURE: 97.8 F

## 2023-03-20 DIAGNOSIS — M53.3 SACROILIAC JOINT DYSFUNCTION OF BOTH SIDES: ICD-10-CM

## 2023-03-20 DIAGNOSIS — Z98.890 S/P LAMINECTOMY: ICD-10-CM

## 2023-03-20 DIAGNOSIS — G62.9 PERIPHERAL POLYNEUROPATHY: ICD-10-CM

## 2023-03-20 DIAGNOSIS — Z87.39 HISTORY OF OSTEOMYELITIS: ICD-10-CM

## 2023-03-20 DIAGNOSIS — G89.4 CHRONIC PAIN SYNDROME: ICD-10-CM

## 2023-03-20 DIAGNOSIS — Z79.899 ENCOUNTER FOR LONG-TERM (CURRENT) USE OF MEDICATIONS: Primary | ICD-10-CM

## 2023-03-20 PROCEDURE — 1159F MED LIST DOCD IN RCRD: CPT | Performed by: NURSE PRACTITIONER

## 2023-03-20 PROCEDURE — 1125F AMNT PAIN NOTED PAIN PRSNT: CPT | Performed by: NURSE PRACTITIONER

## 2023-03-20 PROCEDURE — 80305 DRUG TEST PRSMV DIR OPT OBS: CPT | Performed by: NURSE PRACTITIONER

## 2023-03-20 PROCEDURE — 99214 OFFICE O/P EST MOD 30 MIN: CPT | Performed by: NURSE PRACTITIONER

## 2023-03-20 PROCEDURE — 1160F RVW MEDS BY RX/DR IN RCRD: CPT | Performed by: NURSE PRACTITIONER

## 2023-03-20 RX ORDER — HYDROCODONE BITARTRATE AND ACETAMINOPHEN 5; 325 MG/1; MG/1
TABLET ORAL
Qty: 135 TABLET | Refills: 0 | Status: SHIPPED | OUTPATIENT
Start: 2023-03-20

## 2023-03-20 NOTE — PROGRESS NOTES
CHIEF COMPLAINT  Back pain   Patient reports fluctuating pain since last OV.     Subjective   Ana Maria Story is a 52 y.o. female  who presents for follow-up.  She has a history of back pain and extremity pain d/t peripheral neuropathy.  Today her pain is 7/10VAS in severity. She states that her pain is worse from caring for her grandchildren. She continues with Norco 5/325 4-5/day, Flexeril 10 mg 2-3/day, and Lyrica 200 mg 3/day (she does not take her medications if she is going to be driving). This medication regimen decreases her pain by a significant amount and allows her to care for her grandchildren. ADLs by self. She denies any side effects including somnolence or constipation.     She has custody of 3 grandchildren (all under the age of 5). This is keeping her quite active/busy.      History of epidural abscesses and iliopsoas abscess diagnosed on 11/2/2021.  Patient underwent laminectomy of T6, T12, and L2 with evacuation of epidural abscess on 11/4/2021. Patient had bilateral transgluteal drainage of pelvic abscess on 11/5/2021.     She continues maintenance dose of oral antibiotics (Keflex 500 mg once daily) and will remain on this until at least April 2023. She has seen ID since she completed imaging of her lumbar spine and pelvis in November 2022. She states that there was no current concern with her most recent imaging.      Previously did not tolerate baclofen (caused sleep walking)    Back Pain  This is a chronic problem. The current episode started more than 1 year ago. The problem occurs constantly. The pain is present in the lumbar spine and thoracic spine. The pain is at a severity of 7/10. The pain is moderate. Associated symptoms include headaches, numbness (hands,feet,back) and weakness. Pertinent negatives include no abdominal pain, bladder incontinence, bowel incontinence, chest pain, dysuria or fever. She has tried analgesics and muscle relaxant (SI injection, norco, flexeril) for the  symptoms. The treatment provided moderate relief.   Peripheral Neuropathy  This is a chronic problem. The current episode started more than 1 year ago. The problem occurs constantly. The problem has been waxing and waning. Associated symptoms include arthralgias, headaches, joint swelling, myalgias, numbness (hands,feet,back), a sore throat and weakness. Pertinent negatives include no abdominal pain, chest pain, chills, congestion, coughing, diaphoresis, fatigue, fever, nausea, neck pain or vomiting. Nothing (cold weather) aggravates the symptoms. She has tried NSAIDs and oral narcotics (Norco 5/325, Lyrica 200 mg TID, Flexeril at HS, Elavil 150 mg HS) for the symptoms. The treatment provided moderate relief.      PEG Assessment   What number best describes your pain on average in the past week?7  What number best describes how, during the past week, pain has interfered with your enjoyment of life?5  What number best describes how, during the past week, pain has interfered with your general activity?  5    The following portions of the patient's history were reviewed and updated as appropriate: allergies, current medications, past family history, past medical history, past social history, past surgical history and problem list.    Review of Systems   Constitutional: Positive for activity change. Negative for chills, diaphoresis, fatigue and fever.   HENT: Positive for sore throat. Negative for congestion.    Eyes: Negative for visual disturbance.   Respiratory: Negative for cough and chest tightness.    Cardiovascular: Negative for chest pain.   Gastrointestinal: Positive for constipation and diarrhea. Negative for abdominal pain, bowel incontinence, nausea and vomiting.   Genitourinary: Negative for bladder incontinence, difficulty urinating and dysuria.   Musculoskeletal: Positive for arthralgias, back pain, joint swelling and myalgias. Negative for neck pain.   Neurological: Positive for weakness, numbness  "(hands,feet,back) and headaches. Negative for dizziness and light-headedness.   Psychiatric/Behavioral: Positive for sleep disturbance. Negative for agitation and suicidal ideas. The patient is not nervous/anxious.      --  The aforementioned information the Chief Complaint section and above subjective data including any HPI data, and also the Review of Systems data, has been personally reviewed and affirmed.  --    Vitals:    03/20/23 0854   BP: 149/73   BP Location: Right arm   Patient Position: Sitting   Cuff Size: Adult   Pulse: 97   Resp: 20   Temp: 97.8 °F (36.6 °C)   TempSrc: Temporal   SpO2: 100%   Weight: 72.9 kg (160 lb 12.8 oz)   Height: 172.7 cm (67.99\")   PainSc:   7     Objective   Physical Exam  Vitals and nursing note reviewed.   Constitutional:       Appearance: Normal appearance. She is well-developed.   Eyes:      General: Lids are normal.   Cardiovascular:      Rate and Rhythm: Normal rate.   Pulmonary:      Effort: Pulmonary effort is normal.   Musculoskeletal:      Lumbar back: Tenderness and bony tenderness present. Decreased range of motion.   Neurological:      Mental Status: She is alert and oriented to person, place, and time.   Psychiatric:         Attention and Perception: Attention normal.         Mood and Affect: Mood normal.         Speech: Speech normal.         Behavior: Behavior normal.         Judgment: Judgment normal.       Assessment & Plan   Diagnoses and all orders for this visit:    1. Encounter for long-term (current) use of medications (Primary)    2. Chronic pain syndrome  -     HYDROcodone-acetaminophen (NORCO) 5-325 MG per tablet; Take one po every 4-6 hours prn severe pain. DNF 3/30/2023  Dispense: 135 tablet; Refill: 0    3. S/P laminectomy  -     HYDROcodone-acetaminophen (NORCO) 5-325 MG per tablet; Take one po every 4-6 hours prn severe pain. DNF 3/30/2023  Dispense: 135 tablet; Refill: 0    4. Sacroiliac joint dysfunction of both sides  -     " HYDROcodone-acetaminophen (NORCO) 5-325 MG per tablet; Take one po every 4-6 hours prn severe pain. DNF 3/30/2023  Dispense: 135 tablet; Refill: 0    5. Peripheral polyneuropathy  -     HYDROcodone-acetaminophen (NORCO) 5-325 MG per tablet; Take one po every 4-6 hours prn severe pain. DNF 3/30/2023  Dispense: 135 tablet; Refill: 0    6. History of osteomyelitis  -     HYDROcodone-acetaminophen (NORCO) 5-325 MG per tablet; Take one po every 4-6 hours prn severe pain. DNF 3/30/2023  Dispense: 135 tablet; Refill: 0      --- Routine UDS in office today as part of monitoring requirements for controlled substances.  The specimen was viewed and the immunoassay result reviewed and is NEG (last dose was last night).  This specimen will be sent to [facility] for confirmation.     --- CSA updated 8/8/2022  --- Refill [employer]. DNF 3/30/2023 applied. Patient appears stable with current regimen. No adverse effects. Regarding continuation of opioids, there is no evidence of aberrant behavior or any red flags.  The patient continues with appropriate response to opioid therapy. ADL's remain intact by self.   --- Continue Flexeril and Pregabalin, no refills needed.   --- Follow-up 2 months or sooner if needed.      POLLY REPORT  As part of the patient's treatment plan, I am prescribing controlled substances. The patient has been made aware of appropriate use of such medications, including potential risk of somnolence, limited ability to drive and/or work safely, and the potential for dependence or overdose. It has also been made clear that these medications are for use by this patient only, without concomitant use of alcohol or other substances unless prescribed.     Patient has completed prescribing agreement detailing terms of continued prescribing of controlled substances, including monitoring POLLY reports, urine drug screening, and pill counts if necessary. The patient is aware that inappropriate use will results in  cessation of prescribing such medications.    As the clinician, I personally reviewed the POLLY from 3/20/2023 while the patient was in the office today.    History and physical exam exhibit continued safe and appropriate use of controlled substances.    Dictated utilizing Dragon dictation.     Patient remained masked during entire encounter. No cough present. I donned a mask and eye protection throughout entire visit. Prior to donning mask and eye protection, hand hygiene was performed, as well as when it was doffed.  I was closer than 6 feet, but not for an extended period of time. No obvious exposure to any bodily fluids.

## 2023-04-25 DIAGNOSIS — G89.4 CHRONIC PAIN SYNDROME: ICD-10-CM

## 2023-04-25 DIAGNOSIS — Z87.39 HISTORY OF OSTEOMYELITIS: ICD-10-CM

## 2023-04-25 DIAGNOSIS — Z98.890 S/P LAMINECTOMY: ICD-10-CM

## 2023-04-25 DIAGNOSIS — G62.9 PERIPHERAL POLYNEUROPATHY: ICD-10-CM

## 2023-04-25 DIAGNOSIS — M53.3 SACROILIAC JOINT DYSFUNCTION OF BOTH SIDES: ICD-10-CM

## 2023-04-25 RX ORDER — CYCLOBENZAPRINE HCL 10 MG
10 TABLET ORAL 3 TIMES DAILY PRN
Qty: 90 TABLET | Refills: 1 | Status: SHIPPED | OUTPATIENT
Start: 2023-04-25

## 2023-04-25 RX ORDER — HYDROCODONE BITARTRATE AND ACETAMINOPHEN 5; 325 MG/1; MG/1
TABLET ORAL
Qty: 135 TABLET | Refills: 0 | Status: SHIPPED | OUTPATIENT
Start: 2023-04-25

## 2023-04-25 RX ORDER — PREGABALIN 200 MG/1
200 CAPSULE ORAL 3 TIMES DAILY
Qty: 90 CAPSULE | Refills: 1 | Status: SHIPPED | OUTPATIENT
Start: 2023-04-25

## 2023-05-01 ENCOUNTER — PRIOR AUTHORIZATION (OUTPATIENT)
Dept: PAIN MEDICINE | Facility: CLINIC | Age: 53
End: 2023-05-01
Payer: COMMERCIAL

## 2023-05-01 NOTE — TELEPHONE ENCOUNTER
Received approval letter for norco 5-325 mg good through 10-31-23.              Patient was notified.

## 2023-05-22 ENCOUNTER — OFFICE VISIT (OUTPATIENT)
Dept: PAIN MEDICINE | Facility: CLINIC | Age: 53
End: 2023-05-22
Payer: COMMERCIAL

## 2023-05-22 VITALS
WEIGHT: 148.8 LBS | TEMPERATURE: 97.7 F | SYSTOLIC BLOOD PRESSURE: 133 MMHG | HEIGHT: 68 IN | DIASTOLIC BLOOD PRESSURE: 77 MMHG | HEART RATE: 88 BPM | BODY MASS INDEX: 22.55 KG/M2

## 2023-05-22 DIAGNOSIS — M53.3 SACROILIAC JOINT DYSFUNCTION OF BOTH SIDES: ICD-10-CM

## 2023-05-22 DIAGNOSIS — Z87.39 HISTORY OF OSTEOMYELITIS: ICD-10-CM

## 2023-05-22 DIAGNOSIS — Z98.890 S/P LAMINECTOMY: ICD-10-CM

## 2023-05-22 DIAGNOSIS — G89.4 CHRONIC PAIN SYNDROME: ICD-10-CM

## 2023-05-22 DIAGNOSIS — G62.9 PERIPHERAL POLYNEUROPATHY: ICD-10-CM

## 2023-05-22 DIAGNOSIS — Z79.899 ENCOUNTER FOR LONG-TERM (CURRENT) USE OF MEDICATIONS: Primary | ICD-10-CM

## 2023-05-22 RX ORDER — HYDROCODONE BITARTRATE AND ACETAMINOPHEN 5; 325 MG/1; MG/1
TABLET ORAL
Qty: 135 TABLET | Refills: 0 | Status: SHIPPED | OUTPATIENT
Start: 2023-05-22

## 2023-05-22 NOTE — PROGRESS NOTES
CHIEF COMPLAINT    Follow up generalized pain - back, legs, arms, hands and feet. The pain is unchanged since last visit.    Subjective   Ana Maria Story is a 52 y.o. female  who presents for follow-up.  She has a history of back pain and extremity pain d/t peripheral neuropathy. Today her pain is 4/10VAS in severity. She continues with Norco 5/325 4-5/day, Flexeril 10 mg 2-3/day, and Lyrica 200 mg 3/day (she does not take her medications if she is going to be driving). She states that she has been more consistent with her Lyrica and has seen improvement in her neuropathic pain. She does state that she has been taking nightly walks. This medication regimen decreases her pain by a significant amount. This regimen allows her to care for her grandchildren, clean her house, and meet her functional ADLs. She denies any side effects including somnolence or constipation.     She has custody of 3 grandchildren (all under the age of 5).      History of epidural abscesses and iliopsoas abscess diagnosed on 11/2/2021.  Patient underwent laminectomy of T6, T12, and L2 with evacuation of epidural abscess on 11/4/2021. Patient had bilateral transgluteal drainage of pelvic abscess on 11/5/2021.     She continues maintenance dose of oral antibiotics (Keflex 500 mg once daily). She is on her last prescription of this. She is on her last refill of this. I did recommend she call her ID physician to regarding this/      Previously did not tolerate baclofen (caused sleep walking)    Back Pain  This is a chronic problem. The current episode started more than 1 year ago. The problem occurs constantly. The problem is unchanged. The pain is present in the lumbar spine and thoracic spine. The pain is at a severity of 4/10. The pain is moderate. Associated symptoms include headaches and numbness (bilateral feet, legs, arms, hands and back). Pertinent negatives include no abdominal pain, bladder incontinence, bowel incontinence, chest pain,  dysuria or fever. Weakness: legs/hands. She has tried analgesics and muscle relaxant (SI injection, norco, flexeril) for the symptoms. The treatment provided moderate relief.   Peripheral Neuropathy  This is a chronic problem. The current episode started more than 1 year ago. The problem occurs constantly. The problem has been unchanged. Associated symptoms include arthralgias, fatigue, headaches, joint swelling, myalgias, numbness (bilateral feet, legs, arms, hands and back) and a sore throat. Pertinent negatives include no abdominal pain, chest pain, chills, congestion, coughing, diaphoresis, fever, nausea, neck pain or vomiting. Weakness: legs/hands. Nothing (cold weather) aggravates the symptoms. She has tried NSAIDs and oral narcotics (Norco 5/325, Lyrica 200 mg TID, Flexeril at HS, Elavil 150 mg HS) for the symptoms. The treatment provided moderate relief.      PEG Assessment   What number best describes your pain on average in the past week?5  What number best describes how, during the past week, pain has interfered with your enjoyment of life?2  What number best describes how, during the past week, pain has interfered with your general activity?  4    The following portions of the patient's history were reviewed and updated as appropriate: allergies, current medications, past family history, past medical history, past social history, past surgical history and problem list.    Review of Systems   Constitutional: Positive for fatigue. Negative for chills, diaphoresis and fever.   HENT: Positive for sore throat. Negative for congestion.    Respiratory: Negative for cough and shortness of breath.    Cardiovascular: Negative for chest pain.   Gastrointestinal: Positive for diarrhea (one episode - IBS). Negative for abdominal pain, bowel incontinence, constipation, nausea and vomiting.   Genitourinary: Negative for bladder incontinence, difficulty urinating and dysuria.   Musculoskeletal: Positive for arthralgias,  "back pain, joint swelling and myalgias. Negative for neck pain.   Neurological: Positive for numbness (bilateral feet, legs, arms, hands and back) and headaches. Negative for dizziness and light-headedness. Weakness: legs/hands.   Psychiatric/Behavioral: Positive for sleep disturbance. Negative for suicidal ideas.     --  The aforementioned information the Chief Complaint section and above subjective data including any HPI data, and also the Review of Systems data, has been personally reviewed and affirmed.  --    Vitals:    05/22/23 0844   BP: 133/77   BP Location: Left arm   Patient Position: Sitting   Pulse: 88   Temp: 97.7 °F (36.5 °C)   TempSrc: Temporal   Weight: 67.5 kg (148 lb 12.8 oz)   Height: 172.7 cm (67.99\")   PainSc:   4   PainLoc: Back     Objective   Physical Exam  Vitals and nursing note reviewed.   Constitutional:       Appearance: Normal appearance. She is well-developed.   Eyes:      General: Lids are normal.   Cardiovascular:      Rate and Rhythm: Normal rate.   Pulmonary:      Effort: Pulmonary effort is normal.   Musculoskeletal:      Lumbar back: Tenderness and bony tenderness present. Decreased range of motion.   Neurological:      Mental Status: She is alert and oriented to person, place, and time.      Gait: Gait normal.   Psychiatric:         Attention and Perception: Attention normal.         Mood and Affect: Mood normal.         Speech: Speech normal.         Behavior: Behavior normal.         Judgment: Judgment normal.       Assessment & Plan   Diagnoses and all orders for this visit:    1. Encounter for long-term (current) use of medications (Primary)    2. Chronic pain syndrome  -     HYDROcodone-acetaminophen (NORCO) 5-325 MG per tablet; Take one po every 4-6 hours prn severe pain. DNF 5/29/2023  Dispense: 135 tablet; Refill: 0    3. S/P laminectomy  -     HYDROcodone-acetaminophen (NORCO) 5-325 MG per tablet; Take one po every 4-6 hours prn severe pain. DNF 5/29/2023  Dispense: 135 " tablet; Refill: 0    4. Sacroiliac joint dysfunction of both sides  -     HYDROcodone-acetaminophen (NORCO) 5-325 MG per tablet; Take one po every 4-6 hours prn severe pain. DNF 5/29/2023  Dispense: 135 tablet; Refill: 0    5. Peripheral polyneuropathy  -     HYDROcodone-acetaminophen (NORCO) 5-325 MG per tablet; Take one po every 4-6 hours prn severe pain. DNF 5/29/2023  Dispense: 135 tablet; Refill: 0    6. History of osteomyelitis  -     HYDROcodone-acetaminophen (NORCO) 5-325 MG per tablet; Take one po every 4-6 hours prn severe pain. DNF 5/29/2023  Dispense: 135 tablet; Refill: 0      --- The urine drug screen confirmation from 3/20/2023 has been reviewed and the result is appropriate based on patient history and POLLY report  --- CSA updated 8/8/2022  --- Refill [place] 5/325. Fill date 5/29/2023 applied. Patient appears stable with current regimen. No adverse effects. Regarding continuation of opioids, there is no evidence of aberrant behavior or any red flags.  The patient continues with appropriate response to opioid therapy. ADL's remain intact by self.   --- Continue Lyrica and flexeril. No refills needed.   --- Follow-up 2 months or sooner if needed.      POLLY REPORT  As part of the patient's treatment plan, I am prescribing controlled substances. The patient has been made aware of appropriate use of such medications, including potential risk of somnolence, limited ability to drive and/or work safely, and the potential for dependence or overdose. It has also been made clear that these medications are for use by this patient only, without concomitant use of alcohol or other substances unless prescribed.     Patient has completed prescribing agreement detailing terms of continued prescribing of controlled substances, including monitoring POLLY reports, urine drug screening, and pill counts if necessary. The patient is aware that inappropriate use will results in cessation of prescribing such  medications.    As the clinician, I personally reviewed the POLLY from 5/22/2023 while the patient was in the office today.    History and physical exam exhibit continued safe and appropriate use of controlled substances.    Dictated utilizing Dragon dictation.

## 2023-07-24 ENCOUNTER — OFFICE VISIT (OUTPATIENT)
Dept: PAIN MEDICINE | Facility: CLINIC | Age: 53
End: 2023-07-24
Payer: COMMERCIAL

## 2023-07-24 VITALS
BODY MASS INDEX: 22.04 KG/M2 | HEART RATE: 81 BPM | WEIGHT: 145.4 LBS | RESPIRATION RATE: 18 BRPM | TEMPERATURE: 97.8 F | OXYGEN SATURATION: 99 % | DIASTOLIC BLOOD PRESSURE: 90 MMHG | SYSTOLIC BLOOD PRESSURE: 138 MMHG | HEIGHT: 68 IN

## 2023-07-24 DIAGNOSIS — Z87.39 HISTORY OF OSTEOMYELITIS: ICD-10-CM

## 2023-07-24 DIAGNOSIS — G62.9 PERIPHERAL POLYNEUROPATHY: ICD-10-CM

## 2023-07-24 DIAGNOSIS — M53.3 SACROILIAC JOINT DYSFUNCTION OF BOTH SIDES: ICD-10-CM

## 2023-07-24 DIAGNOSIS — Z98.890 S/P LAMINECTOMY: ICD-10-CM

## 2023-07-24 DIAGNOSIS — Z79.899 ENCOUNTER FOR LONG-TERM (CURRENT) USE OF MEDICATIONS: Primary | ICD-10-CM

## 2023-07-24 DIAGNOSIS — G89.4 CHRONIC PAIN SYNDROME: ICD-10-CM

## 2023-07-24 DIAGNOSIS — M47.816 SPONDYLOSIS OF LUMBAR REGION WITHOUT MYELOPATHY OR RADICULOPATHY: ICD-10-CM

## 2023-07-24 PROCEDURE — 1159F MED LIST DOCD IN RCRD: CPT | Performed by: NURSE PRACTITIONER

## 2023-07-24 PROCEDURE — 99214 OFFICE O/P EST MOD 30 MIN: CPT | Performed by: NURSE PRACTITIONER

## 2023-07-24 PROCEDURE — 1160F RVW MEDS BY RX/DR IN RCRD: CPT | Performed by: NURSE PRACTITIONER

## 2023-07-24 PROCEDURE — 1125F AMNT PAIN NOTED PAIN PRSNT: CPT | Performed by: NURSE PRACTITIONER

## 2023-07-24 RX ORDER — CEPHALEXIN 500 MG/1
500 CAPSULE ORAL DAILY
COMMUNITY
Start: 2023-07-21

## 2023-07-24 RX ORDER — HYDROCODONE BITARTRATE AND ACETAMINOPHEN 5; 325 MG/1; MG/1
TABLET ORAL
Qty: 135 TABLET | Refills: 0 | Status: SHIPPED | OUTPATIENT
Start: 2023-07-24

## 2023-07-24 NOTE — PROGRESS NOTES
CHIEF COMPLAINT  Follow-up for back pain.    Subjective   Ana Maria Story is a 52 y.o. female  who presents for follow-up.  She has a history of back pain.  Today her pain is 4/10VAS in severity. She states that her pain has been a little elevated this past week d/t traveling. She states that they were in [place]. She has treated this with rest which has been somewhat helpful. She states that ice and heat have both made the pain worse. Icyhot is somewhat helpful.     She continues with Norco 5/325 3-4/day, Flexeril 10 mg 2-3/day, and Lyrica 200 mg 3/day (she does not take her medications if she is going to be driving). This medication regimen decreases her pain by approximately 40%. This regimen does allow her to continue to care for her three grandchildren. ADLs by self. She denies any changes to bowel or bladder. She does have intermittent constipation, she states that this is well controlled at this time. She denies any side effects including somnolence.     She has custody of 3 grandchildren (all under the age of 5).      History of epidural abscesses and iliopsoas abscess diagnosed on 11/2/2021.  Patient underwent laminectomy of T6, T12, and L2 with evacuation of epidural abscess on 11/4/2021. Patient had bilateral transgluteal drainage of pelvic abscess on 11/5/2021.     She continues maintenance dose of oral antibiotics (Keflex 500 mg once daily). She is followed by ID yearly.      Previously did not tolerate baclofen (caused sleep walking)    Back Pain  This is a chronic problem. The current episode started more than 1 year ago. The problem occurs constantly. The problem has been waxing and waning since onset. The pain is present in the lumbar spine and thoracic spine. The pain is at a severity of 4/10. The pain is moderate. Associated symptoms include headaches, numbness and weakness. Pertinent negatives include no abdominal pain, bladder incontinence, bowel incontinence, chest pain, dysuria or fever.  She has tried analgesics and muscle relaxant (SI injection, norco, flexeril) for the symptoms. The treatment provided moderate relief.   Peripheral Neuropathy  This is a chronic problem. The current episode started more than 1 year ago. The problem occurs constantly. The problem has been waxing and waning. Associated symptoms include arthralgias, fatigue, headaches, joint swelling, myalgias, numbness, a sore throat and weakness. Pertinent negatives include no abdominal pain, chest pain, chills, congestion, coughing, diaphoresis, fever, nausea, neck pain or vomiting. Nothing (cold weather) aggravates the symptoms. She has tried NSAIDs and oral narcotics (Norco 5/325, Lyrica 200 mg TID, Flexeril at HS, Elavil 150 mg HS) for the symptoms. The treatment provided moderate relief.      PEG Assessment   What number best describes your pain on average in the past week?6  What number best describes how, during the past week, pain has interfered with your enjoyment of life?5  What number best describes how, during the past week, pain has interfered with your general activity?  5    The following portions of the patient's history were reviewed and updated as appropriate: allergies, current medications, past family history, past medical history, past social history, past surgical history, and problem list.    Review of Systems   Constitutional:  Positive for fatigue. Negative for chills, diaphoresis and fever.   HENT:  Positive for sore throat. Negative for congestion.    Respiratory:  Negative for cough.    Cardiovascular:  Negative for chest pain.   Gastrointestinal:  Negative for abdominal pain, bowel incontinence, constipation, diarrhea, nausea and vomiting.   Genitourinary:  Negative for bladder incontinence, difficulty urinating and dysuria.   Musculoskeletal:  Positive for arthralgias, back pain, joint swelling and myalgias. Negative for neck pain.   Neurological:  Positive for weakness, numbness and headaches.  "  Psychiatric/Behavioral:  Positive for sleep disturbance. Negative for suicidal ideas. The patient is not nervous/anxious.      --  The aforementioned information the Chief Complaint section and above subjective data including any HPI data, and also the Review of Systems data, has been personally reviewed and affirmed.  --    Vitals:    07/24/23 0919   BP: 138/90   Pulse: 81   Resp: 18   Temp: 97.8 °F (36.6 °C)   SpO2: 99%   Weight: 66 kg (145 lb 6.4 oz)   Height: 172.7 cm (67.99\")   PainSc:   4   PainLoc: Back     Objective   Physical Exam  Vitals and nursing note reviewed.   Constitutional:       Appearance: Normal appearance. She is well-developed.   Eyes:      General: Lids are normal.   Cardiovascular:      Rate and Rhythm: Normal rate.   Pulmonary:      Effort: Pulmonary effort is normal.   Musculoskeletal:      Thoracic back: Tenderness present. Decreased range of motion.      Lumbar back: Tenderness present. Decreased range of motion.   Neurological:      Mental Status: She is alert and oriented to person, place, and time.   Psychiatric:         Attention and Perception: Attention normal.         Mood and Affect: Mood normal.         Speech: Speech normal.         Behavior: Behavior normal.         Judgment: Judgment normal.       Assessment & Plan   Diagnoses and all orders for this visit:    1. Encounter for long-term (current) use of medications (Primary)    2. Chronic pain syndrome    3. S/P laminectomy    4. History of osteomyelitis    5. Peripheral polyneuropathy    6. Sacroiliac joint dysfunction of both sides    7. Spondylosis of lumbar region without myelopathy or radiculopathy      Ana Maria M Trelleufemia reports a pain score of 4.  Given her pain assessment as noted, treatment options were discussed and the following options were decided upon as a follow-up plan to address the patient's pain: continuation of current treatment plan for pain.    --- The urine drug screen confirmation from 3/20/2023 has " been reviewed and the result is appropriate based on patient history and POLLY report  --- CSA updated 8/8/2022  --- Refill [place] 5/325. Fill date 7/29/2023 applied. Patient appears stable with current regimen. No adverse effects. Regarding continuation of opioids, there is no evidence of aberrant behavior or any red flags.  The patient continues with appropriate response to opioid therapy. ADL's remain intact by self.   --- Continue Lyrica and Flexeril, no refill needed.   --- She asks if a back brace could be helpful while she lifts the children through the day, this is reasonable to trial. I will order a custom fitted back brace to reduce pain by restricting mobility of the spine. A prescription will be sent to orthopedic specialists. Reviewed that she should use the back brace for a maximum of 2 hours total a day.   --- Follow-up 2 months or sooner if needed.      POLLY REPORT  As part of the patient's treatment plan, I am prescribing controlled substances. The patient has been made aware of appropriate use of such medications, including potential risk of somnolence, limited ability to drive and/or work safely, and the potential for dependence or overdose. It has also been made clear that these medications are for use by this patient only, without concomitant use of alcohol or other substances unless prescribed.     Patient has completed prescribing agreement detailing terms of continued prescribing of controlled substances, including monitoring POLLY reports, urine drug screening, and pill counts if necessary. The patient is aware that inappropriate use will results in cessation of prescribing such medications.    As the clinician, I personally reviewed the POLLY from 7/24/2023 while the patient was in the office today.    History and physical exam exhibit continued safe and appropriate use of controlled substances.    Dictated utilizing Dragon dictation.

## 2023-08-25 DIAGNOSIS — Z87.39 HISTORY OF OSTEOMYELITIS: ICD-10-CM

## 2023-08-25 DIAGNOSIS — G62.9 PERIPHERAL POLYNEUROPATHY: ICD-10-CM

## 2023-08-25 DIAGNOSIS — M53.3 SACROILIAC JOINT DYSFUNCTION OF BOTH SIDES: ICD-10-CM

## 2023-08-25 DIAGNOSIS — G89.4 CHRONIC PAIN SYNDROME: ICD-10-CM

## 2023-08-25 DIAGNOSIS — Z98.890 S/P LAMINECTOMY: ICD-10-CM

## 2023-08-25 RX ORDER — HYDROCODONE BITARTRATE AND ACETAMINOPHEN 5; 325 MG/1; MG/1
TABLET ORAL
Qty: 135 TABLET | Refills: 0 | Status: SHIPPED | OUTPATIENT
Start: 2023-08-25

## 2023-08-25 RX ORDER — PREGABALIN 200 MG/1
200 CAPSULE ORAL 3 TIMES DAILY
Qty: 90 CAPSULE | Refills: 1 | Status: SHIPPED | OUTPATIENT
Start: 2023-08-25

## 2023-08-25 RX ORDER — CYCLOBENZAPRINE HCL 10 MG
10 TABLET ORAL 3 TIMES DAILY PRN
Qty: 90 TABLET | Refills: 1 | Status: SHIPPED | OUTPATIENT
Start: 2023-08-25

## 2023-09-22 ENCOUNTER — OFFICE VISIT (OUTPATIENT)
Dept: PAIN MEDICINE | Facility: CLINIC | Age: 53
End: 2023-09-22
Payer: COMMERCIAL

## 2023-09-22 VITALS
HEIGHT: 68 IN | RESPIRATION RATE: 18 BRPM | WEIGHT: 142.6 LBS | TEMPERATURE: 96.6 F | DIASTOLIC BLOOD PRESSURE: 94 MMHG | SYSTOLIC BLOOD PRESSURE: 154 MMHG | BODY MASS INDEX: 21.61 KG/M2

## 2023-09-22 DIAGNOSIS — Z98.890 S/P LAMINECTOMY: ICD-10-CM

## 2023-09-22 DIAGNOSIS — G89.4 CHRONIC PAIN SYNDROME: ICD-10-CM

## 2023-09-22 DIAGNOSIS — M53.3 SACROILIAC JOINT DYSFUNCTION OF BOTH SIDES: ICD-10-CM

## 2023-09-22 DIAGNOSIS — G62.9 PERIPHERAL POLYNEUROPATHY: ICD-10-CM

## 2023-09-22 DIAGNOSIS — Z87.39 HISTORY OF OSTEOMYELITIS: ICD-10-CM

## 2023-09-22 PROCEDURE — 99214 OFFICE O/P EST MOD 30 MIN: CPT | Performed by: NURSE PRACTITIONER

## 2023-09-22 PROCEDURE — 80305 DRUG TEST PRSMV DIR OPT OBS: CPT | Performed by: NURSE PRACTITIONER

## 2023-09-22 PROCEDURE — 1159F MED LIST DOCD IN RCRD: CPT | Performed by: NURSE PRACTITIONER

## 2023-09-22 PROCEDURE — 1125F AMNT PAIN NOTED PAIN PRSNT: CPT | Performed by: NURSE PRACTITIONER

## 2023-09-22 PROCEDURE — 1160F RVW MEDS BY RX/DR IN RCRD: CPT | Performed by: NURSE PRACTITIONER

## 2023-09-22 RX ORDER — HYDROCODONE BITARTRATE AND ACETAMINOPHEN 5; 325 MG/1; MG/1
TABLET ORAL
Qty: 150 TABLET | Refills: 0 | Status: SHIPPED | OUTPATIENT
Start: 2023-09-22

## 2023-09-22 NOTE — PROGRESS NOTES
CHIEF COMPLAINT  Follow-up for back pain.    Subjective   Ana Maria Story is a 52 y.o. female  who presents for follow-up.  She has a history of back pain and extremity pain d/t peripheral neuropathy.  Today her pain is 3/10VAS in severity. She continues with Norco 5/325 4-5/day, Flexeril 10 mg 3/day, and Lyrica 200 mg 2-3/day (she does not take her medications if she is going to be driving). She reports that this medication regimen decreases her pain by a significant amount, she does state that this past week her pain has been worse. She does have a history of constipation, she states this is well managed currently. She denies any side effects including somnolence. ADLs by self. She has been using her back brace intermittently and this has also been helpful to her pain.     She has custody of 3 grandchildren (all under the age of 5).      History of epidural abscesses and iliopsoas abscess diagnosed on 11/2/2021.  Patient underwent laminectomy of T6, T12, and L2 with evacuation of epidural abscess on 11/4/2021. Patient had bilateral transgluteal drainage of pelvic abscess on 11/5/2021.     She continues maintenance dose of oral antibiotics (Keflex 500 mg once daily). She is followed by ID yearly.      Previously did not tolerate baclofen (caused sleep walking)    Back Pain  This is a chronic problem. The current episode started more than 1 year ago. The problem occurs constantly. The problem has been waxing and waning since onset. The pain is present in the lumbar spine and thoracic spine. The pain is at a severity of 3/10. The pain is moderate. Associated symptoms include headaches, numbness and weakness. Pertinent negatives include no abdominal pain, bladder incontinence, bowel incontinence, chest pain, dysuria or fever. She has tried analgesics and muscle relaxant (SI injection, norco, flexeril) for the symptoms. The treatment provided moderate relief.   Peripheral Neuropathy  This is a chronic problem. The  current episode started more than 1 year ago. The problem occurs constantly. The problem has been unchanged. Associated symptoms include arthralgias, fatigue, headaches, joint swelling, myalgias, numbness, a sore throat and weakness. Pertinent negatives include no abdominal pain, chest pain, chills, congestion, coughing, diaphoresis, fever, nausea, neck pain or vomiting. Nothing (cold weather) aggravates the symptoms. She has tried NSAIDs and oral narcotics (Norco 5/325, Lyrica 200 mg TID, Flexeril at HS, Elavil 150 mg HS) for the symptoms. The treatment provided moderate relief.      PEG Assessment   What number best describes your pain on average in the past week?8  What number best describes how, during the past week, pain has interfered with your enjoyment of life?7  What number best describes how, during the past week, pain has interfered with your general activity?  7    The following portions of the patient's history were reviewed and updated as appropriate: allergies, current medications, past family history, past medical history, past social history, past surgical history, and problem list.    Review of Systems   Constitutional:  Positive for fatigue. Negative for chills, diaphoresis and fever.   HENT:  Positive for sore throat. Negative for congestion.    Respiratory:  Negative for cough.    Cardiovascular:  Negative for chest pain.   Gastrointestinal:  Negative for abdominal pain, bowel incontinence, constipation, diarrhea, nausea and vomiting.   Genitourinary:  Negative for bladder incontinence, difficulty urinating and dysuria.   Musculoskeletal:  Positive for arthralgias, back pain, joint swelling and myalgias. Negative for neck pain.   Neurological:  Positive for weakness, numbness and headaches.   Psychiatric/Behavioral:  Positive for sleep disturbance. Negative for suicidal ideas. The patient is not nervous/anxious.      --  The aforementioned information the Chief Complaint section and above  "subjective data including any HPI data, and also the Review of Systems data, has been personally reviewed and affirmed.  --    Vitals:    09/22/23 0854   BP: 154/94   Resp: 18   Temp: 96.6 °F (35.9 °C)   Weight: 64.7 kg (142 lb 9.6 oz)   Height: 172.7 cm (67.99\")   PainSc:   3   PainLoc: Back     Objective   Physical Exam  Vitals and nursing note reviewed.   Constitutional:       Appearance: Normal appearance. She is well-developed.   Eyes:      General: Lids are normal.   Cardiovascular:      Rate and Rhythm: Normal rate.   Pulmonary:      Effort: Pulmonary effort is normal.   Musculoskeletal:      Thoracic back: Tenderness present. Decreased range of motion.      Lumbar back: Tenderness and bony tenderness present. Decreased range of motion.   Neurological:      Mental Status: She is alert and oriented to person, place, and time.   Psychiatric:         Attention and Perception: Attention normal.         Mood and Affect: Mood normal.         Speech: Speech normal.         Behavior: Behavior normal.         Judgment: Judgment normal.       Assessment & Plan   Diagnoses and all orders for this visit:    1. Chronic pain syndrome  -     HYDROcodone-acetaminophen (NORCO) 5-325 MG per tablet; Take one po every 4-6 hours prn severe pain. Max 5/day. DNF 9/28/2023.  Dispense: 150 tablet; Refill: 0  -     Urine Drug Screen Confirmation - Urine, Clean Catch; Future  -     POC Urine Drug Screen, Triage    2. S/P laminectomy  -     HYDROcodone-acetaminophen (NORCO) 5-325 MG per tablet; Take one po every 4-6 hours prn severe pain. Max 5/day. DNF 9/28/2023.  Dispense: 150 tablet; Refill: 0  -     Urine Drug Screen Confirmation - Urine, Clean Catch; Future  -     POC Urine Drug Screen, Triage    3. History of osteomyelitis  -     HYDROcodone-acetaminophen (NORCO) 5-325 MG per tablet; Take one po every 4-6 hours prn severe pain. Max 5/day. DNF 9/28/2023.  Dispense: 150 tablet; Refill: 0  -     Urine Drug Screen Confirmation - " Urine, Clean Catch; Future  -     POC Urine Drug Screen, Triage    4. Peripheral polyneuropathy  -     HYDROcodone-acetaminophen (NORCO) 5-325 MG per tablet; Take one po every 4-6 hours prn severe pain. Max 5/day. DNF 9/28/2023.  Dispense: 150 tablet; Refill: 0  -     Urine Drug Screen Confirmation - Urine, Clean Catch; Future  -     POC Urine Drug Screen, Triage    5. Sacroiliac joint dysfunction of both sides  -     HYDROcodone-acetaminophen (NORCO) 5-325 MG per tablet; Take one po every 4-6 hours prn severe pain. Max 5/day. DNF 9/28/2023.  Dispense: 150 tablet; Refill: 0  -     Urine Drug Screen Confirmation - Urine, Clean Catch; Future  -     POC Urine Drug Screen, Triage        Ana Maria Story reports a pain score of 3.  Given her pain assessment as noted, treatment options were discussed and the following options were decided upon as a follow-up plan to address the patient's pain: adjustment in prescribed opioid    --- Routine UDS in office today as part of monitoring requirements for controlled substances.  The specimen was viewed and the immunoassay result reviewed and is +OPI.  This specimen will be sent to [facility] for confirmation.     --- CSA updated in office today  --- Increase Norco 5/325 to sig of #150. No adverse effects with medication. Regarding continuation of opioids, there is no evidence of aberrant behavior or any red flags.  The patient continues with appropriate response to opioid therapy. ADL's remain intact by self.   --- Follow-up 1 month or sooner if needed.      POLLY REPORT  As part of the patient's treatment plan, I am prescribing controlled substances. The patient has been made aware of appropriate use of such medications, including potential risk of somnolence, limited ability to drive and/or work safely, and the potential for dependence or overdose. It has also been made clear that these medications are for use by this patient only, without concomitant use of  alcohol or other substances unless prescribed.     Patient has completed prescribing agreement detailing terms of continued prescribing of controlled substances, including monitoring POLLY reports, urine drug screening, and pill counts if necessary. The patient is aware that inappropriate use will results in cessation of prescribing such medications.    As the clinician, I personally reviewed the POLLY from 9/22/2023 while the patient was in the office today.    History and physical exam exhibit continued safe and appropriate use of controlled substances.    Dictated utilizing Dragon dictation.

## 2023-10-27 ENCOUNTER — OFFICE VISIT (OUTPATIENT)
Dept: PAIN MEDICINE | Facility: CLINIC | Age: 53
End: 2023-10-27
Payer: COMMERCIAL

## 2023-10-27 VITALS
SYSTOLIC BLOOD PRESSURE: 136 MMHG | TEMPERATURE: 97.7 F | WEIGHT: 146 LBS | HEIGHT: 68 IN | BODY MASS INDEX: 22.13 KG/M2 | DIASTOLIC BLOOD PRESSURE: 72 MMHG | HEART RATE: 87 BPM | OXYGEN SATURATION: 96 %

## 2023-10-27 DIAGNOSIS — G89.4 CHRONIC PAIN SYNDROME: ICD-10-CM

## 2023-10-27 DIAGNOSIS — Z98.890 S/P LAMINECTOMY: ICD-10-CM

## 2023-10-27 DIAGNOSIS — Z87.39 HISTORY OF OSTEOMYELITIS: ICD-10-CM

## 2023-10-27 DIAGNOSIS — G62.9 PERIPHERAL POLYNEUROPATHY: ICD-10-CM

## 2023-10-27 DIAGNOSIS — M53.3 SACROILIAC JOINT DYSFUNCTION OF BOTH SIDES: ICD-10-CM

## 2023-10-27 PROCEDURE — 1125F AMNT PAIN NOTED PAIN PRSNT: CPT | Performed by: NURSE PRACTITIONER

## 2023-10-27 PROCEDURE — 99214 OFFICE O/P EST MOD 30 MIN: CPT | Performed by: NURSE PRACTITIONER

## 2023-10-27 PROCEDURE — 1159F MED LIST DOCD IN RCRD: CPT | Performed by: NURSE PRACTITIONER

## 2023-10-27 PROCEDURE — 1160F RVW MEDS BY RX/DR IN RCRD: CPT | Performed by: NURSE PRACTITIONER

## 2023-10-27 RX ORDER — HYDROCODONE BITARTRATE AND ACETAMINOPHEN 5; 325 MG/1; MG/1
TABLET ORAL
Qty: 150 TABLET | Refills: 0 | Status: SHIPPED | OUTPATIENT
Start: 2023-10-27

## 2023-10-27 RX ORDER — CYCLOBENZAPRINE HCL 10 MG
10 TABLET ORAL 3 TIMES DAILY PRN
Qty: 90 TABLET | Refills: 1 | Status: SHIPPED | OUTPATIENT
Start: 2023-10-27

## 2023-10-27 RX ORDER — PREGABALIN 200 MG/1
200 CAPSULE ORAL 3 TIMES DAILY
Qty: 90 CAPSULE | Refills: 1 | Status: SHIPPED | OUTPATIENT
Start: 2023-10-27

## 2023-10-27 NOTE — PROGRESS NOTES
"CHIEF COMPLAINT  F/U back pain- patient states that her pain has remained the same since her last visit.     Subjective   Ana Maria Story is a 52 y.o. female  who presents for follow-up.  She has a history of back pain.  Today her pain is 6-7/10VAS in severity (Has not had her medication d/t driving). She continues with Norco 5/325 4-5/day (utilizing 5/day more frequently), Flexeril 10 mg 3/day, and Lyrica 200 mg 2-3/day (she does not take her medications if she is going to be driving). This medication regimen decreases her pain by \"at least half\". ADLs by self. She denies any side effects including somnolence or constipation.     She has custody of 3 grandchildren (all under the age of 5).      History of epidural abscesses and iliopsoas abscess diagnosed on 11/2/2021.  Patient underwent laminectomy of T6, T12, and L2 with evacuation of epidural abscess on 11/4/2021. Patient had bilateral transgluteal drainage of pelvic abscess on 11/5/2021.     She continues maintenance dose of oral antibiotics (Keflex 500 mg once daily). She is followed by ID yearly.      Previously did not tolerate baclofen (caused sleep walking)    Back Pain  This is a chronic problem. The current episode started more than 1 year ago. The problem occurs constantly. The problem has been waxing and waning since onset. The pain is present in the lumbar spine and thoracic spine. The pain is at a severity of 6/10. The pain is moderate. Associated symptoms include numbness and weakness. Pertinent negatives include no abdominal pain, bladder incontinence, bowel incontinence, chest pain, dysuria, fever or headaches. She has tried analgesics and muscle relaxant (SI injection, norco, flexeril) for the symptoms. The treatment provided moderate relief.   Peripheral Neuropathy  This is a chronic problem. The current episode started more than 1 year ago. The problem occurs constantly. The problem has been waxing and waning. Associated symptoms include " arthralgias, fatigue, joint swelling, myalgias, numbness, a sore throat and weakness. Pertinent negatives include no abdominal pain, chest pain, chills, congestion, coughing, diaphoresis, fever, headaches, nausea, neck pain or vomiting. Nothing (cold weather) aggravates the symptoms. She has tried NSAIDs and oral narcotics (Norco 5/325, Lyrica 200 mg TID, Flexeril at HS, Elavil 150 mg HS) for the symptoms. The treatment provided moderate relief.      PEG Assessment   What number best describes your pain on average in the past week?7  What number best describes how, during the past week, pain has interfered with your enjoyment of life?5  What number best describes how, during the past week, pain has interfered with your general activity?  5    The following portions of the patient's history were reviewed and updated as appropriate: allergies, current medications, past family history, past medical history, past social history, past surgical history, and problem list.    Review of Systems   Constitutional:  Positive for fatigue. Negative for activity change (increased), chills, diaphoresis and fever.   HENT:  Positive for sore throat. Negative for congestion.    Eyes:  Negative for visual disturbance.   Respiratory:  Negative for cough, chest tightness and shortness of breath.    Cardiovascular:  Negative for chest pain.   Gastrointestinal:  Positive for constipation and diarrhea. Negative for abdominal pain, bowel incontinence, nausea and vomiting.   Genitourinary:  Negative for bladder incontinence, difficulty urinating, dyspareunia and dysuria.   Musculoskeletal:  Positive for arthralgias, back pain, joint swelling and myalgias. Negative for neck pain.   Neurological:  Positive for weakness and numbness. Negative for dizziness, light-headedness and headaches.   Psychiatric/Behavioral:  Positive for sleep disturbance. Negative for agitation. The patient is not nervous/anxious.      --  The aforementioned information  "the Chief Complaint section and above subjective data including any HPI data, and also the Review of Systems data, has been personally reviewed and affirmed.  --    Vitals:    10/27/23 1349   BP: 136/72   Pulse: 87   Temp: 97.7 °F (36.5 °C)   SpO2: 96%   Weight: 66.2 kg (146 lb)   Height: 172.7 cm (68\")   PainSc:   6   PainLoc: Back     Objective   Physical Exam  Vitals and nursing note reviewed.   Constitutional:       Appearance: Normal appearance. She is well-developed.   Eyes:      General: Lids are normal.   Cardiovascular:      Rate and Rhythm: Normal rate.   Pulmonary:      Effort: Pulmonary effort is normal.   Musculoskeletal:      Thoracic back: Tenderness present. Decreased range of motion.      Lumbar back: Tenderness and bony tenderness present. Decreased range of motion.   Neurological:      Mental Status: She is alert and oriented to person, place, and time.   Psychiatric:         Attention and Perception: Attention normal.         Mood and Affect: Mood normal.         Speech: Speech normal.         Behavior: Behavior normal.         Judgment: Judgment normal.       Assessment & Plan   Diagnoses and all orders for this visit:    1. Chronic pain syndrome  -     HYDROcodone-acetaminophen (NORCO) 5-325 MG per tablet; Take one po every 4-6 hours prn severe pain. Max 5/day. DNF 10/28/2023  Dispense: 150 tablet; Refill: 0  -     pregabalin (LYRICA) 200 MG capsule; Take 1 capsule by mouth 3 (Three) Times a Day.  Dispense: 90 capsule; Refill: 1    2. S/P laminectomy  -     HYDROcodone-acetaminophen (NORCO) 5-325 MG per tablet; Take one po every 4-6 hours prn severe pain. Max 5/day. DNF 10/28/2023  Dispense: 150 tablet; Refill: 0    3. History of osteomyelitis  -     HYDROcodone-acetaminophen (NORCO) 5-325 MG per tablet; Take one po every 4-6 hours prn severe pain. Max 5/day. DNF 10/28/2023  Dispense: 150 tablet; Refill: 0    4. Peripheral polyneuropathy  -     HYDROcodone-acetaminophen (NORCO) 5-325 MG per " tablet; Take one po every 4-6 hours prn severe pain. Max 5/day. DNF 10/28/2023  Dispense: 150 tablet; Refill: 0    5. Sacroiliac joint dysfunction of both sides  -     HYDROcodone-acetaminophen (NORCO) 5-325 MG per tablet; Take one po every 4-6 hours prn severe pain. Max 5/day. DNF 10/28/2023  Dispense: 150 tablet; Refill: 0    Other orders  -     cyclobenzaprine (FLEXERIL) 10 MG tablet; Take 1 tablet by mouth 3 (Three) Times a Day As Needed for Muscle Spasms. for muscle spams  Dispense: 90 tablet; Refill: 1      Ana Maria Story reports a pain score of 6.  Given her pain assessment as noted, treatment options were discussed and the following options were decided upon as a follow-up plan to address the patient's pain: continuation of current treatment plan for pain.    --- The urine drug screen confirmation from 9/22/2023 has been reviewed and the result is appropriate based on patient history and POLLY report  --- CSA updated 9/22/2023  --- Refill [place] 5/325. Fill date 10/28/2023 applied. Patient appears stable with current regimen. No adverse effects. Regarding continuation of opioids, there is no evidence of aberrant behavior or any red flags.  The patient continues with appropriate response to opioid therapy. ADL's remain intact by self.   --- Refill Lyrica and flexeril  --- Follow-up 2 months or sooner if needed.      POLLY REPORT  As part of the patient's treatment plan, I am prescribing controlled substances. The patient has been made aware of appropriate use of such medications, including potential risk of somnolence, limited ability to drive and/or work safely, and the potential for dependence or overdose. It has also been made clear that these medications are for use by this patient only, without concomitant use of alcohol or other substances unless prescribed.     Patient has completed prescribing agreement detailing terms of continued prescribing of controlled substances, including monitoring POLLY  reports, urine drug screening, and pill counts if necessary. The patient is aware that inappropriate use will results in cessation of prescribing such medications.    As the clinician, I personally reviewed the POLLY from 10/27/2023 while the patient was in the office today.    History and physical exam exhibit continued safe and appropriate use of controlled substances.    Dictated utilizing Dragon dictation.

## 2023-11-24 DIAGNOSIS — Z87.39 HISTORY OF OSTEOMYELITIS: ICD-10-CM

## 2023-11-24 DIAGNOSIS — G62.9 PERIPHERAL POLYNEUROPATHY: ICD-10-CM

## 2023-11-24 DIAGNOSIS — M53.3 SACROILIAC JOINT DYSFUNCTION OF BOTH SIDES: ICD-10-CM

## 2023-11-24 DIAGNOSIS — Z98.890 S/P LAMINECTOMY: ICD-10-CM

## 2023-11-24 DIAGNOSIS — G89.4 CHRONIC PAIN SYNDROME: ICD-10-CM

## 2023-11-28 ENCOUNTER — TRANSCRIBE ORDERS (OUTPATIENT)
Dept: ADMINISTRATIVE | Facility: HOSPITAL | Age: 53
End: 2023-11-28
Payer: COMMERCIAL

## 2023-11-28 ENCOUNTER — PRIOR AUTHORIZATION (OUTPATIENT)
Dept: PAIN MEDICINE | Facility: CLINIC | Age: 53
End: 2023-11-28
Payer: COMMERCIAL

## 2023-11-28 DIAGNOSIS — Z12.31 ENCOUNTER FOR SCREENING MAMMOGRAM FOR BREAST CANCER: Primary | ICD-10-CM

## 2023-11-28 RX ORDER — HYDROCODONE BITARTRATE AND ACETAMINOPHEN 5; 325 MG/1; MG/1
TABLET ORAL
Qty: 150 TABLET | Refills: 0 | Status: SHIPPED | OUTPATIENT
Start: 2023-11-28

## 2023-11-29 NOTE — TELEPHONE ENCOUNTER
Insurance sent a PA letter stating they needed more info on the patients dx so I started an appeal and faxed it to insurance,will wait on outcome informed pt she verbally understood.

## 2023-12-27 ENCOUNTER — OFFICE VISIT (OUTPATIENT)
Dept: PAIN MEDICINE | Facility: CLINIC | Age: 53
End: 2023-12-27
Payer: COMMERCIAL

## 2023-12-27 VITALS
WEIGHT: 144 LBS | DIASTOLIC BLOOD PRESSURE: 82 MMHG | SYSTOLIC BLOOD PRESSURE: 140 MMHG | OXYGEN SATURATION: 96 % | RESPIRATION RATE: 12 BRPM | HEIGHT: 68 IN | TEMPERATURE: 96.8 F | BODY MASS INDEX: 21.82 KG/M2 | HEART RATE: 69 BPM

## 2023-12-27 DIAGNOSIS — Z79.899 ENCOUNTER FOR LONG-TERM (CURRENT) USE OF MEDICATIONS: ICD-10-CM

## 2023-12-27 DIAGNOSIS — G89.4 CHRONIC PAIN SYNDROME: Primary | ICD-10-CM

## 2023-12-27 DIAGNOSIS — Z98.890 S/P LAMINECTOMY: ICD-10-CM

## 2023-12-27 DIAGNOSIS — Z87.39 HISTORY OF OSTEOMYELITIS: ICD-10-CM

## 2023-12-27 DIAGNOSIS — M53.3 SACROILIAC JOINT DYSFUNCTION OF BOTH SIDES: ICD-10-CM

## 2023-12-27 DIAGNOSIS — G62.9 PERIPHERAL POLYNEUROPATHY: ICD-10-CM

## 2023-12-27 PROCEDURE — 1160F RVW MEDS BY RX/DR IN RCRD: CPT | Performed by: NURSE PRACTITIONER

## 2023-12-27 PROCEDURE — 99214 OFFICE O/P EST MOD 30 MIN: CPT | Performed by: NURSE PRACTITIONER

## 2023-12-27 PROCEDURE — 1159F MED LIST DOCD IN RCRD: CPT | Performed by: NURSE PRACTITIONER

## 2023-12-27 PROCEDURE — 1125F AMNT PAIN NOTED PAIN PRSNT: CPT | Performed by: NURSE PRACTITIONER

## 2023-12-27 RX ORDER — HYDROCODONE BITARTRATE AND ACETAMINOPHEN 5; 325 MG/1; MG/1
TABLET ORAL
Qty: 150 TABLET | Refills: 0 | Status: SHIPPED | OUTPATIENT
Start: 2023-12-27

## 2023-12-27 RX ORDER — CYCLOBENZAPRINE HCL 10 MG
10 TABLET ORAL 3 TIMES DAILY PRN
Qty: 90 TABLET | Refills: 1 | Status: SHIPPED | OUTPATIENT
Start: 2023-12-27

## 2023-12-27 RX ORDER — PREGABALIN 200 MG/1
200 CAPSULE ORAL 3 TIMES DAILY
Qty: 90 CAPSULE | Refills: 1 | Status: SHIPPED | OUTPATIENT
Start: 2023-12-27

## 2023-12-27 NOTE — PROGRESS NOTES
CHIEF COMPLAINT  BACK PAIN  Patient reports increased pain due to more activity.     Subjective   Ana Maria Story is a 53 y.o. female  who presents for follow-up.  She has a history of back pain.  Today her pain is 8/10VAS in severity (has not taken her medication today, she avoids this while driving). She state that her pain is increased d/t increased activity level d/t the holidays.  She continues with Norco 5/325 5/day, Flexeril 10 mg 2-3/day, and Lyrica 200 mg 3/day (she does not take her medications if she is going to be driving). This medication regimen decreases her pain to a tolerable level. She denies any side effects including somnolence or constipation. ADLs by self.     She has custody of her 3 young grandchildren.      History of epidural abscesses and iliopsoas abscess diagnosed on 11/2/2021.  Patient underwent laminectomy of T6, T12, and L2 with evacuation of epidural abscess on 11/4/2021. Patient had bilateral transgluteal drainage of pelvic abscess on 11/5/2021.     She continues maintenance dose of oral antibiotics (Keflex 500 mg once daily). She is followed by ID yearly.      Previously did not tolerate baclofen (caused sleep walking)    Back Pain  This is a chronic problem. The current episode started more than 1 year ago. The problem occurs constantly. The problem has been gradually worsening since onset. The pain is present in the lumbar spine and thoracic spine. The pain is at a severity of 8/10. The pain is moderate. Associated symptoms include numbness and weakness. Pertinent negatives include no abdominal pain, bladder incontinence, bowel incontinence, chest pain, dysuria, fever or headaches. She has tried analgesics and muscle relaxant (SI injection, norco, flexeril) for the symptoms. The treatment provided moderate relief.   Peripheral Neuropathy  This is a chronic problem. The current episode started more than 1 year ago. The problem occurs constantly. The problem has been gradually  worsening (8/10VAS). Associated symptoms include arthralgias, coughing, fatigue, joint swelling, myalgias, neck pain, numbness, a sore throat and weakness. Pertinent negatives include no abdominal pain, chest pain, chills, congestion, diaphoresis, fever, headaches, nausea or vomiting. Nothing (cold weather) aggravates the symptoms. She has tried NSAIDs and oral narcotics (Norco 5/325, Lyrica 200 mg TID, Flexeril at HS, Elavil 150 mg HS) for the symptoms. The treatment provided moderate relief.      PEG Assessment   What number best describes your pain on average in the past week?8  What number best describes how, during the past week, pain has interfered with your enjoyment of life?9  What number best describes how, during the past week, pain has interfered with your general activity?  9    The following portions of the patient's history were reviewed and updated as appropriate: allergies, current medications, past family history, past medical history, past social history, past surgical history, and problem list.    Review of Systems   Constitutional:  Positive for activity change and fatigue. Negative for chills, diaphoresis and fever.   HENT:  Positive for sore throat. Negative for congestion.    Respiratory:  Positive for cough. Negative for chest tightness.    Cardiovascular:  Negative for chest pain.   Gastrointestinal:  Positive for constipation and diarrhea. Negative for abdominal pain, bowel incontinence, nausea and vomiting.   Genitourinary:  Negative for bladder incontinence, difficulty urinating and dysuria.   Musculoskeletal:  Positive for arthralgias, back pain, joint swelling, myalgias and neck pain.   Neurological:  Positive for weakness and numbness. Negative for dizziness, light-headedness and headaches.   Psychiatric/Behavioral:  Positive for sleep disturbance. Negative for agitation and suicidal ideas. The patient is not nervous/anxious.      --  The aforementioned information the Chief Complaint  "section and above subjective data including any HPI data, and also the Review of Systems data, has been personally reviewed and affirmed.  --    Vitals:    12/27/23 0848   BP: 140/82   BP Location: Right arm   Patient Position: Sitting   Cuff Size: Adult   Pulse: 69   Resp: 12   Temp: 96.8 °F (36 °C)   TempSrc: Temporal   SpO2: 96%   Weight: 65.3 kg (144 lb)   Height: 172.7 cm (68\")   PainSc:   8     Objective   Physical Exam  Vitals and nursing note reviewed.   Constitutional:       Appearance: Normal appearance. She is well-developed.   Eyes:      General: Lids are normal.   Cardiovascular:      Rate and Rhythm: Normal rate.   Pulmonary:      Effort: Pulmonary effort is normal.   Musculoskeletal:      Lumbar back: Tenderness and bony tenderness present. Decreased range of motion.   Neurological:      Mental Status: She is alert and oriented to person, place, and time.   Psychiatric:         Attention and Perception: Attention normal.         Mood and Affect: Mood normal.         Speech: Speech normal.         Behavior: Behavior normal.         Judgment: Judgment normal.       Assessment & Plan   Diagnoses and all orders for this visit:    1. Chronic pain syndrome (Primary)  -     HYDROcodone-acetaminophen (NORCO) 5-325 MG per tablet; Take one po every 4-6 hours prn severe pain. Max 5/day.  Dispense: 150 tablet; Refill: 0  -     pregabalin (LYRICA) 200 MG capsule; Take 1 capsule by mouth 3 (Three) Times a Day.  Dispense: 90 capsule; Refill: 1  -     cyclobenzaprine (FLEXERIL) 10 MG tablet; Take 1 tablet by mouth 3 (Three) Times a Day As Needed for Muscle Spasms. for muscle spams  Dispense: 90 tablet; Refill: 1    2. S/P laminectomy  -     HYDROcodone-acetaminophen (NORCO) 5-325 MG per tablet; Take one po every 4-6 hours prn severe pain. Max 5/day.  Dispense: 150 tablet; Refill: 0  -     cyclobenzaprine (FLEXERIL) 10 MG tablet; Take 1 tablet by mouth 3 (Three) Times a Day As Needed for Muscle Spasms. for muscle " spams  Dispense: 90 tablet; Refill: 1    3. History of osteomyelitis  -     HYDROcodone-acetaminophen (NORCO) 5-325 MG per tablet; Take one po every 4-6 hours prn severe pain. Max 5/day.  Dispense: 150 tablet; Refill: 0  -     cyclobenzaprine (FLEXERIL) 10 MG tablet; Take 1 tablet by mouth 3 (Three) Times a Day As Needed for Muscle Spasms. for muscle spams  Dispense: 90 tablet; Refill: 1    4. Peripheral polyneuropathy  -     HYDROcodone-acetaminophen (NORCO) 5-325 MG per tablet; Take one po every 4-6 hours prn severe pain. Max 5/day.  Dispense: 150 tablet; Refill: 0    5. Sacroiliac joint dysfunction of both sides  -     HYDROcodone-acetaminophen (NORCO) 5-325 MG per tablet; Take one po every 4-6 hours prn severe pain. Max 5/day.  Dispense: 150 tablet; Refill: 0    6. Encounter for long-term (current) use of medications      Ana Maria HILLARY Story reports a pain score of 8.  Given her pain assessment as noted, treatment options were discussed and the following options were decided upon as a follow-up plan to address the patient's pain: continuation of current treatment plan for pain.    --- The urine drug screen confirmation from 9/22/2023 has been reviewed and the result is appropriate based on patient history and POLLY report  --- CSA updated 9/22/2023  --- Discussed changing her regimen to Norco 7.5/325 every 6 hours PRN which would be a slight MEDD increase, she states she would like to wait on this. Refill Norco 5/325. Fill date 12/31/2023 applied. Patient appears stable with current regimen. No adverse effects. Regarding continuation of opioids, there is no evidence of aberrant behavior or any red flags.  The patient continues with appropriate response to opioid therapy. ADL's remain intact by self.   --- Refill Lyrica  --- Follow-up 2 months or sooner if needed.      POLLY REPORT  As part of the patient's treatment plan, I am prescribing controlled substances. The patient has been made aware of appropriate use  of such medications, including potential risk of somnolence, limited ability to drive and/or work safely, and the potential for dependence or overdose. It has also been made clear that these medications are for use by this patient only, without concomitant use of alcohol or other substances unless prescribed.     Patient has completed prescribing agreement detailing terms of continued prescribing of controlled substances, including monitoring POLLY reports, urine drug screening, and pill counts if necessary. The patient is aware that inappropriate use will results in cessation of prescribing such medications.    As the clinician, I personally reviewed the POLLY from 12/27/2023 while the patient was in the office today.    History and physical exam exhibit continued safe and appropriate use of controlled substances.    Dictated utilizing Dragon dictation.

## 2024-01-24 DIAGNOSIS — Z98.890 S/P LAMINECTOMY: ICD-10-CM

## 2024-01-24 DIAGNOSIS — Z87.39 HISTORY OF OSTEOMYELITIS: ICD-10-CM

## 2024-01-24 DIAGNOSIS — G62.9 PERIPHERAL POLYNEUROPATHY: ICD-10-CM

## 2024-01-24 DIAGNOSIS — M53.3 SACROILIAC JOINT DYSFUNCTION OF BOTH SIDES: ICD-10-CM

## 2024-01-24 DIAGNOSIS — G89.4 CHRONIC PAIN SYNDROME: ICD-10-CM

## 2024-01-24 RX ORDER — HYDROCODONE BITARTRATE AND ACETAMINOPHEN 5; 325 MG/1; MG/1
TABLET ORAL
Qty: 150 TABLET | Refills: 0 | Status: SHIPPED | OUTPATIENT
Start: 2024-01-24

## 2024-02-21 ENCOUNTER — OFFICE VISIT (OUTPATIENT)
Dept: PAIN MEDICINE | Facility: CLINIC | Age: 54
End: 2024-02-21
Payer: COMMERCIAL

## 2024-02-21 VITALS
OXYGEN SATURATION: 92 % | HEART RATE: 61 BPM | BODY MASS INDEX: 22.97 KG/M2 | RESPIRATION RATE: 20 BRPM | SYSTOLIC BLOOD PRESSURE: 134 MMHG | TEMPERATURE: 96.9 F | HEIGHT: 68 IN | WEIGHT: 151.6 LBS | DIASTOLIC BLOOD PRESSURE: 78 MMHG

## 2024-02-21 DIAGNOSIS — Z87.39 HISTORY OF OSTEOMYELITIS: ICD-10-CM

## 2024-02-21 DIAGNOSIS — G89.4 CHRONIC PAIN SYNDROME: ICD-10-CM

## 2024-02-21 DIAGNOSIS — Z98.890 S/P LAMINECTOMY: Primary | ICD-10-CM

## 2024-02-21 DIAGNOSIS — M47.816 SPONDYLOSIS OF LUMBAR REGION WITHOUT MYELOPATHY OR RADICULOPATHY: ICD-10-CM

## 2024-02-21 RX ORDER — HYDROCODONE BITARTRATE AND ACETAMINOPHEN 7.5; 325 MG/1; MG/1
1 TABLET ORAL TAKE AS DIRECTED
Qty: 150 TABLET | Refills: 0 | Status: SHIPPED | OUTPATIENT
Start: 2024-02-21

## 2024-02-21 RX ORDER — CYCLOBENZAPRINE HCL 10 MG
10 TABLET ORAL 3 TIMES DAILY PRN
Qty: 90 TABLET | Refills: 0 | Status: SHIPPED | OUTPATIENT
Start: 2024-02-21

## 2024-02-21 RX ORDER — PREGABALIN 200 MG/1
200 CAPSULE ORAL 3 TIMES DAILY
Qty: 90 CAPSULE | Refills: 0 | Status: SHIPPED | OUTPATIENT
Start: 2024-02-21

## 2024-02-21 NOTE — PROGRESS NOTES
CHIEF COMPLAINT  Back pain  Pain fluctuates pt states states she had a fall a week ago.    Subjective   Ana Maria Story is a 53 y.o. female  who presents for follow-up.  She has a history of back pain.  Today her pain is 7/10VAS in severity. She states that she fell on Thursday while carrying groceries into her house. She had her coat over her arm which caught on the rail of the ramp she was walking up. This caused her to fall. She states that her back pain has been increasing. She denies any new weakness in her lower extremities. She denies any bowel or bladder dysfunction. She states that her pain is interfering with her ability to sleep and to care for her grandchildren.     She continues with Norco 5/325 5/day, Flexeril 10 mg 3/day, and Lyrica 200 mg 3/day (she does not take her medications if she is going to be driving). She states that her medication regimen is not working as well as it has previously. She has custody of her 3 young grandchildren. She is now caring for her grandchildren alone d/t being  from her  and this has increased her activity level significantly. She denies any side effects with her regimen.      History of epidural abscesses and iliopsoas abscess diagnosed on 11/2/2021.  Patient underwent laminectomy of T6, T12, and L2 with evacuation of epidural abscess on 11/4/2021. Patient had bilateral transgluteal drainage of pelvic abscess on 11/5/2021.     She continues maintenance dose of oral antibiotics (Keflex 500 mg once daily). She is followed by ID yearly.      Previously did not tolerate baclofen (caused sleep walking), previously failed elavil for peripheral neuropathy.     Back Pain  This is a chronic problem. The current episode started more than 1 year ago. The problem occurs constantly. The problem has been gradually worsening since onset. The pain is present in the lumbar spine and thoracic spine. The quality of the pain is described as aching. The pain is at a  severity of 7/10. The pain is moderate. The symptoms are aggravated by bending, position, standing and sitting (walking). Associated symptoms include numbness and weakness. Pertinent negatives include no abdominal pain, bladder incontinence, bowel incontinence, chest pain, dysuria, fever or headaches. She has tried analgesics and muscle relaxant (SI injection, norco, flexeril) for the symptoms. The treatment provided moderate relief.   Peripheral Neuropathy  This is a chronic problem. The current episode started more than 1 year ago. The problem occurs constantly. The problem has been unchanged (7/10VAS). Associated symptoms include arthralgias, joint swelling, myalgias, neck pain, numbness, a sore throat and weakness. Pertinent negatives include no abdominal pain, chest pain, chills, congestion, coughing, diaphoresis, fatigue, fever, headaches, nausea or vomiting. Nothing (cold weather) aggravates the symptoms. She has tried NSAIDs and oral narcotics (Norco 5/325, Lyrica 200 mg TID, Flexeril at HS, Elavil 150 mg HS) for the symptoms. The treatment provided moderate relief.      PEG Assessment   What number best describes your pain on average in the past week?7  What number best describes how, during the past week, pain has interfered with your enjoyment of life?7  What number best describes how, during the past week, pain has interfered with your general activity?  7    The following portions of the patient's history were reviewed and updated as appropriate: allergies, current medications, past family history, past medical history, past social history, past surgical history, and problem list.    Review of Systems   Constitutional:  Negative for activity change, chills, diaphoresis, fatigue and fever.   HENT:  Positive for sore throat. Negative for congestion.    Respiratory:  Negative for cough and chest tightness.    Cardiovascular:  Negative for chest pain.   Gastrointestinal:  Positive for constipation and  "diarrhea. Negative for abdominal pain, bowel incontinence, nausea and vomiting.   Genitourinary:  Negative for bladder incontinence, difficulty urinating and dysuria.   Musculoskeletal:  Positive for arthralgias, back pain, joint swelling, myalgias and neck pain.   Neurological:  Positive for weakness and numbness. Negative for dizziness, light-headedness and headaches.   Psychiatric/Behavioral:  Positive for sleep disturbance. Negative for agitation and suicidal ideas. The patient is not nervous/anxious.      --  The aforementioned information the Chief Complaint section and above subjective data including any HPI data, and also the Review of Systems data, has been personally reviewed and affirmed.  --    Vitals:    02/21/24 0852   BP: 134/78   BP Location: Left arm   Patient Position: Sitting   Cuff Size: Adult   Pulse: 61   Resp: 20   Temp: 96.9 °F (36.1 °C)   TempSrc: Temporal   SpO2: 92%   Weight: 68.8 kg (151 lb 9.6 oz)   Height: 172.7 cm (68\")   PainSc:   7     Objective   Physical Exam  Vitals and nursing note reviewed.   Constitutional:       Appearance: Normal appearance. She is well-developed.   Eyes:      General: Lids are normal.   Cardiovascular:      Rate and Rhythm: Normal rate.   Pulmonary:      Effort: Pulmonary effort is normal.   Musculoskeletal:      Lumbar back: Tenderness and bony tenderness present. Decreased range of motion.   Neurological:      Mental Status: She is alert and oriented to person, place, and time.      Gait: Gait normal.   Psychiatric:         Attention and Perception: Attention normal.         Mood and Affect: Mood normal.         Speech: Speech normal.         Behavior: Behavior normal.         Judgment: Judgment normal.       Assessment & Plan   Diagnoses and all orders for this visit:    1. S/P laminectomy (Primary)  -     HYDROcodone-acetaminophen (NORCO) 7.5-325 MG per tablet; Take 1 tablet by mouth Take As Directed. Take every 4 to 6 hours PRN  MAX 5/day  Dispense: 150 " tablet; Refill: 0  -     cyclobenzaprine (FLEXERIL) 10 MG tablet; Take 1 tablet by mouth 3 (Three) Times a Day As Needed for Muscle Spasms. for muscle spams  Dispense: 90 tablet; Refill: 0    2. History of osteomyelitis  -     HYDROcodone-acetaminophen (NORCO) 7.5-325 MG per tablet; Take 1 tablet by mouth Take As Directed. Take every 4 to 6 hours PRN  MAX 5/day  Dispense: 150 tablet; Refill: 0  -     cyclobenzaprine (FLEXERIL) 10 MG tablet; Take 1 tablet by mouth 3 (Three) Times a Day As Needed for Muscle Spasms. for muscle spams  Dispense: 90 tablet; Refill: 0    3. Spondylosis of lumbar region without myelopathy or radiculopathy  -     HYDROcodone-acetaminophen (NORCO) 7.5-325 MG per tablet; Take 1 tablet by mouth Take As Directed. Take every 4 to 6 hours PRN  MAX 5/day  Dispense: 150 tablet; Refill: 0    4. Chronic pain syndrome  -     cyclobenzaprine (FLEXERIL) 10 MG tablet; Take 1 tablet by mouth 3 (Three) Times a Day As Needed for Muscle Spasms. for muscle spams  Dispense: 90 tablet; Refill: 0  -     pregabalin (LYRICA) 200 MG capsule; Take 1 capsule by mouth 3 (Three) Times a Day.  Dispense: 90 capsule; Refill: 0      Ana Maria Story reports a pain score of 7.  Given her pain assessment as noted, treatment options were discussed and the following options were decided upon as a follow-up plan to address the patient's pain: prescription for opiod analgesics.    --- The urine drug screen confirmation from 9/22/2023 has been reviewed and the result is appropriate based on patient history and POLLY report  --- CSA updated 9/22/2023  --- Increase Norco to 7.5/325 every 4-6 hours #150 Max 5/day.  --- Refill Lyrica  --- Continue Flexeril  --- Follow-up 1 month or sooner if needed.      POLLY REPORT  As part of the patient's treatment plan, I am prescribing controlled substances. The patient has been made aware of appropriate use of such medications, including potential risk of somnolence, limited ability to drive  and/or work safely, and the potential for dependence or overdose. It has also been made clear that these medications are for use by this patient only, without concomitant use of alcohol or other substances unless prescribed.     Patient has completed prescribing agreement detailing terms of continued prescribing of controlled substances, including monitoring POLLY reports, urine drug screening, and pill counts if necessary. The patient is aware that inappropriate use will results in cessation of prescribing such medications.    As the clinician, I personally reviewed the POLLY from 2/21/2024 while the patient was in the office today.    History and physical exam exhibit continued safe and appropriate use of controlled substances.       Dictated utilizing Dragon dictation.

## 2024-03-15 DIAGNOSIS — Z87.39 HISTORY OF OSTEOMYELITIS: ICD-10-CM

## 2024-03-15 DIAGNOSIS — G89.4 CHRONIC PAIN SYNDROME: ICD-10-CM

## 2024-03-15 DIAGNOSIS — Z98.890 S/P LAMINECTOMY: ICD-10-CM

## 2024-03-15 RX ORDER — CYCLOBENZAPRINE HCL 10 MG
10 TABLET ORAL 3 TIMES DAILY PRN
Qty: 90 TABLET | Refills: 0 | Status: SHIPPED | OUTPATIENT
Start: 2024-03-15

## 2024-03-22 ENCOUNTER — OFFICE VISIT (OUTPATIENT)
Dept: PAIN MEDICINE | Facility: CLINIC | Age: 54
End: 2024-03-22
Payer: COMMERCIAL

## 2024-03-22 VITALS
TEMPERATURE: 97.8 F | SYSTOLIC BLOOD PRESSURE: 121 MMHG | WEIGHT: 138.6 LBS | HEIGHT: 68 IN | BODY MASS INDEX: 21.01 KG/M2 | HEART RATE: 96 BPM | DIASTOLIC BLOOD PRESSURE: 64 MMHG

## 2024-03-22 DIAGNOSIS — G62.9 PERIPHERAL POLYNEUROPATHY: Primary | ICD-10-CM

## 2024-03-22 DIAGNOSIS — Z87.39 HISTORY OF OSTEOMYELITIS: ICD-10-CM

## 2024-03-22 DIAGNOSIS — Z79.899 ENCOUNTER FOR LONG-TERM (CURRENT) USE OF MEDICATIONS: ICD-10-CM

## 2024-03-22 DIAGNOSIS — G89.4 CHRONIC PAIN SYNDROME: ICD-10-CM

## 2024-03-22 DIAGNOSIS — Z98.890 S/P LAMINECTOMY: ICD-10-CM

## 2024-03-22 DIAGNOSIS — M47.816 SPONDYLOSIS OF LUMBAR REGION WITHOUT MYELOPATHY OR RADICULOPATHY: ICD-10-CM

## 2024-03-22 PROCEDURE — 99214 OFFICE O/P EST MOD 30 MIN: CPT | Performed by: NURSE PRACTITIONER

## 2024-03-22 RX ORDER — PREGABALIN 200 MG/1
200 CAPSULE ORAL 3 TIMES DAILY
Qty: 90 CAPSULE | Refills: 0 | Status: SHIPPED | OUTPATIENT
Start: 2024-03-22

## 2024-03-22 RX ORDER — CYCLOBENZAPRINE HCL 10 MG
10 TABLET ORAL 3 TIMES DAILY PRN
Qty: 90 TABLET | Refills: 0 | Status: SHIPPED | OUTPATIENT
Start: 2024-03-22

## 2024-03-22 RX ORDER — HYDROCODONE BITARTRATE AND ACETAMINOPHEN 7.5; 325 MG/1; MG/1
1 TABLET ORAL TAKE AS DIRECTED
Qty: 150 TABLET | Refills: 0 | Status: SHIPPED | OUTPATIENT
Start: 2024-03-22

## 2024-03-22 NOTE — PROGRESS NOTES
CHIEF COMPLAINT  F/U back pain- patient states that her pain has improved since her last visit.     Subjective   Ana Maria Story is a 53 y.o. female  who presents for follow-up.  She has a history of back pain.  At her last office visit we increased her Norco from 5/325 to 7.5/325 every 4 to 6 hours Max 5/day.     Today her pain is 4/10VAS in severity. She is now utilizing Norco 7.5/325 5/day, Flexeril 10 mg 2-3/day, and Lyrica 200 mg TID and states that this medication regimen has decreased her pain a significant amount and has allowed her to increase her activity level including caring for her 3 grandchildren as well as walking on the treadmill. She denies any side effects including somnolence or constipation. ADLs by self.     History of epidural abscesses and iliopsoas abscess diagnosed on 11/2/2021.  Patient underwent laminectomy of T6, T12, and L2 with evacuation of epidural abscess on 11/4/2021. Patient had bilateral transgluteal drainage of pelvic abscess on 11/5/2021.     She continues maintenance dose of oral antibiotics (Keflex 500 mg once daily). She is followed by ID yearly.      Previously did not tolerate baclofen (caused sleep walking), previously failed elavil for peripheral neuropathy.     Back Pain  This is a chronic problem. The current episode started more than 1 year ago. The problem occurs constantly. Progression since onset: improved since last office visit. The pain is present in the lumbar spine and thoracic spine. The quality of the pain is described as aching. The pain is at a severity of 4/10. The pain is moderate. The symptoms are aggravated by bending, position, standing and sitting (walking). Associated symptoms include numbness and weakness. Pertinent negatives include no abdominal pain, bladder incontinence, bowel incontinence, chest pain, dysuria, fever or headaches. She has tried analgesics and muscle relaxant (SI injection, norco, flexeril) for the symptoms. The treatment  provided moderate relief.   Peripheral Neuropathy  This is a chronic problem. The current episode started more than 1 year ago. The problem occurs constantly. The problem has been unchanged (4/10VAS). Associated symptoms include arthralgias, congestion (r/t allergies), fatigue, joint swelling, myalgias, neck pain, numbness, a sore throat and weakness. Pertinent negatives include no abdominal pain, chest pain, chills, coughing, diaphoresis, fever, headaches, nausea or vomiting. Nothing (cold weather) aggravates the symptoms. She has tried NSAIDs and oral narcotics (Norco 5/325, Lyrica 200 mg TID, Flexeril at HS, Elavil 150 mg HS) for the symptoms. The treatment provided moderate relief.      PEG Assessment   What number best describes your pain on average in the past week?6  What number best describes how, during the past week, pain has interfered with your enjoyment of life?4  What number best describes how, during the past week, pain has interfered with your general activity?  4    The following portions of the patient's history were reviewed and updated as appropriate: allergies, current medications, past family history, past medical history, past social history, past surgical history, and problem list.    Review of Systems   Constitutional:  Positive for fatigue. Negative for activity change (increased), chills, diaphoresis and fever.   HENT:  Positive for congestion (r/t allergies) and sore throat.    Eyes:  Negative for visual disturbance.   Respiratory:  Negative for cough, chest tightness and shortness of breath.    Cardiovascular:  Negative for chest pain.   Gastrointestinal:  Positive for constipation. Negative for abdominal pain, bowel incontinence, diarrhea, nausea and vomiting.   Genitourinary:  Negative for bladder incontinence, difficulty urinating, dyspareunia and dysuria.   Musculoskeletal:  Positive for arthralgias, back pain, joint swelling, myalgias and neck pain.   Neurological:  Positive for  "weakness and numbness. Negative for dizziness, light-headedness and headaches.   Psychiatric/Behavioral:  Positive for sleep disturbance. Negative for agitation. The patient is not nervous/anxious.      --  The aforementioned information the Chief Complaint section and above subjective data including any HPI data, and also the Review of Systems data, has been personally reviewed and affirmed.  --    Vitals:    03/22/24 0924   BP: 121/64   Pulse: 96   Temp: 97.8 °F (36.6 °C)   Weight: 62.9 kg (138 lb 9.6 oz)   Height: 172.7 cm (68\")   PainSc:   4   PainLoc: Back     Objective   Physical Exam  Vitals and nursing note reviewed.   Constitutional:       Appearance: Normal appearance. She is well-developed.   Eyes:      General: Lids are normal.   Cardiovascular:      Rate and Rhythm: Normal rate.   Pulmonary:      Effort: Pulmonary effort is normal.   Musculoskeletal:      Lumbar back: Tenderness and bony tenderness present. Decreased range of motion.   Neurological:      Mental Status: She is alert and oriented to person, place, and time.   Psychiatric:         Attention and Perception: Attention normal.         Mood and Affect: Mood normal.         Speech: Speech normal.         Behavior: Behavior normal.         Judgment: Judgment normal.       Assessment & Plan   Diagnoses and all orders for this visit:    1. Peripheral polyneuropathy (Primary)    2. S/P laminectomy  -     cyclobenzaprine (FLEXERIL) 10 MG tablet; Take 1 tablet by mouth 3 (Three) Times a Day As Needed for Muscle Spasms. for muscle spams  Dispense: 90 tablet; Refill: 0  -     HYDROcodone-acetaminophen (NORCO) 7.5-325 MG per tablet; Take 1 tablet by mouth Take As Directed. Take every 4 to 6 hours PRN  MAX 5/day  Dispense: 150 tablet; Refill: 0    3. History of osteomyelitis  -     cyclobenzaprine (FLEXERIL) 10 MG tablet; Take 1 tablet by mouth 3 (Three) Times a Day As Needed for Muscle Spasms. for muscle spams  Dispense: 90 tablet; Refill: 0  -     " HYDROcodone-acetaminophen (NORCO) 7.5-325 MG per tablet; Take 1 tablet by mouth Take As Directed. Take every 4 to 6 hours PRN  MAX 5/day  Dispense: 150 tablet; Refill: 0    4. Chronic pain syndrome  -     cyclobenzaprine (FLEXERIL) 10 MG tablet; Take 1 tablet by mouth 3 (Three) Times a Day As Needed for Muscle Spasms. for muscle spams  Dispense: 90 tablet; Refill: 0  -     pregabalin (LYRICA) 200 MG capsule; Take 1 capsule by mouth 3 (Three) Times a Day.  Dispense: 90 capsule; Refill: 0    5. Spondylosis of lumbar region without myelopathy or radiculopathy  -     HYDROcodone-acetaminophen (NORCO) 7.5-325 MG per tablet; Take 1 tablet by mouth Take As Directed. Take every 4 to 6 hours PRN  MAX 5/day  Dispense: 150 tablet; Refill: 0    6. Encounter for long-term (current) use of medications      Ana Maria M Trelleufemia reports a pain score of 4.  Given her pain assessment as noted, treatment options were discussed and the following options were decided upon as a follow-up plan to address the patient's pain: continuation of current treatment plan for pain.    --- Routine UDS in office today as part of monitoring requirements for controlled substances.  The specimen was viewed and the immunoassay result reviewed and is +OPI.  This specimen will be sent to [facility] laboratory for confirmation.     --- CSA updated 9/22/2024  --- Opioid Risk is assessed as low. She has a long history of compliance with our clinic.   --- Refill [place] 7.5/325. Patient appears stable with current regimen. No adverse effects. Regarding continuation of opioids, there is no evidence of aberrant behavior or any red flags.  The patient continues with appropriate response to opioid therapy. ADL's remain intact by self.   --- Follow-up 2 months or sooner If needed.      POLLY REPORT  As part of the patient's treatment plan, I am prescribing controlled substances. The patient has been made aware of appropriate use of such medications, including potential  risk of somnolence, limited ability to drive and/or work safely, and the potential for dependence or overdose. It has also been made clear that these medications are for use by this patient only, without concomitant use of alcohol or other substances unless prescribed.     Patient has completed prescribing agreement detailing terms of continued prescribing of controlled substances, including monitoring POLLY reports, urine drug screening, and pill counts if necessary. The patient is aware that inappropriate use will results in cessation of prescribing such medications.    As the clinician, I personally reviewed the POLLY from 3/22/2024 while the patient was in the office today.    History and physical exam exhibit continued safe and appropriate use of controlled substances.    Dictated utilizing Dragon dictation.

## 2024-04-03 ENCOUNTER — TELEPHONE (OUTPATIENT)
Dept: PAIN MEDICINE | Facility: CLINIC | Age: 54
End: 2024-04-03
Payer: COMMERCIAL

## 2024-04-03 NOTE — TELEPHONE ENCOUNTER
Called left patient vm of  change in her appointment due to provider being out, we moved her from 5-22-24 to 5-20-24 at 8:20 am, if this doesn't work it can be changed.    HUB OK TO RESCHEDULE IF NEEDED

## 2024-04-16 DIAGNOSIS — G89.4 CHRONIC PAIN SYNDROME: ICD-10-CM

## 2024-04-16 DIAGNOSIS — Z87.39 HISTORY OF OSTEOMYELITIS: ICD-10-CM

## 2024-04-16 DIAGNOSIS — M47.816 SPONDYLOSIS OF LUMBAR REGION WITHOUT MYELOPATHY OR RADICULOPATHY: ICD-10-CM

## 2024-04-16 DIAGNOSIS — Z98.890 S/P LAMINECTOMY: ICD-10-CM

## 2024-04-17 RX ORDER — PREGABALIN 200 MG/1
200 CAPSULE ORAL 3 TIMES DAILY
Qty: 90 CAPSULE | Refills: 0 | Status: SHIPPED | OUTPATIENT
Start: 2024-04-17

## 2024-04-17 RX ORDER — HYDROCODONE BITARTRATE AND ACETAMINOPHEN 7.5; 325 MG/1; MG/1
1 TABLET ORAL TAKE AS DIRECTED
Qty: 150 TABLET | Refills: 0 | Status: SHIPPED | OUTPATIENT
Start: 2024-04-17

## 2024-04-17 RX ORDER — CYCLOBENZAPRINE HCL 10 MG
10 TABLET ORAL 3 TIMES DAILY PRN
Qty: 90 TABLET | Refills: 0 | Status: SHIPPED | OUTPATIENT
Start: 2024-04-17

## 2024-05-13 DIAGNOSIS — Z98.890 S/P LAMINECTOMY: ICD-10-CM

## 2024-05-13 DIAGNOSIS — Z87.39 HISTORY OF OSTEOMYELITIS: ICD-10-CM

## 2024-05-13 DIAGNOSIS — G89.4 CHRONIC PAIN SYNDROME: ICD-10-CM

## 2024-05-13 RX ORDER — CYCLOBENZAPRINE HCL 10 MG
10 TABLET ORAL 3 TIMES DAILY PRN
Qty: 90 TABLET | Refills: 0 | Status: SHIPPED | OUTPATIENT
Start: 2024-05-13

## 2024-05-20 ENCOUNTER — OFFICE VISIT (OUTPATIENT)
Dept: PAIN MEDICINE | Facility: CLINIC | Age: 54
End: 2024-05-20
Payer: COMMERCIAL

## 2024-05-20 VITALS
BODY MASS INDEX: 21.86 KG/M2 | RESPIRATION RATE: 18 BRPM | HEART RATE: 82 BPM | SYSTOLIC BLOOD PRESSURE: 151 MMHG | HEIGHT: 68 IN | DIASTOLIC BLOOD PRESSURE: 85 MMHG | TEMPERATURE: 96.8 F | WEIGHT: 144.2 LBS

## 2024-05-20 DIAGNOSIS — Z98.890 S/P LAMINECTOMY: ICD-10-CM

## 2024-05-20 DIAGNOSIS — Z87.39 HISTORY OF OSTEOMYELITIS: ICD-10-CM

## 2024-05-20 DIAGNOSIS — M47.816 SPONDYLOSIS OF LUMBAR REGION WITHOUT MYELOPATHY OR RADICULOPATHY: ICD-10-CM

## 2024-05-20 DIAGNOSIS — G62.9 PERIPHERAL POLYNEUROPATHY: ICD-10-CM

## 2024-05-20 DIAGNOSIS — G89.4 CHRONIC PAIN SYNDROME: ICD-10-CM

## 2024-05-20 DIAGNOSIS — Z79.891 ENCOUNTER FOR LONG-TERM USE OF OPIATE ANALGESIC: Primary | ICD-10-CM

## 2024-05-20 DIAGNOSIS — Z79.899 ENCOUNTER FOR LONG-TERM (CURRENT) USE OF MEDICATIONS: ICD-10-CM

## 2024-05-20 PROCEDURE — 99214 OFFICE O/P EST MOD 30 MIN: CPT | Performed by: NURSE PRACTITIONER

## 2024-05-20 PROCEDURE — 1159F MED LIST DOCD IN RCRD: CPT | Performed by: NURSE PRACTITIONER

## 2024-05-20 PROCEDURE — 1160F RVW MEDS BY RX/DR IN RCRD: CPT | Performed by: NURSE PRACTITIONER

## 2024-05-20 PROCEDURE — 1125F AMNT PAIN NOTED PAIN PRSNT: CPT | Performed by: NURSE PRACTITIONER

## 2024-05-20 RX ORDER — MELOXICAM 15 MG/1
1 TABLET ORAL DAILY
COMMUNITY
Start: 2024-03-28

## 2024-05-20 RX ORDER — HYDROCODONE BITARTRATE AND ACETAMINOPHEN 7.5; 325 MG/1; MG/1
1 TABLET ORAL TAKE AS DIRECTED
Qty: 150 TABLET | Refills: 0 | Status: SHIPPED | OUTPATIENT
Start: 2024-05-20

## 2024-05-20 RX ORDER — NALOXONE HYDROCHLORIDE 4 MG/.1ML
1 SPRAY NASAL AS NEEDED
Qty: 2 EACH | Refills: 0 | Status: SHIPPED | OUTPATIENT
Start: 2024-05-20

## 2024-05-20 RX ORDER — PREGABALIN 200 MG/1
200 CAPSULE ORAL 3 TIMES DAILY
Qty: 90 CAPSULE | Refills: 0 | Status: SHIPPED | OUTPATIENT
Start: 2024-05-20

## 2024-05-20 NOTE — PROGRESS NOTES
CHIEF COMPLAINT  Follow-up for back pain.    Subjective   Ana Maria Story is a 53 y.o. female  who presents for follow-up.  She has a history of back pain.  Today her pain is 3/10VAS in severity. She is now utilizing Norco 7.5/325 5/day, Flexeril 10 mg 3/day, and Lyrica 200 mg 3/day. This medication regimen decreases her pain by a significant amount.  This regimen allows her to remain active and continue to care for her three grandchildren. ADLs by self.  She denies any side effects including somnolence or constipation. She does not take this medication prior to driving.     History of epidural abscesses and iliopsoas abscess diagnosed on 11/2/2021.  Patient underwent laminectomy of T6, T12, and L2 with evacuation of epidural abscess on 11/4/2021. Patient had bilateral transgluteal drainage of pelvic abscess on 11/5/2021.     She continues maintenance dose of oral antibiotics (Keflex 500 mg once daily). She is followed by ID yearly.      Previously did not tolerate baclofen (caused sleep walking), previously failed elavil for peripheral neuropathy.     Back Pain  This is a chronic problem. The current episode started more than 1 year ago. The problem occurs constantly. Progression since onset: improved since last office visit. The pain is present in the lumbar spine and thoracic spine. The quality of the pain is described as aching. The pain is at a severity of 3/10. The pain is moderate. The symptoms are aggravated by bending, position, standing and sitting (walking). Associated symptoms include numbness and weakness. Pertinent negatives include no abdominal pain, bladder incontinence, bowel incontinence, chest pain, dysuria, fever or headaches. She has tried analgesics and muscle relaxant (SI injection, norco, flexeril) for the symptoms. The treatment provided moderate relief.   Peripheral Neuropathy  This is a chronic problem. The current episode started more than 1 year ago. The problem occurs constantly. The  problem has been unchanged (3/10VAS). Associated symptoms include arthralgias, congestion (r/t allergies), fatigue, joint swelling, myalgias, neck pain, numbness, a sore throat and weakness. Pertinent negatives include no abdominal pain, chest pain, chills, coughing, diaphoresis, fever, headaches, nausea or vomiting. Nothing (cold weather) aggravates the symptoms. She has tried NSAIDs and oral narcotics (Norco 5/325, Lyrica 200 mg TID, Flexeril at HS, Elavil 150 mg HS) for the symptoms. The treatment provided moderate relief.      PEG Assessment   What number best describes your pain on average in the past week?5  What number best describes how, during the past week, pain has interfered with your enjoyment of life?5  What number best describes how, during the past week, pain has interfered with your general activity?  5    The following portions of the patient's history were reviewed and updated as appropriate: allergies, current medications, past family history, past medical history, past social history, past surgical history, and problem list.    Review of Systems   Constitutional:  Positive for fatigue. Negative for chills, diaphoresis and fever.   HENT:  Positive for congestion (r/t allergies) and sore throat.    Respiratory:  Negative for cough.    Cardiovascular:  Negative for chest pain.   Gastrointestinal:  Negative for abdominal pain, bowel incontinence, constipation, diarrhea, nausea and vomiting.   Genitourinary:  Positive for difficulty urinating. Negative for bladder incontinence and dysuria.   Musculoskeletal:  Positive for arthralgias, back pain, joint swelling, myalgias and neck pain.   Neurological:  Positive for weakness and numbness. Negative for headaches.   Psychiatric/Behavioral:  Positive for sleep disturbance. Negative for suicidal ideas. The patient is not nervous/anxious.      --  The aforementioned information the Chief Complaint section and above subjective data including any HPI data,  "and also the Review of Systems data, has been personally reviewed and affirmed.  --    Vitals:    05/20/24 0819   BP: 151/85   Pulse: 82   Resp: 18   Temp: 96.8 °F (36 °C)   Weight: 65.4 kg (144 lb 3.2 oz)   Height: 172.7 cm (68\")   PainSc:   3   PainLoc: Back     Objective   Physical Exam  Vitals and nursing note reviewed.   Constitutional:       Appearance: Normal appearance. She is well-developed.   Eyes:      General: Lids are normal.   Cardiovascular:      Rate and Rhythm: Normal rate.   Pulmonary:      Effort: Pulmonary effort is normal.   Musculoskeletal:      Lumbar back: Tenderness and bony tenderness present. Decreased range of motion.   Neurological:      Mental Status: She is alert and oriented to person, place, and time.   Psychiatric:         Attention and Perception: Attention normal.         Mood and Affect: Mood normal.         Speech: Speech normal.         Behavior: Behavior normal.         Judgment: Judgment normal.       Assessment & Plan   Diagnoses and all orders for this visit:    1. Encounter for long-term use of opiate analgesic (Primary)  -     naloxone (NARCAN) 4 MG/0.1ML nasal spray; 1 spray into the nostril(s) as directed by provider As Needed for Respiratory Depression or Opioid Reversal.  Dispense: 2 each; Refill: 0    2. S/P laminectomy  -     HYDROcodone-acetaminophen (NORCO) 7.5-325 MG per tablet; Take 1 tablet by mouth Take As Directed. Take every 4 to 6 hours PRN  MAX 5/day  Dispense: 150 tablet; Refill: 0    3. History of osteomyelitis  -     HYDROcodone-acetaminophen (NORCO) 7.5-325 MG per tablet; Take 1 tablet by mouth Take As Directed. Take every 4 to 6 hours PRN  MAX 5/day  Dispense: 150 tablet; Refill: 0    4. Spondylosis of lumbar region without myelopathy or radiculopathy  -     HYDROcodone-acetaminophen (NORCO) 7.5-325 MG per tablet; Take 1 tablet by mouth Take As Directed. Take every 4 to 6 hours PRN  MAX 5/day  Dispense: 150 tablet; Refill: 0    5. Chronic pain " syndrome  -     pregabalin (LYRICA) 200 MG capsule; Take 1 capsule by mouth 3 (Three) Times a Day.  Dispense: 90 capsule; Refill: 0    6. Peripheral polyneuropathy    7. Encounter for long-term (current) use of medications      Ana Maria Story reports a pain score of 3.  Given her pain assessment as noted, treatment options were discussed and the following options were decided upon as a follow-up plan to address the patient's pain: continuation of current treatment plan for pain.    --- The urine drug screen confirmation from 3/22/2024 has been reviewed and the result is appropriate based on patient history and POLLY report  --- CSA updated 9/22/2024  --- Opioid Risk--Low  --- Refill Norco 7.5/325. Fill date 5/21/2024 applied. Patient appears stable with current regimen. No adverse effects. Regarding continuation of opioids, there is no evidence of aberrant behavior or any red flags.  The patient continues with appropriate response to opioid therapy. ADL's remain intact by self.   --- Refill Lyrica.   --- Narcan prescribed.   --- Follow-up 2 months or sooner     POLLY REPORT  As part of the patient's treatment plan, I am prescribing controlled substances. The patient has been made aware of appropriate use of such medications, including potential risk of somnolence, limited ability to drive and/or work safely, and the potential for dependence or overdose. It has also been made clear that these medications are for use by this patient only, without concomitant use of alcohol or other substances unless prescribed.     Patient has completed prescribing agreement detailing terms of continued prescribing of controlled substances, including monitoring POLLY reports, urine drug screening, and pill counts if necessary. The patient is aware that inappropriate use will results in cessation of prescribing such medications.    As the clinician, I personally reviewed the POLLY from 5/20/2024 while the patient was in the  office today.    History and physical exam exhibit continued safe and appropriate use of controlled substances.      Dictated utilizing Dragon dictation.

## 2024-06-17 ENCOUNTER — TELEPHONE (OUTPATIENT)
Dept: PAIN MEDICINE | Facility: CLINIC | Age: 54
End: 2024-06-17
Payer: COMMERCIAL

## 2024-06-17 DIAGNOSIS — Z87.39 HISTORY OF OSTEOMYELITIS: ICD-10-CM

## 2024-06-17 DIAGNOSIS — Z98.890 S/P LAMINECTOMY: ICD-10-CM

## 2024-06-17 DIAGNOSIS — G89.4 CHRONIC PAIN SYNDROME: ICD-10-CM

## 2024-06-17 DIAGNOSIS — M47.816 SPONDYLOSIS OF LUMBAR REGION WITHOUT MYELOPATHY OR RADICULOPATHY: ICD-10-CM

## 2024-06-17 RX ORDER — HYDROCODONE BITARTRATE AND ACETAMINOPHEN 7.5; 325 MG/1; MG/1
1 TABLET ORAL TAKE AS DIRECTED
Qty: 150 TABLET | Refills: 0 | Status: SHIPPED | OUTPATIENT
Start: 2024-06-17

## 2024-06-17 RX ORDER — PREGABALIN 200 MG/1
200 CAPSULE ORAL 3 TIMES DAILY
Qty: 90 CAPSULE | Refills: 0 | Status: SHIPPED | OUTPATIENT
Start: 2024-06-17

## 2024-06-20 NOTE — TELEPHONE ENCOUNTER
Provider: SVITLANA    Caller: PATIENT    Pharm[facility]: [facility] [phone]    Phone Number: [phone]    Reason for Call: PATIENT STATES THAT [facility] NEEDS A PRIOR AUTH FOR HER PRESCRIPTION. SHE WILL BE OUT OF MEDICATION TODAY.

## 2024-07-09 DIAGNOSIS — Z98.890 S/P LAMINECTOMY: ICD-10-CM

## 2024-07-09 DIAGNOSIS — Z87.39 HISTORY OF OSTEOMYELITIS: ICD-10-CM

## 2024-07-09 DIAGNOSIS — G89.4 CHRONIC PAIN SYNDROME: ICD-10-CM

## 2024-07-09 RX ORDER — CYCLOBENZAPRINE HCL 10 MG
10 TABLET ORAL 3 TIMES DAILY PRN
Qty: 90 TABLET | Refills: 0 | Status: SHIPPED | OUTPATIENT
Start: 2024-07-09

## 2024-07-22 ENCOUNTER — OFFICE VISIT (OUTPATIENT)
Dept: PAIN MEDICINE | Facility: CLINIC | Age: 54
End: 2024-07-22
Payer: COMMERCIAL

## 2024-07-22 VITALS
BODY MASS INDEX: 22.04 KG/M2 | DIASTOLIC BLOOD PRESSURE: 87 MMHG | WEIGHT: 145.4 LBS | HEIGHT: 68 IN | SYSTOLIC BLOOD PRESSURE: 140 MMHG | TEMPERATURE: 97 F | OXYGEN SATURATION: 100 % | HEART RATE: 85 BPM

## 2024-07-22 DIAGNOSIS — Z87.39 HISTORY OF OSTEOMYELITIS: ICD-10-CM

## 2024-07-22 DIAGNOSIS — Z79.899 ENCOUNTER FOR LONG-TERM (CURRENT) USE OF MEDICATIONS: ICD-10-CM

## 2024-07-22 DIAGNOSIS — Z79.891 ENCOUNTER FOR LONG-TERM USE OF OPIATE ANALGESIC: ICD-10-CM

## 2024-07-22 DIAGNOSIS — Z98.890 S/P LAMINECTOMY: Primary | ICD-10-CM

## 2024-07-22 DIAGNOSIS — G62.9 PERIPHERAL POLYNEUROPATHY: ICD-10-CM

## 2024-07-22 DIAGNOSIS — G89.4 CHRONIC PAIN SYNDROME: ICD-10-CM

## 2024-07-22 DIAGNOSIS — M47.816 SPONDYLOSIS OF LUMBAR REGION WITHOUT MYELOPATHY OR RADICULOPATHY: ICD-10-CM

## 2024-07-22 PROCEDURE — 99214 OFFICE O/P EST MOD 30 MIN: CPT | Performed by: NURSE PRACTITIONER

## 2024-07-22 PROCEDURE — 1125F AMNT PAIN NOTED PAIN PRSNT: CPT | Performed by: NURSE PRACTITIONER

## 2024-07-22 PROCEDURE — 1160F RVW MEDS BY RX/DR IN RCRD: CPT | Performed by: NURSE PRACTITIONER

## 2024-07-22 PROCEDURE — 1159F MED LIST DOCD IN RCRD: CPT | Performed by: NURSE PRACTITIONER

## 2024-07-22 RX ORDER — HYDROCODONE BITARTRATE AND ACETAMINOPHEN 7.5; 325 MG/1; MG/1
1 TABLET ORAL TAKE AS DIRECTED
Qty: 150 TABLET | Refills: 0 | Status: SHIPPED | OUTPATIENT
Start: 2024-07-22

## 2024-07-22 RX ORDER — PREGABALIN 200 MG/1
200 CAPSULE ORAL 3 TIMES DAILY
Qty: 90 CAPSULE | Refills: 0 | Status: SHIPPED | OUTPATIENT
Start: 2024-07-22

## 2024-07-22 NOTE — PROGRESS NOTES
"CHIEF COMPLAINT  Back pain  Follow up    Subjective   Ana Maria Story is a 53 y.o. female  who presents for follow-up.  She has a history of back pain.  Today her pain is 3/10VAS in severity. She states that her pain is stable at this time. She states that her pain increases as the day progresses and with her activity level. She is now utilizing Norco 7.5/325 4-5/day, Flexeril 10 mg 3/day, and Lyrica 200 mg 3/day. This medication regimen decreases her pain by a significant amount. She denies any side effects including somnolence or constipation.     History of epidural abscesses and iliopsoas abscess diagnosed on 11/2/2021.  Patient underwent laminectomy of T6, T12, and L2 with evacuation of epidural abscess on 11/4/2021. Patient had bilateral transgluteal drainage of pelvic abscess on 11/5/2021.     She continues maintenance dose of oral antibiotics (Keflex 500 mg once daily). She is followed by ID yearly.     She denies any new leg weakness. She does note that at times her feet will \"fall asleep\".      Previously did not tolerate baclofen (caused sleep walking), previously failed elavil for peripheral neuropathy.     Back Pain  This is a chronic problem. The current episode started more than 1 year ago. The problem occurs constantly. The problem is unchanged (improved since last office visit). The pain is present in the lumbar spine and thoracic spine. The quality of the pain is described as aching. The pain is at a severity of 3/10. The pain is moderate. The symptoms are aggravated by bending, position, standing and sitting (walking). Associated symptoms include numbness and weakness. Pertinent negatives include no abdominal pain, bladder incontinence, bowel incontinence, chest pain, dysuria, fever or headaches. She has tried analgesics and muscle relaxant (SI injection, norco, flexeril) for the symptoms. The treatment provided moderate relief.   Peripheral Neuropathy  This is a chronic problem. The current " episode started more than 1 year ago. The problem occurs constantly. The problem has been waxing and waning (3/10VAS). Associated symptoms include arthralgias, congestion (r/t allergies), joint swelling, myalgias, neck pain, numbness, a sore throat and weakness. Pertinent negatives include no abdominal pain, chest pain, chills, coughing, diaphoresis, fatigue, fever, headaches, nausea or vomiting. Nothing (cold weather) aggravates the symptoms. She has tried NSAIDs and oral narcotics (Norco 5/325, Lyrica 200 mg TID, Flexeril at HS, Elavil 150 mg HS) for the symptoms. The treatment provided moderate relief.      PEG Assessment   What number best describes your pain on average in the past week?6  What number best describes how, during the past week, pain has interfered with your enjoyment of life?4  What number best describes how, during the past week, pain has interfered with your general activity?  4    The following portions of the patient's history were reviewed and updated as appropriate: allergies, current medications, past family history, past medical history, past social history, past surgical history, and problem list.    Review of Systems   Constitutional:  Negative for activity change, chills, diaphoresis, fatigue and fever.   HENT:  Positive for congestion (r/t allergies) and sore throat.    Respiratory:  Negative for cough.    Cardiovascular:  Negative for chest pain.   Gastrointestinal:  Positive for constipation and diarrhea. Negative for abdominal pain, bowel incontinence, nausea and vomiting.   Genitourinary:  Negative for bladder incontinence, difficulty urinating and dysuria.   Musculoskeletal:  Positive for arthralgias, back pain, joint swelling, myalgias and neck pain.   Neurological:  Positive for weakness and numbness. Negative for dizziness, light-headedness and headaches.   Psychiatric/Behavioral:  Positive for sleep disturbance. Negative for agitation and suicidal ideas. The patient is not  "nervous/anxious.      --  The aforementioned information the Chief Complaint section and above subjective data including any HPI data, and also the Review of Systems data, has been personally reviewed and affirmed.  --    Vitals:    07/22/24 0829   BP: 140/87   BP Location: Right arm   Patient Position: Sitting   Cuff Size: Adult   Pulse: 85   Temp: 97 °F (36.1 °C)   SpO2: 100%   Weight: 66 kg (145 lb 6.4 oz)   Height: 172.7 cm (68\")   PainSc:   3   PainLoc: Back     Objective   Physical Exam  Vitals and nursing note reviewed.   Constitutional:       Appearance: Normal appearance. She is well-developed.   Eyes:      General: Lids are normal.   Cardiovascular:      Rate and Rhythm: Normal rate.      Pulses:           Dorsalis pedis pulses are 2+ on the right side and 2+ on the left side.        Posterior tibial pulses are 2+ on the right side and 2+ on the left side.   Pulmonary:      Effort: Pulmonary effort is normal.   Musculoskeletal:      Lumbar back: Tenderness and bony tenderness present. Decreased range of motion.   Neurological:      Mental Status: She is alert and oriented to person, place, and time.   Psychiatric:         Attention and Perception: Attention normal.         Mood and Affect: Mood normal.         Speech: Speech normal.         Behavior: Behavior normal.         Judgment: Judgment normal.       Assessment & Plan   Diagnoses and all orders for this visit:    1. S/P laminectomy (Primary)  -     HYDROcodone-acetaminophen (NORCO) 7.5-325 MG per tablet; Take 1 tablet by mouth Take As Directed. Take every 4 to 6 hours PRN  MAX 5/day  Dispense: 150 tablet; Refill: 0    2. History of osteomyelitis  -     HYDROcodone-acetaminophen (NORCO) 7.5-325 MG per tablet; Take 1 tablet by mouth Take As Directed. Take every 4 to 6 hours PRN  MAX 5/day  Dispense: 150 tablet; Refill: 0    3. Chronic pain syndrome  -     pregabalin (LYRICA) 200 MG capsule; Take 1 capsule by mouth 3 (Three) Times a Day.  Dispense: 90 " capsule; Refill: 0    4. Spondylosis of lumbar region without myelopathy or radiculopathy  -     HYDROcodone-acetaminophen (NORCO) 7.5-325 MG per tablet; Take 1 tablet by mouth Take As Directed. Take every 4 to 6 hours PRN  MAX 5/day  Dispense: 150 tablet; Refill: 0    5. Peripheral polyneuropathy    6. Encounter for long-term use of opiate analgesic    7. Encounter for long-term (current) use of medications      Ana Maria Story reports a pain score of 3.  Given her pain assessment as noted, treatment options were discussed and the following options were decided upon as a follow-up plan to address the patient's pain: continuation of current treatment plan for pain.    --- The urine drug screen confirmation from 3/22/2024 has been reviewed and the result is appropriate based on patient history and POLLY report  --- CSA updated 9/22/2023  --- Opioid Risk--Low  --- Refill [place] 7.5/325. Patient appears stable with current regimen. No adverse effects. Regarding continuation of opioids, there is no evidence of aberrant behavior or any red flags.  The patient continues with appropriate response to opioid therapy. ADL's remain intact by self.   --- Refill Lyrica   --- Narcan prescription is current.   --- Follow-up 2 months or sooner if needed.      POLLY REPORT  As part of the patient's treatment plan, I am prescribing controlled substances. The patient has been made aware of appropriate use of such medications, including potential risk of somnolence, limited ability to drive and/or work safely, and the potential for dependence or overdose. It has also been made clear that these medications are for use by this patient only, without concomitant use of alcohol or other substances unless prescribed.     Patient has completed prescribing agreement detailing terms of continued prescribing of controlled substances, including monitoring POLLY reports, urine drug screening, and pill counts if necessary. The patient is aware  that inappropriate use will results in cessation of prescribing such medications.    As the clinician, I personally reviewed the POLLY from 3/22/2024 while the patient was in the office today.    History and physical exam exhibit continued safe and appropriate use of controlled substances.    Dictated utilizing Dragon dictation.

## 2024-08-08 DIAGNOSIS — Z87.39 HISTORY OF OSTEOMYELITIS: ICD-10-CM

## 2024-08-08 DIAGNOSIS — Z98.890 S/P LAMINECTOMY: ICD-10-CM

## 2024-08-08 DIAGNOSIS — G89.4 CHRONIC PAIN SYNDROME: ICD-10-CM

## 2024-08-09 RX ORDER — CYCLOBENZAPRINE HCL 10 MG
10 TABLET ORAL 3 TIMES DAILY PRN
Qty: 90 TABLET | Refills: 0 | Status: SHIPPED | OUTPATIENT
Start: 2024-08-09

## 2024-08-17 DIAGNOSIS — M47.816 SPONDYLOSIS OF LUMBAR REGION WITHOUT MYELOPATHY OR RADICULOPATHY: ICD-10-CM

## 2024-08-17 DIAGNOSIS — G89.4 CHRONIC PAIN SYNDROME: ICD-10-CM

## 2024-08-17 DIAGNOSIS — Z87.39 HISTORY OF OSTEOMYELITIS: ICD-10-CM

## 2024-08-17 DIAGNOSIS — Z98.890 S/P LAMINECTOMY: ICD-10-CM

## 2024-08-19 RX ORDER — PREGABALIN 200 MG/1
200 CAPSULE ORAL 3 TIMES DAILY
Qty: 90 CAPSULE | Refills: 0 | Status: SHIPPED | OUTPATIENT
Start: 2024-08-19

## 2024-08-19 RX ORDER — HYDROCODONE BITARTRATE AND ACETAMINOPHEN 7.5; 325 MG/1; MG/1
1 TABLET ORAL TAKE AS DIRECTED
Qty: 150 TABLET | Refills: 0 | Status: SHIPPED | OUTPATIENT
Start: 2024-08-19

## 2024-09-07 DIAGNOSIS — Z98.890 S/P LAMINECTOMY: ICD-10-CM

## 2024-09-07 DIAGNOSIS — Z87.39 HISTORY OF OSTEOMYELITIS: ICD-10-CM

## 2024-09-07 DIAGNOSIS — G89.4 CHRONIC PAIN SYNDROME: ICD-10-CM

## 2024-09-09 RX ORDER — CYCLOBENZAPRINE HCL 10 MG
10 TABLET ORAL 3 TIMES DAILY PRN
Qty: 90 TABLET | Refills: 0 | Status: SHIPPED | OUTPATIENT
Start: 2024-09-09

## 2024-09-23 ENCOUNTER — OFFICE VISIT (OUTPATIENT)
Dept: PAIN MEDICINE | Facility: CLINIC | Age: 54
End: 2024-09-23
Payer: COMMERCIAL

## 2024-09-23 VITALS
TEMPERATURE: 96 F | WEIGHT: 141.4 LBS | HEART RATE: 82 BPM | HEIGHT: 68 IN | SYSTOLIC BLOOD PRESSURE: 148 MMHG | OXYGEN SATURATION: 95 % | DIASTOLIC BLOOD PRESSURE: 88 MMHG | BODY MASS INDEX: 21.43 KG/M2

## 2024-09-23 DIAGNOSIS — Z87.39 HISTORY OF OSTEOMYELITIS: ICD-10-CM

## 2024-09-23 DIAGNOSIS — Z98.890 S/P LAMINECTOMY: Primary | ICD-10-CM

## 2024-09-23 DIAGNOSIS — G89.4 CHRONIC PAIN SYNDROME: ICD-10-CM

## 2024-09-23 DIAGNOSIS — G62.9 PERIPHERAL POLYNEUROPATHY: ICD-10-CM

## 2024-09-23 DIAGNOSIS — M47.816 SPONDYLOSIS OF LUMBAR REGION WITHOUT MYELOPATHY OR RADICULOPATHY: ICD-10-CM

## 2024-09-23 DIAGNOSIS — Z79.891 ENCOUNTER FOR LONG-TERM USE OF OPIATE ANALGESIC: ICD-10-CM

## 2024-09-23 PROCEDURE — 99214 OFFICE O/P EST MOD 30 MIN: CPT | Performed by: NURSE PRACTITIONER

## 2024-09-23 PROCEDURE — 80305 DRUG TEST PRSMV DIR OPT OBS: CPT | Performed by: NURSE PRACTITIONER

## 2024-09-23 PROCEDURE — 1125F AMNT PAIN NOTED PAIN PRSNT: CPT | Performed by: NURSE PRACTITIONER

## 2024-09-23 PROCEDURE — 1160F RVW MEDS BY RX/DR IN RCRD: CPT | Performed by: NURSE PRACTITIONER

## 2024-09-23 PROCEDURE — 1159F MED LIST DOCD IN RCRD: CPT | Performed by: NURSE PRACTITIONER

## 2024-09-23 RX ORDER — PREGABALIN 200 MG/1
200 CAPSULE ORAL 3 TIMES DAILY
Qty: 90 CAPSULE | Refills: 1 | Status: SHIPPED | OUTPATIENT
Start: 2024-09-23

## 2024-09-23 RX ORDER — HYDROCODONE BITARTRATE AND ACETAMINOPHEN 7.5; 325 MG/1; MG/1
1 TABLET ORAL TAKE AS DIRECTED
Qty: 150 TABLET | Refills: 0 | Status: SHIPPED | OUTPATIENT
Start: 2024-09-23

## 2024-10-04 DIAGNOSIS — Z87.39 HISTORY OF OSTEOMYELITIS: ICD-10-CM

## 2024-10-04 DIAGNOSIS — Z98.890 S/P LAMINECTOMY: ICD-10-CM

## 2024-10-04 DIAGNOSIS — G89.4 CHRONIC PAIN SYNDROME: ICD-10-CM

## 2024-10-04 RX ORDER — CYCLOBENZAPRINE HCL 10 MG
10 TABLET ORAL 3 TIMES DAILY PRN
Qty: 90 TABLET | Refills: 0 | Status: SHIPPED | OUTPATIENT
Start: 2024-10-04

## 2024-10-16 ENCOUNTER — TRANSCRIBE ORDERS (OUTPATIENT)
Dept: ADMINISTRATIVE | Facility: HOSPITAL | Age: 54
End: 2024-10-16
Payer: COMMERCIAL

## 2024-10-16 DIAGNOSIS — Z13.9 SCREENING DUE: Primary | ICD-10-CM

## 2024-10-17 ENCOUNTER — TRANSCRIBE ORDERS (OUTPATIENT)
Dept: ADMINISTRATIVE | Facility: HOSPITAL | Age: 54
End: 2024-10-17
Payer: COMMERCIAL

## 2024-10-17 DIAGNOSIS — Z12.31 OTHER SCREENING MAMMOGRAM: Primary | ICD-10-CM

## 2024-10-21 DIAGNOSIS — Z98.890 S/P LAMINECTOMY: ICD-10-CM

## 2024-10-21 DIAGNOSIS — G89.4 CHRONIC PAIN SYNDROME: ICD-10-CM

## 2024-10-21 DIAGNOSIS — Z87.39 HISTORY OF OSTEOMYELITIS: ICD-10-CM

## 2024-10-21 DIAGNOSIS — M47.816 SPONDYLOSIS OF LUMBAR REGION WITHOUT MYELOPATHY OR RADICULOPATHY: ICD-10-CM

## 2024-10-22 RX ORDER — HYDROCODONE BITARTRATE AND ACETAMINOPHEN 7.5; 325 MG/1; MG/1
1 TABLET ORAL TAKE AS DIRECTED
Qty: 150 TABLET | Refills: 0 | Status: SHIPPED | OUTPATIENT
Start: 2024-10-22

## 2024-10-22 RX ORDER — PREGABALIN 200 MG/1
200 CAPSULE ORAL 3 TIMES DAILY
Qty: 90 CAPSULE | Refills: 1 | Status: SHIPPED | OUTPATIENT
Start: 2024-10-22

## 2024-10-23 ENCOUNTER — HOSPITAL ENCOUNTER (OUTPATIENT)
Dept: CT IMAGING | Facility: HOSPITAL | Age: 54
Discharge: HOME | End: 2024-10-23
Admitting: NURSE PRACTITIONER
Payer: COMMERCIAL

## 2024-10-23 DIAGNOSIS — Z13.9 SCREENING DUE: ICD-10-CM

## 2024-10-23 PROCEDURE — 71271 CT THORAX LUNG CANCER SCR C-: CPT

## 2024-11-03 DIAGNOSIS — Z98.890 S/P LAMINECTOMY: ICD-10-CM

## 2024-11-03 DIAGNOSIS — Z87.39 HISTORY OF OSTEOMYELITIS: ICD-10-CM

## 2024-11-03 DIAGNOSIS — G89.4 CHRONIC PAIN SYNDROME: ICD-10-CM

## 2024-11-04 RX ORDER — CYCLOBENZAPRINE HCL 10 MG
10 TABLET ORAL 3 TIMES DAILY PRN
Qty: 90 TABLET | Refills: 0 | Status: SHIPPED | OUTPATIENT
Start: 2024-11-04

## 2024-11-20 ENCOUNTER — OFFICE VISIT (OUTPATIENT)
Dept: PAIN MEDICINE | Facility: CLINIC | Age: 54
End: 2024-11-20
Payer: COMMERCIAL

## 2024-11-20 VITALS
HEIGHT: 68 IN | OXYGEN SATURATION: 98 % | TEMPERATURE: 97.5 F | RESPIRATION RATE: 18 BRPM | BODY MASS INDEX: 21.82 KG/M2 | WEIGHT: 144 LBS | HEART RATE: 80 BPM | SYSTOLIC BLOOD PRESSURE: 149 MMHG | DIASTOLIC BLOOD PRESSURE: 78 MMHG

## 2024-11-20 DIAGNOSIS — G89.4 CHRONIC PAIN SYNDROME: ICD-10-CM

## 2024-11-20 DIAGNOSIS — M47.816 SPONDYLOSIS OF LUMBAR REGION WITHOUT MYELOPATHY OR RADICULOPATHY: ICD-10-CM

## 2024-11-20 DIAGNOSIS — Z87.39 HISTORY OF OSTEOMYELITIS: ICD-10-CM

## 2024-11-20 DIAGNOSIS — Z98.890 S/P LAMINECTOMY: ICD-10-CM

## 2024-11-20 RX ORDER — CYCLOBENZAPRINE HCL 10 MG
10 TABLET ORAL 3 TIMES DAILY PRN
Qty: 90 TABLET | Refills: 0 | Status: SHIPPED | OUTPATIENT
Start: 2024-11-20

## 2024-11-20 RX ORDER — HYDROCODONE BITARTRATE AND ACETAMINOPHEN 7.5; 325 MG/1; MG/1
1 TABLET ORAL TAKE AS DIRECTED
Qty: 150 TABLET | Refills: 0 | Status: SHIPPED | OUTPATIENT
Start: 2024-11-20

## 2024-11-20 RX ORDER — ERGOCALCIFEROL 1.25 MG/1
50000 CAPSULE, LIQUID FILLED ORAL
COMMUNITY
Start: 2024-10-15

## 2024-11-20 NOTE — PROGRESS NOTES
CHIEF COMPLAINT  Follow-up for back pain.    Subjective   Ana Maria Story is a 54 y.o. female  who presents for follow-up.  She has a history of back pain and neuropathy pain.  She states that her back pain has improved slightly since her last office visit.  She states that PT has been helpful.      She continues to utilize Norco 7.5/325 4-5/day, Flexeril 10 mg 3/day, and Lyrica 200 mg 3/day. This medication regimen decreases her pain by 50% and allows her to increase her activity and meet her functional ADLs. She does report constipation, she states that this is managed. She denies any side effects including somnolence.     History of epidural abscesses and iliopsoas abscess diagnosed on 11/2/2021.  Patient underwent laminectomy of T6, T12, and L2 with evacuation of epidural abscess on 11/4/2021. Patient had bilateral transgluteal drainage of pelvic abscess on 11/5/2021.     She continues maintenance dose of oral antibiotics (Keflex 500 mg once daily). She is followed by ID yearly.      Previously did not tolerate baclofen (caused sleep walking), previously failed elavil for peripheral neuropathy.     Back Pain  This is a chronic problem. The current episode started more than 1 year ago. The problem occurs constantly. The problem is unchanged. The pain is present in the lumbar spine and thoracic spine. The quality of the pain is described as aching. The pain is at a severity of 4/10. The pain is moderate. The symptoms are aggravated by bending, position, standing and sitting (walking). Associated symptoms include numbness (bilateral leg, bilateral foot, bilateral hand/arm, areas in back) and weakness (generalized). Pertinent negatives include no abdominal pain, bladder incontinence, bowel incontinence, chest pain, dysuria, fever or headaches. She has tried analgesics and muscle relaxant (SI injection, norco, flexeril) for the symptoms. The treatment provided moderate relief.   Peripheral Neuropathy  This is a  chronic problem. The current episode started more than 1 year ago. The problem occurs constantly. The problem has been waxing and waning (3/10VAS). Associated symptoms include arthralgias, congestion (r/t allergies), joint swelling, myalgias, neck pain, numbness (bilateral leg, bilateral foot, bilateral hand/arm, areas in back), a sore throat and weakness (generalized). Pertinent negatives include no abdominal pain, chest pain, chills, coughing, diaphoresis, fatigue, fever, headaches, nausea or vomiting. Nothing (cold weather) aggravates the symptoms. She has tried NSAIDs and oral narcotics (Norco 7.5/325, Lyrica 200 mg TID, Flexeril at HS, Elavil 150 mg HS) for the symptoms. The treatment provided moderate relief.     PEG Assessment   What number best describes your pain on average in the past week?3  What number best describes how, during the past week, pain has interfered with your enjoyment of life?3  What number best describes how, during the past week, pain has interfered with your general activity?  3    The following portions of the patient's history were reviewed and updated as appropriate: allergies, current medications, past family history, past medical history, past social history, past surgical history, and problem list.    Review of Systems   Constitutional:  Negative for chills, diaphoresis, fatigue and fever.   HENT:  Positive for congestion (r/t allergies) and sore throat.    Respiratory:  Negative for cough.    Cardiovascular:  Negative for chest pain.   Gastrointestinal:  Positive for constipation and diarrhea. Negative for abdominal pain, nausea and vomiting.   Genitourinary:  Negative for difficulty urinating and dysuria.   Musculoskeletal:  Positive for back pain, myalgias and neck pain.   Neurological:  Positive for weakness (generalized) and numbness (bilateral leg, bilateral foot, bilateral hand/arm, areas in back). Negative for headaches.   Psychiatric/Behavioral:  Positive for sleep  "disturbance. Negative for suicidal ideas. The patient is not nervous/anxious.      --  The aforementioned information the Chief Complaint section and above subjective data including any HPI data, and also the Review of Systems data, has been personally reviewed and affirmed.  --    Vitals:    11/20/24 0901   BP: 149/78   Pulse: 80   Resp: 18   Temp: 97.5 °F (36.4 °C)   SpO2: 98%   Weight: 65.3 kg (144 lb)   Height: 172.7 cm (68\")   PainSc:   4   PainLoc: Back     Objective   Physical Exam  Vitals and nursing note reviewed.   Constitutional:       Appearance: Normal appearance. She is well-developed.   Eyes:      General: Lids are normal.   Cardiovascular:      Rate and Rhythm: Normal rate.   Pulmonary:      Effort: Pulmonary effort is normal.   Musculoskeletal:      Lumbar back: Tenderness and bony tenderness present. Decreased range of motion.   Neurological:      Mental Status: She is alert and oriented to person, place, and time.      Motor: No weakness.   Psychiatric:         Attention and Perception: Attention normal.         Mood and Affect: Mood normal.         Speech: Speech normal.         Behavior: Behavior normal.         Judgment: Judgment normal.         Assessment & Plan   Diagnoses and all orders for this visit:    1. S/P laminectomy  -     HYDROcodone-acetaminophen (NORCO) 7.5-325 MG per tablet; Take 1 tablet by mouth Take As Directed. Take every 4 to 6 hours PRN  MAX 5/day  Dispense: 150 tablet; Refill: 0  -     cyclobenzaprine (FLEXERIL) 10 MG tablet; Take 1 tablet by mouth 3 (Three) Times a Day As Needed for Muscle Spasms. for muscle spams  Dispense: 90 tablet; Refill: 0    2. History of osteomyelitis  -     HYDROcodone-acetaminophen (NORCO) 7.5-325 MG per tablet; Take 1 tablet by mouth Take As Directed. Take every 4 to 6 hours PRN  MAX 5/day  Dispense: 150 tablet; Refill: 0  -     cyclobenzaprine (FLEXERIL) 10 MG tablet; Take 1 tablet by mouth 3 (Three) Times a Day As Needed for Muscle Spasms. " for muscle spams  Dispense: 90 tablet; Refill: 0    3. Spondylosis of lumbar region without myelopathy or radiculopathy  -     HYDROcodone-acetaminophen (NORCO) 7.5-325 MG per tablet; Take 1 tablet by mouth Take As Directed. Take every 4 to 6 hours PRN  MAX 5/day  Dispense: 150 tablet; Refill: 0    4. Chronic pain syndrome  -     cyclobenzaprine (FLEXERIL) 10 MG tablet; Take 1 tablet by mouth 3 (Three) Times a Day As Needed for Muscle Spasms. for muscle spams  Dispense: 90 tablet; Refill: 0      Ana Maria Story reports a pain score of 4.  Given her pain assessment as noted, treatment options were discussed and the following options were decided upon as a follow-up plan to address the patient's pain: continuation of current treatment plan for pain.    --- The urine drug screen confirmation from 9/23/2024 has been reviewed and the result is appropriate based on patient history and POLLY report  --- CSA updated 9/23/2024  --- Opioid Risk--Low  --- Refill Norco 7.5/325. Fill date 11/22/2024 applied. Patient appears stable with current regimen. No adverse effects. Regarding continuation of opioids, there is no evidence of aberrant behavior or any red flags.  The patient continues with appropriate response to opioid therapy. ADL's remain intact by self.   --- Continue Lyrica and Flexeril  --- Follow-up 2 months or sooner if needed.      POLLY REPORT  As part of the patient's treatment plan, I am prescribing controlled substances. The patient has been made aware of appropriate use of such medications, including potential risk of somnolence, limited ability to drive and/or work safely, and the potential for dependence or overdose. It has also been made clear that these medications are for use by this patient only, without concomitant use of alcohol or other substances unless prescribed.     Patient has completed prescribing agreement detailing terms of continued prescribing of controlled substances, including monitoring  POLLY reports, urine drug screening, and pill counts if necessary. The patient is aware that inappropriate use will results in cessation of prescribing such medications.    As the clinician, I personally reviewed the POLLY from 11/20/2024 while the patient was in the office today.    History and physical exam exhibit continued safe and appropriate use of controlled substances.    Dictated utilizing Dragon dictation.

## 2024-12-19 DIAGNOSIS — M47.816 SPONDYLOSIS OF LUMBAR REGION WITHOUT MYELOPATHY OR RADICULOPATHY: ICD-10-CM

## 2024-12-19 DIAGNOSIS — G89.4 CHRONIC PAIN SYNDROME: ICD-10-CM

## 2024-12-19 DIAGNOSIS — Z98.890 S/P LAMINECTOMY: ICD-10-CM

## 2024-12-19 DIAGNOSIS — Z87.39 HISTORY OF OSTEOMYELITIS: ICD-10-CM

## 2024-12-20 RX ORDER — PREGABALIN 200 MG/1
200 CAPSULE ORAL 3 TIMES DAILY
Qty: 90 CAPSULE | Refills: 1 | Status: SHIPPED | OUTPATIENT
Start: 2024-12-20

## 2024-12-20 RX ORDER — HYDROCODONE BITARTRATE AND ACETAMINOPHEN 7.5; 325 MG/1; MG/1
1 TABLET ORAL TAKE AS DIRECTED
Qty: 150 TABLET | Refills: 0 | Status: SHIPPED | OUTPATIENT
Start: 2024-12-20

## 2025-01-20 ENCOUNTER — OFFICE VISIT (OUTPATIENT)
Dept: PAIN MEDICINE | Facility: CLINIC | Age: 55
End: 2025-01-20
Payer: COMMERCIAL

## 2025-01-20 VITALS
OXYGEN SATURATION: 97 % | SYSTOLIC BLOOD PRESSURE: 160 MMHG | TEMPERATURE: 96.3 F | BODY MASS INDEX: 21.52 KG/M2 | HEIGHT: 68 IN | WEIGHT: 142 LBS | RESPIRATION RATE: 20 BRPM | DIASTOLIC BLOOD PRESSURE: 93 MMHG | HEART RATE: 86 BPM

## 2025-01-20 DIAGNOSIS — Z98.890 S/P LAMINECTOMY: ICD-10-CM

## 2025-01-20 DIAGNOSIS — M47.816 SPONDYLOSIS OF LUMBAR REGION WITHOUT MYELOPATHY OR RADICULOPATHY: ICD-10-CM

## 2025-01-20 DIAGNOSIS — G62.9 PERIPHERAL POLYNEUROPATHY: ICD-10-CM

## 2025-01-20 DIAGNOSIS — G89.4 CHRONIC PAIN SYNDROME: ICD-10-CM

## 2025-01-20 DIAGNOSIS — Z87.39 HISTORY OF OSTEOMYELITIS: ICD-10-CM

## 2025-01-20 DIAGNOSIS — Z79.891 ENCOUNTER FOR LONG-TERM USE OF OPIATE ANALGESIC: Primary | ICD-10-CM

## 2025-01-20 PROCEDURE — 1125F AMNT PAIN NOTED PAIN PRSNT: CPT | Performed by: NURSE PRACTITIONER

## 2025-01-20 PROCEDURE — 1160F RVW MEDS BY RX/DR IN RCRD: CPT | Performed by: NURSE PRACTITIONER

## 2025-01-20 PROCEDURE — 1159F MED LIST DOCD IN RCRD: CPT | Performed by: NURSE PRACTITIONER

## 2025-01-20 PROCEDURE — 99214 OFFICE O/P EST MOD 30 MIN: CPT | Performed by: NURSE PRACTITIONER

## 2025-01-20 RX ORDER — HYDROCODONE BITARTRATE AND ACETAMINOPHEN 7.5; 325 MG/1; MG/1
1 TABLET ORAL TAKE AS DIRECTED
Qty: 150 TABLET | Refills: 0 | Status: SHIPPED | OUTPATIENT
Start: 2025-01-20

## 2025-01-20 RX ORDER — CYCLOBENZAPRINE HCL 10 MG
10 TABLET ORAL 3 TIMES DAILY PRN
Qty: 90 TABLET | Refills: 0 | Status: SHIPPED | OUTPATIENT
Start: 2025-01-20

## 2025-01-20 NOTE — PROGRESS NOTES
CHIEF COMPLAINT  Back pain  Pain fluctuates states PT has helped some and continues with exercises.    Subjective   Ana Maria Story is a 54 y.o. female  who presents for follow-up.  She has a history of back pain and pain d/t peripheral neuropathy.  Today her pain is 4/10VAS in severity. She states that her back pain has been fluctuating in severity, but has been better this winter then it normally is. She states that insurance will not cover her PT anymore. She does continue to perform her PT exercises which has helped some.    She continues to utilize Norco 7.5/325 4-5/day, Flexeril 10 mg 2-3/day, and Lyrica 200 mg 3/day.  This medication regimen decreases her pain by 50%. This allows her to continue to care for her three grandchildren full time. She denies any side effects including somnolence or constipation. ADLs by self. Of note: She does not drive if she has taken her hydrocodone.     History of epidural abscesses and iliopsoas abscess diagnosed on 11/2/2021.  Patient underwent laminectomy of T6, T12, and L2 with evacuation of epidural abscess on 11/4/2021. Patient had bilateral transgluteal drainage of pelvic abscess on 11/5/2021.     She continues maintenance dose of oral antibiotics (Keflex 500 mg once daily). She is followed by ID yearly.      Previously did not tolerate baclofen (caused sleep walking), previously failed elavil for peripheral neuropathy.     Back Pain  This is a chronic problem. The current episode started more than 1 year ago. The problem occurs constantly. The problem is unchanged (fluctuates in severity). The pain is present in the lumbar spine and thoracic spine. The quality of the pain is described as aching. The pain is at a severity of 4/10. The pain is moderate. The symptoms are aggravated by bending, position, standing and sitting (walking). Associated symptoms include numbness and weakness. Pertinent negatives include no abdominal pain, bladder incontinence, bowel  incontinence, chest pain, dysuria, fever or headaches. She has tried analgesics and muscle relaxant (SI injection, norco, flexeril) for the symptoms. The treatment provided moderate relief.   Peripheral Neuropathy  Symptoms are chronic.   Onset was more than 1 year ago.   Symptoms occur constantly.   Progression since onset: 4/10VAS in severity, fluctuates in severity.   Symptoms include congestion, cough, myalgias, neck pain, numbness, sore throat and weakness.    Pertinent negative symptoms include no abdominal pain, no chest pain, no chills, no diaphoresis, no fatigue, no fever, no headaches, no nausea, no dysuria and no vomiting.   Aggravating factors include nothing (cold weather).   Treatments tried include NSAIDs and oral narcotics (Norco 5/325, Lyrica 200 mg TID, Flexeril at HS, Elavil 150 mg HS).   Improvement on treatment was moderate.      PEG Assessment   What number best describes your pain on average in the past week?5  What number best describes how, during the past week, pain has interfered with your enjoyment of life?0  What number best describes how, during the past week, pain has interfered with your general activity?  2    The following portions of the patient's history were reviewed and updated as appropriate: allergies, current medications, past family history, past medical history, past social history, past surgical history, and problem list.    Review of Systems   Constitutional:  Negative for activity change, chills, diaphoresis, fatigue and fever.   HENT:  Positive for congestion and sore throat.    Respiratory:  Positive for cough.    Cardiovascular:  Negative for chest pain.   Gastrointestinal:  Positive for constipation and diarrhea. Negative for abdominal pain, bowel incontinence, nausea and vomiting.   Genitourinary:  Negative for bladder incontinence and dysuria.   Musculoskeletal:  Positive for back pain, myalgias and neck pain.   Neurological:  Positive for weakness and numbness.  "Negative for dizziness, light-headedness and headaches.   Psychiatric/Behavioral:  Positive for sleep disturbance. Negative for agitation and suicidal ideas. The patient is not nervous/anxious.      --  The aforementioned information the Chief Complaint section and above subjective data including any HPI data, and also the Review of Systems data, has been personally reviewed and affirmed.  --    Vitals:    01/20/25 0922   BP: 160/93   BP Location: Left arm   Patient Position: Sitting   Cuff Size: Adult   Pulse: 86   Resp: 20   Temp: 96.3 °F (35.7 °C)   TempSrc: Temporal   SpO2: 97%   Weight: 64.4 kg (142 lb)   Height: 172.7 cm (68\")   PainSc:   4     Objective   Physical Exam  Vitals and nursing note reviewed.   Constitutional:       Appearance: Normal appearance. She is well-developed.   Eyes:      General: Lids are normal.   Cardiovascular:      Rate and Rhythm: Normal rate.   Pulmonary:      Effort: Pulmonary effort is normal.   Musculoskeletal:      Lumbar back: Tenderness and bony tenderness present. Decreased range of motion.   Neurological:      Mental Status: She is alert and oriented to person, place, and time.   Psychiatric:         Attention and Perception: Attention normal.         Mood and Affect: Mood normal.         Speech: Speech normal.         Behavior: Behavior normal.         Judgment: Judgment normal.     Assessment & Plan   Diagnoses and all orders for this visit:    1. Encounter for long-term use of opiate analgesic (Primary)    2. S/P laminectomy    3. History of osteomyelitis    4. Spondylosis of lumbar region without myelopathy or radiculopathy    5. Chronic pain syndrome    6. Peripheral polyneuropathy      Ana Maria Story reports a pain score of 4.  Given her pain assessment as noted, treatment options were discussed and the following options were decided upon as a follow-up plan to address the patient's pain: continuation of current treatment plan for pain.    --- The urine drug screen " confirmation from 9/23/2024 has been reviewed and the result is appropriate based on patient history and POLLY report  --- CSA updated 9/23/2024  --- Opioid Risk--Low  --- Refill Norco 7.5/325. Fill date 1/21/2025 applied. Patient appears stable with current regimen. No adverse effects. Regarding continuation of opioids, there is no evidence of aberrant behavior or any red flags.  The patient continues with appropriate response to opioid therapy. ADL's remain intact by self.   --- Refill Flexeril 10 mg   --- Follow-up 2 months or sooner if needed.      POLLY REPORT  As part of the patient's treatment plan, I am prescribing controlled substances. The patient has been made aware of appropriate use of such medications, including potential risk of somnolence, limited ability to drive and/or work safely, and the potential for dependence or overdose. It has also been made clear that these medications are for use by this patient only, without concomitant use of alcohol or other substances unless prescribed.     Patient has completed prescribing agreement detailing terms of continued prescribing of controlled substances, including monitoring POLLY reports, urine drug screening, and pill counts if necessary. The patient is aware that inappropriate use will results in cessation of prescribing such medications.    As the clinician, I personally reviewed the POLLY from 1/20/2025 while the patient was in the office today.    History and physical exam exhibit continued safe and appropriate use of controlled substances.    Dictated utilizing Dragon dictation.

## 2025-01-21 ENCOUNTER — PRIOR AUTHORIZATION (OUTPATIENT)
Dept: PAIN MEDICINE | Facility: CLINIC | Age: 55
End: 2025-01-21
Payer: COMMERCIAL

## 2025-01-21 NOTE — TELEPHONE ENCOUNTER
Norco 7.5-325 mg approved    Outcome  Approved today by [place] [employer] 2017  The request has been approved. The authorization is effective from 01/21/2025 to 07/21/2025, as long as the member is enrolled in their current health plan. A written notification letter will follow with additional details.  Authorization Expiration Date: 7/20/2025    Pt informed.

## 2025-02-17 DIAGNOSIS — M47.816 SPONDYLOSIS OF LUMBAR REGION WITHOUT MYELOPATHY OR RADICULOPATHY: ICD-10-CM

## 2025-02-17 DIAGNOSIS — G89.4 CHRONIC PAIN SYNDROME: ICD-10-CM

## 2025-02-17 DIAGNOSIS — Z98.890 S/P LAMINECTOMY: ICD-10-CM

## 2025-02-17 DIAGNOSIS — Z87.39 HISTORY OF OSTEOMYELITIS: ICD-10-CM

## 2025-02-17 RX ORDER — CYCLOBENZAPRINE HCL 10 MG
10 TABLET ORAL 3 TIMES DAILY PRN
Qty: 90 TABLET | Refills: 0 | Status: SHIPPED | OUTPATIENT
Start: 2025-02-17

## 2025-02-17 RX ORDER — PREGABALIN 200 MG/1
200 CAPSULE ORAL 3 TIMES DAILY
Qty: 90 CAPSULE | Refills: 1 | Status: SHIPPED | OUTPATIENT
Start: 2025-02-17

## 2025-02-17 RX ORDER — HYDROCODONE BITARTRATE AND ACETAMINOPHEN 7.5; 325 MG/1; MG/1
1 TABLET ORAL TAKE AS DIRECTED
Qty: 150 TABLET | Refills: 0 | Status: SHIPPED | OUTPATIENT
Start: 2025-02-17

## 2025-03-19 ENCOUNTER — OFFICE VISIT (OUTPATIENT)
Dept: PAIN MEDICINE | Facility: CLINIC | Age: 55
End: 2025-03-19
Payer: COMMERCIAL

## 2025-03-19 VITALS
SYSTOLIC BLOOD PRESSURE: 159 MMHG | DIASTOLIC BLOOD PRESSURE: 90 MMHG | BODY MASS INDEX: 21.79 KG/M2 | WEIGHT: 143.8 LBS | TEMPERATURE: 97.7 F | HEART RATE: 87 BPM | HEIGHT: 68 IN

## 2025-03-19 DIAGNOSIS — Z79.891 ENCOUNTER FOR LONG-TERM USE OF OPIATE ANALGESIC: Primary | ICD-10-CM

## 2025-03-19 DIAGNOSIS — M47.816 SPONDYLOSIS OF LUMBAR REGION WITHOUT MYELOPATHY OR RADICULOPATHY: ICD-10-CM

## 2025-03-19 DIAGNOSIS — G62.9 PERIPHERAL POLYNEUROPATHY: ICD-10-CM

## 2025-03-19 DIAGNOSIS — G89.4 CHRONIC PAIN SYNDROME: ICD-10-CM

## 2025-03-19 DIAGNOSIS — Z98.890 S/P LAMINECTOMY: ICD-10-CM

## 2025-03-19 DIAGNOSIS — Z87.39 HISTORY OF OSTEOMYELITIS: ICD-10-CM

## 2025-03-19 LAB
POC AMPHETAMINES: NEGATIVE
POC BARBITURATES: NEGATIVE
POC BENZODIAZEPHINES: NEGATIVE
POC BUPRENORPHINE: NEGATIVE
POC COCAINE: NEGATIVE
POC METHADONE: NEGATIVE
POC METHAMPHETAMINE SCREEN URINE: NEGATIVE
POC OPIATES: POSITIVE
POC OXYCODONE: NEGATIVE
POC PHENCYCLIDINE: NEGATIVE
POC PROPOXYPHENE: NEGATIVE
POC THC: NEGATIVE
POC TRICYCLIC ANTIDEPRESSANTS: NEGATIVE

## 2025-03-19 RX ORDER — CYCLOBENZAPRINE HCL 10 MG
10 TABLET ORAL 3 TIMES DAILY PRN
Qty: 90 TABLET | Refills: 0 | Status: SHIPPED | OUTPATIENT
Start: 2025-03-19

## 2025-03-19 RX ORDER — HYDROCODONE BITARTRATE AND ACETAMINOPHEN 7.5; 325 MG/1; MG/1
1 TABLET ORAL TAKE AS DIRECTED
Qty: 150 TABLET | Refills: 0 | Status: SHIPPED | OUTPATIENT
Start: 2025-03-19

## 2025-03-19 NOTE — PROGRESS NOTES
CHIEF COMPLAINT  F/u back pain- patient states that her pain has remained the same since her last visit.     Subjective   Ana Maria Story is a 54 y.o. female  who presents for follow-up.  She has a history of back pain. Today her pain is 3/10VAS in severity. She states that her pain is unchanged since her last office visit. She notes that her pain is good in the morning, but does progress as the day goes on. She states that she has had no episodes of significant flares in pain. She has been more cautious regarding picking her grandchildren up when has been helpful.     She continues to utilize Norco 7.5/325 5/day, Flexeril 10 mg 3/day, and Lyrica 200 mg 3/day. This medication regimen decreases her pain to the point that she can meet her functional ADLs and and allows her to care for her 3 grandchildren for which she has custody. She denies any side effects including somnolence or constipation. She does not drive when she has taken her medication.     She denies any saddle anesthesia, she does have urinary urgency. She denies any B/B incontinence.       History of epidural abscesses and iliopsoas abscess diagnosed on 11/2/2021.  Patient underwent laminectomy of T6, T12, and L2 with evacuation of epidural abscess on 11/4/2021. Patient had bilateral transgluteal drainage of pelvic abscess on 11/5/2021.     She continues maintenance dose of oral antibiotics (Keflex 500 mg once daily). She is followed by ID yearly.      Previously did not tolerate baclofen (caused sleep walking), previously failed elavil for peripheral neuropathy.    Back Pain  This is a chronic problem. The current episode started more than 1 year ago. The problem occurs constantly. The problem is unchanged (fluctuates in severity). The pain is present in the lumbar spine and thoracic spine. The quality of the pain is described as aching. The pain is at a severity of 3/10. The pain is moderate. The symptoms are aggravated by bending, position,  standing and sitting (walking). Associated symptoms include numbness and weakness. Pertinent negatives include no abdominal pain, bladder incontinence, bowel incontinence, chest pain, dysuria, fever or headaches. She has tried analgesics and muscle relaxant (SI injection, norco, flexeril) for the symptoms. The treatment provided moderate relief.   Peripheral Neuropathy  Symptoms are chronic.   Onset was more than 1 year ago.   Symptoms occur constantly.   Progression since onset: 3/10VAS in severity, fluctuates in severity.   Symptoms include cough, fatigue, myalgias, neck pain, numbness, sore throat and weakness.    Pertinent negative symptoms include no abdominal pain, no chest pain, no chills, no congestion, no diaphoresis, no fever, no headaches, no nausea, no dysuria and no vomiting.   Aggravating factors include nothing (cold weather).   Treatments tried include NSAIDs and oral narcotics (Norco 5/325, Lyrica 200 mg TID, Flexeril at HS, Elavil 150 mg HS).   Improvement on treatment was moderate.      PEG Assessment   What number best describes your pain on average in the past week?6  What number best describes how, during the past week, pain has interfered with your enjoyment of life?0  What number best describes how, during the past week, pain has interfered with your general activity?  7    The following portions of the patient's history were reviewed and updated as appropriate: allergies, current medications, past family history, past medical history, past social history, past surgical history, and problem list.    Review of Systems   Constitutional:  Positive for fatigue. Negative for activity change, chills, diaphoresis and fever.   HENT:  Positive for sore throat. Negative for congestion.    Eyes:  Negative for visual disturbance.   Respiratory:  Positive for cough. Negative for chest tightness and shortness of breath.    Cardiovascular:  Negative for chest pain.   Gastrointestinal:  Positive for  "constipation and diarrhea. Negative for abdominal pain, bowel incontinence, nausea and vomiting.   Genitourinary:  Negative for bladder incontinence, difficulty urinating, dyspareunia and dysuria.   Musculoskeletal:  Positive for back pain, myalgias and neck pain.   Neurological:  Positive for weakness and numbness. Negative for dizziness, light-headedness and headaches.   Psychiatric/Behavioral:  Positive for sleep disturbance. Negative for agitation, self-injury and suicidal ideas. The patient is not nervous/anxious.      --  The aforementioned information the Chief Complaint section and above subjective data including any HPI data, and also the Review of Systems data, has been personally reviewed and affirmed.  --    Vitals:    03/19/25 0914   BP: 159/90   Pulse: 87   Temp: 97.7 °F (36.5 °C)   Weight: 65.2 kg (143 lb 12.8 oz)   Height: 172.7 cm (68\")   PainSc: 3    PainLoc: Back     Objective   Physical Exam  Vitals and nursing note reviewed.   Constitutional:       Appearance: Normal appearance. She is well-developed.   Eyes:      General: Lids are normal.   Cardiovascular:      Rate and Rhythm: Normal rate.   Pulmonary:      Effort: Pulmonary effort is normal.   Musculoskeletal:      Lumbar back: Tenderness and bony tenderness present. Decreased range of motion.   Neurological:      Mental Status: She is alert and oriented to person, place, and time.      Motor: No weakness.      Gait: Gait normal.   Psychiatric:         Attention and Perception: Attention normal.         Mood and Affect: Mood normal.         Speech: Speech normal.         Behavior: Behavior normal.         Judgment: Judgment normal.       Assessment & Plan   Diagnoses and all orders for this visit:    1. Encounter for long-term use of opiate analgesic (Primary)    2. S/P laminectomy  -     HYDROcodone-acetaminophen (NORCO) 7.5-325 MG per tablet; Take 1 tablet by mouth Take As Directed. Take every 4 to 6 hours PRN  MAX 5/day  Dispense: 150 " tablet; Refill: 0  -     cyclobenzaprine (FLEXERIL) 10 MG tablet; Take 1 tablet by mouth 3 (Three) Times a Day As Needed for Muscle Spasms. for muscle spams  Dispense: 90 tablet; Refill: 0  -     Urine Drug Screen Confirmation - Urine, Clean Catch; Future  -     POC Urine Drug Screen, Triage    3. History of osteomyelitis  -     HYDROcodone-acetaminophen (NORCO) 7.5-325 MG per tablet; Take 1 tablet by mouth Take As Directed. Take every 4 to 6 hours PRN  MAX 5/day  Dispense: 150 tablet; Refill: 0  -     cyclobenzaprine (FLEXERIL) 10 MG tablet; Take 1 tablet by mouth 3 (Three) Times a Day As Needed for Muscle Spasms. for muscle spams  Dispense: 90 tablet; Refill: 0  -     Urine Drug Screen Confirmation - Urine, Clean Catch; Future  -     POC Urine Drug Screen, Triage    4. Spondylosis of lumbar region without myelopathy or radiculopathy  -     HYDROcodone-acetaminophen (NORCO) 7.5-325 MG per tablet; Take 1 tablet by mouth Take As Directed. Take every 4 to 6 hours PRN  MAX 5/day  Dispense: 150 tablet; Refill: 0  -     Urine Drug Screen Confirmation - Urine, Clean Catch; Future  -     POC Urine Drug Screen, Triage    5. Chronic pain syndrome  -     cyclobenzaprine (FLEXERIL) 10 MG tablet; Take 1 tablet by mouth 3 (Three) Times a Day As Needed for Muscle Spasms. for muscle spams  Dispense: 90 tablet; Refill: 0  -     Urine Drug Screen Confirmation - Urine, Clean Catch; Future  -     POC Urine Drug Screen, Triage    6. Peripheral polyneuropathy      Ana Maria Story reports a pain score of 3.  Given her pain assessment as noted, treatment options were discussed and the following options were decided upon as a follow-up plan to address the patient's pain: continuation of current treatment plan for pain.    --- Routine UDS in office today as part of monitoring requirements for controlled substances.  The specimen was viewed and the immunoassay result reviewed and is +OPI.  This specimen will be sent to [facility]  laboratory for confirmation.     --- CSA and consent to treat with controlled substances signed on 9/23/2024  --- Opioid Risk--Low  --- Refill Norco 7.5/325. Fill date 3/22/2025 applied. Patient appears stable with current regimen. No adverse effects. Regarding continuation of opioids, there is no evidence of aberrant behavior or any red flags.  The patient continues with appropriate response to opioid therapy. ADL's remain intact by self.   --- Refill Flexeril.   --- Continue Pregabalin, no refill needed.   --- Follow-up 2 months or sooner if needed.      POLLY REPORT  As part of the patient's treatment plan, I am prescribing controlled substances. The patient has been made aware of appropriate use of such medications, including potential risk of somnolence, limited ability to drive and/or work safely, and the potential for dependence or overdose. It has also been made clear that these medications are for use by this patient only, without concomitant use of alcohol or other substances unless prescribed.     Patient has completed prescribing agreement detailing terms of continued prescribing of controlled substances, including monitoring POLLY reports, urine drug screening, and pill counts if necessary. The patient is aware that inappropriate use will results in cessation of prescribing such medications.    As the clinician, I personally reviewed the POLLY from 3/19/2025 while the patient was in the office today.    History and physical exam exhibit continued safe and appropriate use of controlled substances.    Dictated utilizing Dragon dictation.

## 2025-04-18 DIAGNOSIS — M47.816 SPONDYLOSIS OF LUMBAR REGION WITHOUT MYELOPATHY OR RADICULOPATHY: ICD-10-CM

## 2025-04-18 DIAGNOSIS — Z98.890 S/P LAMINECTOMY: ICD-10-CM

## 2025-04-18 DIAGNOSIS — Z87.39 HISTORY OF OSTEOMYELITIS: ICD-10-CM

## 2025-04-18 DIAGNOSIS — G89.4 CHRONIC PAIN SYNDROME: ICD-10-CM

## 2025-04-18 RX ORDER — HYDROCODONE BITARTRATE AND ACETAMINOPHEN 7.5; 325 MG/1; MG/1
1 TABLET ORAL TAKE AS DIRECTED
Qty: 150 TABLET | Refills: 0 | Status: SHIPPED | OUTPATIENT
Start: 2025-04-18

## 2025-04-18 RX ORDER — PREGABALIN 200 MG/1
200 CAPSULE ORAL 3 TIMES DAILY
Qty: 90 CAPSULE | Refills: 1 | Status: SHIPPED | OUTPATIENT
Start: 2025-04-18

## 2025-04-18 RX ORDER — CYCLOBENZAPRINE HCL 10 MG
10 TABLET ORAL 3 TIMES DAILY PRN
Qty: 90 TABLET | Refills: 0 | Status: SHIPPED | OUTPATIENT
Start: 2025-04-18

## 2025-04-23 ENCOUNTER — TRANSCRIBE ORDERS (OUTPATIENT)
Dept: ADMINISTRATIVE | Facility: HOSPITAL | Age: 55
End: 2025-04-23
Payer: COMMERCIAL

## 2025-04-23 DIAGNOSIS — M20.11 ACQUIRED HALLUX VALGUS OF RIGHT FOOT: Primary | ICD-10-CM

## 2025-04-24 ENCOUNTER — TRANSCRIBE ORDERS (OUTPATIENT)
Dept: ADMINISTRATIVE | Facility: HOSPITAL | Age: 55
End: 2025-04-24
Payer: COMMERCIAL

## 2025-04-24 ENCOUNTER — LAB (OUTPATIENT)
Facility: HOSPITAL | Age: 55
End: 2025-04-24
Payer: COMMERCIAL

## 2025-04-24 ENCOUNTER — HOSPITAL ENCOUNTER (OUTPATIENT)
Facility: HOSPITAL | Age: 55
Discharge: HOME | End: 2025-04-24
Payer: COMMERCIAL

## 2025-04-24 DIAGNOSIS — Z12.31 OTHER SCREENING MAMMOGRAM: Primary | ICD-10-CM

## 2025-04-24 DIAGNOSIS — M20.11 ACQUIRED HALLUX VALGUS OF RIGHT FOOT: ICD-10-CM

## 2025-04-24 LAB
ANION GAP SERPL CALCULATED.3IONS-SCNC: 12 MMOL/L (ref 5–15)
BASOPHILS # BLD AUTO: 0.03 10*3/MM3 (ref 0–0.2)
BASOPHILS NFR BLD AUTO: 0.3 % (ref 0–1.5)
BUN SERPL-MCNC: 7 MG/DL (ref 6–20)
BUN/CREAT SERPL: 10.9 (ref 7–25)
CALCIUM SPEC-SCNC: 9.5 MG/DL (ref 8.6–10.5)
CHLORIDE SERPL-SCNC: 100 MMOL/L (ref 98–107)
CO2 SERPL-SCNC: 23 MMOL/L (ref 22–29)
CREAT SERPL-MCNC: 0.64 MG/DL (ref 0.57–1)
DEPRECATED RDW RBC AUTO: 41.1 FL (ref 37–54)
EGFRCR SERPLBLD CKD-EPI 2021: 106.5 ML/MIN/1.73
EOSINOPHIL # BLD AUTO: 0.22 10*3/MM3 (ref 0–0.4)
EOSINOPHIL NFR BLD AUTO: 2.5 % (ref 0.3–6.2)
ERYTHROCYTE [DISTWIDTH] IN BLOOD BY AUTOMATED COUNT: 12.7 % (ref 12.3–15.4)
GLUCOSE SERPL-MCNC: 86 MG/DL (ref 65–99)
HCT VFR BLD AUTO: 40.4 % (ref 34–46.6)
HGB BLD-MCNC: 13.2 G/DL (ref 12–15.9)
IMM GRANULOCYTES # BLD AUTO: 0.03 10*3/MM3 (ref 0–0.05)
IMM GRANULOCYTES NFR BLD AUTO: 0.3 % (ref 0–0.5)
LYMPHOCYTES # BLD AUTO: 2.35 10*3/MM3 (ref 0.7–3.1)
LYMPHOCYTES NFR BLD AUTO: 27 % (ref 19.6–45.3)
MCH RBC QN AUTO: 29.3 PG (ref 26.6–33)
MCHC RBC AUTO-ENTMCNC: 32.7 G/DL (ref 31.5–35.7)
MCV RBC AUTO: 89.6 FL (ref 79–97)
MONOCYTES # BLD AUTO: 0.88 10*3/MM3 (ref 0.1–0.9)
MONOCYTES NFR BLD AUTO: 10.1 % (ref 5–12)
NEUTROPHILS NFR BLD AUTO: 5.19 10*3/MM3 (ref 1.7–7)
NEUTROPHILS NFR BLD AUTO: 59.8 % (ref 42.7–76)
NRBC BLD AUTO-RTO: 0 /100 WBC (ref 0–0.2)
PLATELET # BLD AUTO: 300 10*3/MM3 (ref 140–450)
PMV BLD AUTO: 11.4 FL (ref 6–12)
POTASSIUM SERPL-SCNC: 4.9 MMOL/L (ref 3.5–5.2)
RBC # BLD AUTO: 4.51 10*6/MM3 (ref 3.77–5.28)
SODIUM SERPL-SCNC: 135 MMOL/L (ref 136–145)
WBC NRBC COR # BLD AUTO: 8.7 10*3/MM3 (ref 3.4–10.8)

## 2025-04-24 PROCEDURE — 80048 BASIC METABOLIC PNL TOTAL CA: CPT

## 2025-04-24 PROCEDURE — 85025 COMPLETE CBC W/AUTO DIFF WBC: CPT

## 2025-04-24 PROCEDURE — 93005 ELECTROCARDIOGRAM TRACING: CPT | Performed by: NURSE PRACTITIONER

## 2025-04-24 PROCEDURE — 36415 COLL VENOUS BLD VENIPUNCTURE: CPT

## 2025-04-25 LAB
QT INTERVAL: 366 MS
QTC INTERVAL: 404 MS

## 2025-05-19 ENCOUNTER — OFFICE VISIT (OUTPATIENT)
Dept: PAIN MEDICINE | Facility: CLINIC | Age: 55
End: 2025-05-19
Payer: COMMERCIAL

## 2025-05-19 VITALS
SYSTOLIC BLOOD PRESSURE: 138 MMHG | HEART RATE: 65 BPM | TEMPERATURE: 96 F | HEIGHT: 68 IN | BODY MASS INDEX: 21.86 KG/M2 | DIASTOLIC BLOOD PRESSURE: 88 MMHG | OXYGEN SATURATION: 95 %

## 2025-05-19 DIAGNOSIS — Z79.891 ENCOUNTER FOR LONG-TERM USE OF OPIATE ANALGESIC: ICD-10-CM

## 2025-05-19 DIAGNOSIS — M47.816 SPONDYLOSIS OF LUMBAR REGION WITHOUT MYELOPATHY OR RADICULOPATHY: ICD-10-CM

## 2025-05-19 DIAGNOSIS — M53.3 SACROILIAC JOINT DYSFUNCTION OF BOTH SIDES: ICD-10-CM

## 2025-05-19 DIAGNOSIS — Z98.890 S/P LAMINECTOMY: Primary | ICD-10-CM

## 2025-05-19 DIAGNOSIS — G62.9 PERIPHERAL POLYNEUROPATHY: ICD-10-CM

## 2025-05-19 PROCEDURE — 99214 OFFICE O/P EST MOD 30 MIN: CPT | Performed by: NURSE PRACTITIONER

## 2025-05-19 PROCEDURE — 1160F RVW MEDS BY RX/DR IN RCRD: CPT | Performed by: NURSE PRACTITIONER

## 2025-05-19 PROCEDURE — 1159F MED LIST DOCD IN RCRD: CPT | Performed by: NURSE PRACTITIONER

## 2025-05-19 PROCEDURE — 1125F AMNT PAIN NOTED PAIN PRSNT: CPT | Performed by: NURSE PRACTITIONER

## 2025-05-19 RX ORDER — HYDROCODONE BITARTRATE AND ACETAMINOPHEN 10; 325 MG/1; MG/1
1 TABLET ORAL EVERY 4 HOURS PRN
Qty: 45 TABLET | Refills: 0 | Status: SHIPPED | OUTPATIENT
Start: 2025-05-19 | End: 2025-05-23 | Stop reason: ALTCHOICE

## 2025-05-19 RX ORDER — LOSARTAN POTASSIUM 50 MG/1
50 TABLET ORAL DAILY
COMMUNITY
Start: 2025-04-24

## 2025-05-19 NOTE — PROGRESS NOTES
CHIEF COMPLAINT  Back pain and peripheral neuropathy. Patient is status post bunionectomy on 4/30/25. Patient states her back pain has increased since last visit.    Subjective   Ana Maria Story is a 54 y.o. female  who presents for follow-up.  She has a history of back pain and peripheral neuropathy.  She underwent a bunionectomy on 4/30/2025 and notes that her back pain has increased since her last office visit. She states that she is sitting more d/t her foot and this prolonged sitting is causing increased back and hip pain. She also notes that the weather changes may be contributing to her pain as well.     Today her pain is 6/10VAS in severity. She continues to utilize Norco 7.5/325 5/day, Flexeril 10 mg 3/day, Lyrica 200 mg 3/day, and OTC tylenol arthritis BID. This regimen is typically helpful, but with her recent surgery on her right foot her back pain has increased. She denies any side effects including somnolence or constipation. ADLs by self.     History of epidural abscesses and iliopsoas abscess diagnosed on 11/2/2021.  Patient underwent laminectomy of T6, T12, and L2 with evacuation of epidural abscess on 11/4/2021. Patient had bilateral transgluteal drainage of pelvic abscess on 11/5/2021.     She continues maintenance dose of oral antibiotics (Keflex 500 mg once daily). She is followed by ID yearly.      Previously did not tolerate baclofen (caused sleep walking), previously failed elavil for peripheral neuropathy.    Back Pain  Chronicity:  Chronic  Onset:  More than 1 year ago  Frequency:  Constantly  Progression since onset:  Unchanged (fluctuates in severity)  Pain location:  Lumbar spine and thoracic spine  Pain quality:  Aching  Pain-numeric:  6/10  Pain severity:  Moderate  Aggravated by:  Bending, position, standing and sitting (walking)  Associated symptoms: numbness (bilateral hand/arm, bilateral leg/feet, back) and weakness (bilateral leg/foot, occasional in bilateral hand)    Associated  symptoms: no abdominal pain, no bladder incontinence, no bowel incontinence, no chest pain, no dysuria, no fever and no headaches    Treatments tried:  Analgesics and muscle relaxant (SI injection, norco, flexeril)  Improvement on treatment:  Moderate  Peripheral Neuropathy  Symptoms are chronic.   Onset was more than 1 year ago.   Symptoms occur constantly.   Progression since onset: 6/10VAS in severity, fluctuates in severity.   Symptoms include fatigue, myalgias, neck pain, numbness (bilateral hand/arm, bilateral leg/feet, back), sore throat and weakness (bilateral leg/foot, occasional in bilateral hand).    Pertinent negative symptoms include no abdominal pain, no chest pain, no chills, no congestion, no cough, no diaphoresis, no fever, no headaches, no nausea, no dysuria and no vomiting.   Aggravating factors include nothing (cold weather).   Treatments tried include NSAIDs and oral narcotics (Norco 5/325, Lyrica 200 mg TID, Flexeril at HS, Elavil 150 mg HS).   Improvement on treatment was moderate.      PEG Assessment   What number best describes your pain on average in the past week?7  What number best describes how, during the past week, pain has interfered with your enjoyment of life?6  What number best describes how, during the past week, pain has interfered with your general activity?  6    The following portions of the patient's history were reviewed and updated as appropriate: allergies, current medications, past family history, past medical history, past social history, past surgical history, and problem list.    Review of Systems   Constitutional:  Positive for fatigue. Negative for chills, diaphoresis and fever.   HENT:  Positive for sore throat. Negative for congestion.    Respiratory:  Negative for cough and shortness of breath.    Cardiovascular:  Negative for chest pain.   Gastrointestinal:  Positive for constipation and diarrhea. Negative for abdominal pain, bowel incontinence, nausea and  "vomiting.   Genitourinary:  Negative for bladder incontinence, difficulty urinating and dysuria.   Musculoskeletal:  Positive for back pain, myalgias and neck pain.   Neurological:  Positive for weakness (bilateral leg/foot, occasional in bilateral hand) and numbness (bilateral hand/arm, bilateral leg/feet, back). Negative for dizziness, light-headedness and headaches.     --  The aforementioned information the Chief Complaint section and above subjective data including any HPI data, and also the Review of Systems data, has been personally reviewed and affirmed.  --    Vitals:    05/19/25 0920   BP: 138/88   BP Location: Left arm   Patient Position: Sitting   Pulse: 65   Temp: 96 °F (35.6 °C)   SpO2: 95%   Height: 172.7 cm (68\")   PainSc: 6      Objective   Physical Exam  Vitals and nursing note reviewed.   Constitutional:       Appearance: Normal appearance. She is well-developed.   Eyes:      General: Lids are normal.   Cardiovascular:      Rate and Rhythm: Normal rate.   Pulmonary:      Effort: Pulmonary effort is normal.   Musculoskeletal:      Lumbar back: Tenderness and bony tenderness present. Decreased range of motion.      Comments:   Right lower leg in surgical boot   Neurological:      Mental Status: She is alert and oriented to person, place, and time.      Gait: Gait abnormal (antalgic).   Psychiatric:         Attention and Perception: Attention normal.         Mood and Affect: Mood normal.         Speech: Speech normal.         Behavior: Behavior normal.         Judgment: Judgment normal.       Assessment & Plan   Diagnoses and all orders for this visit:    1. S/P laminectomy (Primary)  -     HYDROcodone-acetaminophen (NORCO)  MG per tablet; Take 1 tablet by mouth Every 4 (Four) Hours As Needed for Moderate Pain. Temporary supply  Dispense: 45 tablet; Refill: 0    2. Sacroiliac joint dysfunction of both sides  -     HYDROcodone-acetaminophen (NORCO)  MG per tablet; Take 1 tablet by mouth " Every 4 (Four) Hours As Needed for Moderate Pain. Temporary supply  Dispense: 45 tablet; Refill: 0    3. Spondylosis of lumbar region without myelopathy or radiculopathy  -     HYDROcodone-acetaminophen (NORCO)  MG per tablet; Take 1 tablet by mouth Every 4 (Four) Hours As Needed for Moderate Pain. Temporary supply  Dispense: 45 tablet; Refill: 0    4. Encounter for long-term use of opiate analgesic    5. Peripheral polyneuropathy      Ana Maria Story reports a pain score of 6.  Given her pain assessment as noted, treatment options were discussed and the following options were decided upon as a follow-up plan to address the patient's pain: prescription for opioid analgesics.    --- The urine drug screen confirmation from 3/19/2025 has been reviewed and the result is appropriate based on patient history and POLLY report  --- CSA and consent to treat with controlled substances signed on 9/23/2024  --- Opioid Risk--Low  --- Temporarily Increase Norco 10/325 every 4 hours max 5/day for the next 8-9 days d/t flared pain related to bunionectomy. We will then return to her [place] 7.5/325. Patient appears stable with current regimen. No adverse effects. Regarding continuation of opioids, there is no evidence of aberrant behavior or any red flags.  The patient continues with appropriate response to opioid therapy. ADL's remain intact by self.   --- Continue Lyrica, OTC Tylenol, and flexeril.   --- I recommend she request an even-up for left shoe to help offset the effect of the surgical boot to gait. She will contact her surgeon to see if they keep these in stock.   --- Follow-up 2 months or sooner if needed.      POLLY REPORT  As part of the patient's treatment plan, I am prescribing controlled substances. The patient has been made aware of appropriate use of such medications, including potential risk of somnolence, limited ability to drive and/or work safely, and the potential for dependence or overdose. It has  also been made clear that these medications are for use by this patient only, without concomitant use of alcohol or other substances unless prescribed.     Patient has completed prescribing agreement detailing terms of continued prescribing of controlled substances, including monitoring POLLY reports, urine drug screening, and pill counts if necessary. The patient is aware that inappropriate use will results in cessation of prescribing such medications.    As the clinician, I personally reviewed the POLLY from 5/19/2025 while the patient was in the office today.    History and physical exam exhibit continued safe and appropriate use of controlled substances.    Dictated utilizing Dragon dictation.

## 2025-05-23 DIAGNOSIS — M47.816 SPONDYLOSIS OF LUMBAR REGION WITHOUT MYELOPATHY OR RADICULOPATHY: ICD-10-CM

## 2025-05-23 DIAGNOSIS — Z98.890 S/P LAMINECTOMY: ICD-10-CM

## 2025-05-23 DIAGNOSIS — M53.3 SACROILIAC JOINT DYSFUNCTION OF BOTH SIDES: ICD-10-CM

## 2025-05-23 RX ORDER — HYDROCODONE BITARTRATE AND ACETAMINOPHEN 10; 325 MG/1; MG/1
1 TABLET ORAL EVERY 4 HOURS PRN
Qty: 45 TABLET | Refills: 0 | Status: CANCELLED | OUTPATIENT
Start: 2025-05-23

## 2025-05-23 RX ORDER — HYDROCODONE BITARTRATE AND ACETAMINOPHEN 7.5; 325 MG/1; MG/1
1 TABLET ORAL
Qty: 150 TABLET | Refills: 0 | Status: SHIPPED | OUTPATIENT
Start: 2025-05-23

## 2025-05-23 NOTE — TELEPHONE ENCOUNTER
Reviewed UDS and POLLY. Both updated and appropriate. Refill appropriate.      Covering for Evy Krish CHAVEZN

## 2025-06-11 ENCOUNTER — OFFICE VISIT (OUTPATIENT)
Dept: SLEEP MEDICINE | Facility: HOSPITAL | Age: 55
End: 2025-06-11
Payer: COMMERCIAL

## 2025-06-11 VITALS
OXYGEN SATURATION: 100 % | WEIGHT: 142 LBS | BODY MASS INDEX: 21.52 KG/M2 | SYSTOLIC BLOOD PRESSURE: 148 MMHG | DIASTOLIC BLOOD PRESSURE: 96 MMHG | HEIGHT: 68 IN | HEART RATE: 84 BPM

## 2025-06-11 DIAGNOSIS — G89.4 CHRONIC PAIN SYNDROME: ICD-10-CM

## 2025-06-11 DIAGNOSIS — F11.90 CHRONIC, CONTINUOUS USE OF OPIOIDS: ICD-10-CM

## 2025-06-11 DIAGNOSIS — G47.33 OSA (OBSTRUCTIVE SLEEP APNEA): Primary | ICD-10-CM

## 2025-06-11 DIAGNOSIS — G47.34 NOCTURNAL HYPOXEMIA: ICD-10-CM

## 2025-06-11 DIAGNOSIS — G47.10 HYPERSOMNIA: ICD-10-CM

## 2025-06-11 DIAGNOSIS — G62.9 PERIPHERAL POLYNEUROPATHY: ICD-10-CM

## 2025-06-11 PROBLEM — K21.9 ESOPHAGEAL REFLUX: Status: ACTIVE | Noted: 2021-12-13

## 2025-06-11 PROBLEM — K44.9 HIATAL HERNIA: Status: ACTIVE | Noted: 2021-12-13

## 2025-06-11 PROCEDURE — 1159F MED LIST DOCD IN RCRD: CPT | Performed by: INTERNAL MEDICINE

## 2025-06-11 PROCEDURE — 1160F RVW MEDS BY RX/DR IN RCRD: CPT | Performed by: INTERNAL MEDICINE

## 2025-06-11 PROCEDURE — G0463 HOSPITAL OUTPT CLINIC VISIT: HCPCS

## 2025-06-11 NOTE — PROGRESS NOTES
Sleep Consultation    Patient Name: Ana Maria Story  Age/Sex: 54 y.o. female  : 1970  MRN: [id number]    Date of Encounter Visit: 2025  Encounter Provider: Aida Padilla MD  Referring Provider: MIGUEL Porras  Place of Service: [facility]  Patient Care Team:  Ethel Lincoln APRN as PCP - General (Nurse Practitioner)    Subjective:     Reason for Consult: Obstructive sleep apnea    History of Present Illness:  Ana Maria Story is a 54 y.o. female is here for evaluation of CONRADO due to prior diagnosis.  Patient is sleep study more than 12 years ago at [facility], that was summarized below followed by a titration study and CPAP was initiated afterwards, she is not currently on any active treatment  She did use the CPAP for few years and felt much better on it but the machine stopped working 7 years ago and she was very busy with life at the time and never got the chance to get a new machine     Patient complains of daytime fatigue and sleepiness with an Felton Sleepiness Scale (ESS) of 15.  Patient complains of loud snoring in all position with witnessed apnea waking up with morning headache and with a sore dry mouth  Patient has typical restless leg syndrome with frequently jerking at night except the fact that the left leg is no better by movement  Difficulty staying asleep  Positive for nocturnal bruxism  Patient denies any cataplexy, sleep paralysis or other symptoms to suggest narcolepsy.  Patient denies any parasomnias.  Denies any history of seizure disorder or recent head trauma.  Patient spends adequate amount of time in bed with no evidence of sleep restriction or improper sleep hygiene.    Comorbidities include: Chronic pain medication on chronic opioid, polyneuropathy, obstructive sleep apnea,Nocturnal hypoxemia, hiatal hernia, acid reflux.     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    Extremity pain     Fibromyalgia, primary 2000    GERD (gastroesophageal reflux disease)     Hiatal hernia     Hypertension     IBS (irritable bowel syndrome)     Insomnia     Joint pain     Leg pain     Low back pain     Lumbosacral disc disease 2010    Osteoarthritis     Ovarian cyst     Peripheral neuropathy     Rheumatoid arthritis involving multiple sites 11/03/2021    Sleep apnea     Vitamin B12 deficiency     Vitamin D deficiency        Past Surgical History:   Procedure Laterality Date    BACK SURGERY      BUNIONECTOMY Right 04/30/2025    CARPAL TUNNEL RELEASE Bilateral     CHOLECYSTECTOMY      COLONOSCOPY      COLONOSCOPY N/A 06/30/2021    Procedure: COLONOSCOPY;  Surgeon: Lee Garcia MD;  Location: [facility] ENDOSCOPY;  Service: Gastroenterology;  Laterality: N/A;  COLON POLYPS    ENDOSCOPY      ENDOSCOPY N/A 06/30/2021    Procedure: ESOPHAGOGASTRODUODENOSCOPY;  Surgeon: Lee Garcia MD;  Location: [facility] ENDOSCOPY;  Service: Gastroenterology;  Laterality: N/A;  BARRETTS ESOPH/HIATAL HERNIA    EPIDURAL BLOCK      FOOT SURGERY Left     HYSTERECTOMY      OOPHORECTOMY Bilateral     SACROILIAC JOINT INJECTION Bilateral 04/08/2021    Procedure: SACROILIAC INJECTION;  Surgeon: Epifanio Plaza MD;  Location: [facility] [place] [facility] MAIN OR;  Service: Pain Management;  Laterality: Bilateral;    SACROILIAC JOINT INJECTION Bilateral 10/05/2021    Procedure: SACROILIAC INJECTION;  Surgeon: Epifanio Plaza MD;  Location: [facility] [place] MAIN OR;  Service: Pain Management;  Laterality: Bilateral;    TRIGGER POINT INJECTION      ULNAR NERVE TRANSPOSITION Bilateral        Home Medications:     Current Outpatient Medications:     atorvastatin (LIPITOR)  "40 MG tablet, , Disp: , Rfl:     B-D 3CC LUER-JOSEF SYR 25GX1\" 25G X 1\" 3 ML misc, USE AS DIRECTED FOR SELF INJECTIONS WEEKLY, Disp: , Rfl:     cephalexin (KEFLEX) 500 MG capsule, Take 1 capsule by mouth Daily., Disp: , Rfl:     cetirizine (zyrTEC) 10 MG tablet, Take 1 tablet by mouth Daily., Disp: , Rfl:     cyanocobalamin 1000 MCG/ML injection, 1 mL Every 7 (Seven) Days., Disp: , Rfl:     cyclobenzaprine (FLEXERIL) 10 MG tablet, Take 1 tablet by mouth 3 (Three) Times a Day As Needed for Muscle Spasms. for muscle spams, Disp: 90 tablet, Rfl: 0    Diclofenac Sodium (VOLTAREN) 1 % gel gel, APPLY 2 GRAMS TO THE AFFECTED AREA FOUR TIMES DAILY, Disp: , Rfl:     FREESTYLE LITE test strip, USE TO TEST BLOOD SUGAR EVERY MORNING AND AS NEEDED, Disp: , Rfl:     HYDROcodone-acetaminophen (NORCO) 7.5-325 MG per tablet, Take 1 tablet by mouth Every 4 (Four) to 6 (Six) Hours As Needed for Moderate Pain. MAX 5/Day. 30 DAY SUPPLY, Disp: 150 tablet, Rfl: 0    Lancets (freestyle) lancets, USE TO CHECK BLOOD SUGAR EVERY MORNING AND AS NEEDED, Disp: , Rfl:     losartan (COZAAR) 50 MG tablet, Take 1 tablet by mouth Daily., Disp: , Rfl:     meloxicam (MOBIC) 15 MG tablet, Take 1 tablet by mouth Daily., Disp: , Rfl:     metFORMIN (GLUCOPHAGE) 500 MG tablet, Take 1 tablet by mouth Every 12 (Twelve) Hours., Disp: , Rfl:     naloxone (NARCAN) 4 MG/0.1ML nasal spray, 1 spray into the nostril(s) as directed by provider As Needed for Respiratory Depression or Opioid Reversal., Disp: 2 each, Rfl: 0    omeprazole (priLOSEC) 20 MG capsule, Take 1 capsule by mouth Daily., Disp: , Rfl:     pregabalin (LYRICA) 200 MG capsule, Take 1 capsule by mouth 3 (Three) Times a Day., Disp: 90 capsule, Rfl: 1    vitamin D (ERGOCALCIFEROL) 1.25 MG (82178 UT) capsule capsule, Take 1 capsule by mouth Every 30 (Thirty) Days., Disp: , Rfl:     amoxicillin-clavulanate (AUGMENTIN) 875-125 MG per tablet, Take 1 tablet by mouth 2 (Two) Times a Day With Meals. (Patient not " taking: Reported on 6/11/2025), Disp: , Rfl:     Blood Glucose Monitoring Suppl (FreeStyle Lite) w/Device kit, USE TO TEST BLOOD SUGAR EVERY MORNING AND AS NEEDED, Disp: , Rfl:     Allergies:  Allergies   Allergen Reactions    Adhesive Tape Swelling and Rash       Past Social History:  Social History     Socioeconomic History    Marital status: Legally    Tobacco Use    Smoking status: Some Days     Current packs/day: 0.25     Average packs/day: 0.3 packs/day for 17.3 years (4.5 ttl pk-yrs)     Types: Cigarettes    Smokeless tobacco: Never    Tobacco comments:     I am only smoking 1-2 cigarettes a day   Vaping Use    Vaping status: Never Used   Substance and Sexual Activity    Alcohol use: Not Currently    Drug use: Never    Sexual activity: Not Currently     Partners: Male     Birth control/protection: Post-menopausal, Hysterectomy       Past Family History:  Family History   Problem Relation Age of Onset    Arthritis Mother     Diabetes Mother     Hypertension Mother     Coronary artery disease Mother     Restless legs syndrome Mother     Insomnia Mother     Restless legs syndrome Father     Breast cancer Sister     Ovarian cysts Sister     Insomnia Sister     Cancer Sister     Depression Sister     Cancer Sister     Depression Sister     Diabetes Brother         Type 1    Depression Brother     Insomnia Brother     Coronary artery disease Maternal Aunt     Coronary artery disease Maternal Aunt     Depression Maternal Uncle     Depression Maternal Uncle     Arthritis Maternal Grandmother     Diabetes Maternal Grandmother     Hypertension Maternal Grandmother     Arthritis Maternal Grandfather     Breast cancer Paternal Grandmother     Hypertension Daughter     Sleep apnea Daughter     Restless legs syndrome Daughter     Insomnia Daughter     Depression Son     Insomnia Son     Depression Son     Malig Hyperthermia Neg Hx         Objective:        Vital Signs:   Visit Vitals  /96   Pulse 84   Ht  "172.7 cm (68\")   Wt 64.4 kg (142 lb)   SpO2 100%   BMI 21.59 kg/m²     Wt Readings from Last 3 Encounters:   06/11/25 64.4 kg (142 lb)   03/19/25 65.2 kg (143 lb 12.8 oz)   01/20/25 64.4 kg (142 lb)     Neck Circumference: 12 inches    Physical Exam:   GEN:  No acute distress, alert, cooperative, well developed   EYES:   Sclerae clear. No icterus. PERRL. Normal EOM  ENT:   External ears/nose normal, no oral lesions, no thrush, mucous membranes moist, Septum midline. Mallampati IV airway. Redundant membranous soft palate    NECK:  Supple, midline trachea, no JVD  LUNGS: Normal chest on inspection, CTAB, no wheezes. No rhonchi. No crackles. Respirations regular, even and unlabored.   CV:  Regular rhythm and rate. Normal S1/S2. No murmurs, gallops, or rubs noted.  ABD:  Soft, nontender and nondistended. Normal bowel sounds. No guarding  EXT:  Moves all extremities well. No cyanosis. No redness. No edema.   Skin: Dry, intact, no bleeding      Diagnostic Data:  In lab polysomnography diagnostic test 3/19/2014:  Body weight was 150 pounds  Sleep efficiency was 86.8%, stage I 0.7%, stage II 71.2%, stage III 19.6%, stage REM 8.4%  Patient has a cluster of significant desaturation with severe worsening REM component  Overall AHI was 15.3 with RDI of 27.4 but REM AHI was very high at 55.8  No significant parotic leg movement disorder  Positive hypoxemia with a camille desaturation down to 70% with 1.9% of total sleep time in the hypoxemic range    Lab CPAP titration 4/9/2014: Body weight on the night of the study was 150 pounds  Patient was started on CPAP additional pressure of 5 that was started although up to a pressure of 17.  The AHI was elevated until the patient had a pressure of 17 or above 167 minutes of sleep time including 28.4 minutes of REM sleep with residual AHI of 1.1 with no more hypoxemia.    Assessment and Plan:       ICD-10-CM ICD-9-CM   1. CONRADO (obstructive sleep apnea)  G47.33 327.23   2. Nocturnal " hypoxemia  G47.34 327.24   3. Chronic pain syndrome  G89.4 338.4   4. Peripheral polyneuropathy  G62.9 356.9   5. Hypersomnia  G47.10 780.54   6. Chronic, continuous use of opioids  F11.90 305.51       Recommendations:     Patient had confirmed diagnosis of sleep apnea which was moderate with severe REM component with significant hypoxemia and treatment is strongly recommended specially now that the patient is on chronic pain medication including chronic opioid  Patient felt great on the CPAP and is eager to go back on it, the only reason she is not on the treatment is because she was very busy taking care of grandkids at the time when her old CPAP machine stopped working  Her weight is very similar to slightly lower now than what it was before and no reevaluation or repeat sleep study is needed  She did require a high pressure based on her titration study with recommended pressure of 17, I will order an auto CPAP 16-20 cm H2O  Will check an overnight oximetry on the newer CPAP and consider further recommendation depending on the information from the oximetry and the compliance download on the next visit  The records from her previous sleep evaluation were requested, reviewed, and summarized, and discussed with the patient    Orders Placed This Encounter   Procedures    PAP Therapy    Overnight Sleep Oximetry Study     No orders of the defined types were placed in this encounter.     Return in about 2 months (around 8/11/2025).    Aida Padilla MD   [facility]   06/11/25  11:22 EDT    Dictated utilizing Dragon dictation

## 2025-06-28 DIAGNOSIS — M47.816 SPONDYLOSIS OF LUMBAR REGION WITHOUT MYELOPATHY OR RADICULOPATHY: ICD-10-CM

## 2025-06-28 DIAGNOSIS — Z98.890 S/P LAMINECTOMY: ICD-10-CM

## 2025-06-28 DIAGNOSIS — M53.3 SACROILIAC JOINT DYSFUNCTION OF BOTH SIDES: ICD-10-CM

## 2025-06-28 DIAGNOSIS — Z87.39 HISTORY OF OSTEOMYELITIS: ICD-10-CM

## 2025-06-28 DIAGNOSIS — G89.4 CHRONIC PAIN SYNDROME: ICD-10-CM

## 2025-06-30 RX ORDER — PREGABALIN 200 MG/1
200 CAPSULE ORAL 3 TIMES DAILY
Qty: 90 CAPSULE | Refills: 1 | Status: SHIPPED | OUTPATIENT
Start: 2025-06-30

## 2025-06-30 RX ORDER — HYDROCODONE BITARTRATE AND ACETAMINOPHEN 7.5; 325 MG/1; MG/1
1 TABLET ORAL
Qty: 150 TABLET | Refills: 0 | Status: SHIPPED | OUTPATIENT
Start: 2025-06-30

## 2025-06-30 RX ORDER — CYCLOBENZAPRINE HCL 10 MG
10 TABLET ORAL 3 TIMES DAILY PRN
Qty: 90 TABLET | Refills: 0 | Status: SHIPPED | OUTPATIENT
Start: 2025-06-30

## 2025-07-18 ENCOUNTER — OFFICE VISIT (OUTPATIENT)
Dept: PAIN MEDICINE | Facility: CLINIC | Age: 55
End: 2025-07-18
Payer: COMMERCIAL

## 2025-07-18 VITALS
WEIGHT: 143 LBS | HEART RATE: 73 BPM | HEIGHT: 68 IN | BODY MASS INDEX: 21.67 KG/M2 | SYSTOLIC BLOOD PRESSURE: 156 MMHG | TEMPERATURE: 97.5 F | DIASTOLIC BLOOD PRESSURE: 90 MMHG

## 2025-07-18 DIAGNOSIS — Z98.890 S/P LAMINECTOMY: ICD-10-CM

## 2025-07-18 DIAGNOSIS — G89.4 CHRONIC PAIN SYNDROME: ICD-10-CM

## 2025-07-18 DIAGNOSIS — M47.816 SPONDYLOSIS OF LUMBAR REGION WITHOUT MYELOPATHY OR RADICULOPATHY: ICD-10-CM

## 2025-07-18 DIAGNOSIS — Z87.39 HISTORY OF OSTEOMYELITIS: ICD-10-CM

## 2025-07-18 DIAGNOSIS — M53.3 SACROILIAC JOINT DYSFUNCTION OF BOTH SIDES: ICD-10-CM

## 2025-07-18 PROCEDURE — 1160F RVW MEDS BY RX/DR IN RCRD: CPT | Performed by: NURSE PRACTITIONER

## 2025-07-18 PROCEDURE — 1159F MED LIST DOCD IN RCRD: CPT | Performed by: NURSE PRACTITIONER

## 2025-07-18 PROCEDURE — 99214 OFFICE O/P EST MOD 30 MIN: CPT | Performed by: NURSE PRACTITIONER

## 2025-07-18 PROCEDURE — 1125F AMNT PAIN NOTED PAIN PRSNT: CPT | Performed by: NURSE PRACTITIONER

## 2025-07-18 RX ORDER — HYDROCODONE BITARTRATE AND ACETAMINOPHEN 7.5; 325 MG/1; MG/1
1 TABLET ORAL
Qty: 150 TABLET | Refills: 0 | Status: SHIPPED | OUTPATIENT
Start: 2025-07-18

## 2025-07-18 RX ORDER — CYCLOBENZAPRINE HCL 10 MG
10 TABLET ORAL 3 TIMES DAILY PRN
Qty: 90 TABLET | Refills: 0 | Status: SHIPPED | OUTPATIENT
Start: 2025-07-18

## 2025-08-13 ENCOUNTER — OFFICE VISIT (OUTPATIENT)
Dept: SLEEP MEDICINE | Facility: HOSPITAL | Age: 55
End: 2025-08-13
Payer: COMMERCIAL

## 2025-08-13 VITALS
HEIGHT: 68 IN | WEIGHT: 140 LBS | BODY MASS INDEX: 21.22 KG/M2 | OXYGEN SATURATION: 100 % | DIASTOLIC BLOOD PRESSURE: 85 MMHG | SYSTOLIC BLOOD PRESSURE: 124 MMHG | HEART RATE: 80 BPM

## 2025-08-13 DIAGNOSIS — G47.10 HYPERSOMNIA: ICD-10-CM

## 2025-08-13 DIAGNOSIS — F11.90 CHRONIC, CONTINUOUS USE OF OPIOIDS: ICD-10-CM

## 2025-08-13 DIAGNOSIS — G47.34 NOCTURNAL HYPOXEMIA: ICD-10-CM

## 2025-08-13 DIAGNOSIS — G47.33 OSA ON CPAP: Primary | ICD-10-CM

## 2025-08-13 PROCEDURE — G0463 HOSPITAL OUTPT CLINIC VISIT: HCPCS

## 2025-08-13 RX ORDER — AMLODIPINE BESYLATE 5 MG/1
1 TABLET ORAL DAILY
COMMUNITY
Start: 2025-08-07

## 2025-08-24 DIAGNOSIS — M53.3 SACROILIAC JOINT DYSFUNCTION OF BOTH SIDES: ICD-10-CM

## 2025-08-24 DIAGNOSIS — M47.816 SPONDYLOSIS OF LUMBAR REGION WITHOUT MYELOPATHY OR RADICULOPATHY: ICD-10-CM

## 2025-08-24 DIAGNOSIS — G89.4 CHRONIC PAIN SYNDROME: ICD-10-CM

## 2025-08-24 DIAGNOSIS — Z98.890 S/P LAMINECTOMY: ICD-10-CM

## 2025-08-26 RX ORDER — PREGABALIN 200 MG/1
200 CAPSULE ORAL 3 TIMES DAILY
Qty: 90 CAPSULE | Refills: 1 | Status: SHIPPED | OUTPATIENT
Start: 2025-08-26

## 2025-08-26 RX ORDER — HYDROCODONE BITARTRATE AND ACETAMINOPHEN 7.5; 325 MG/1; MG/1
1 TABLET ORAL
Qty: 150 TABLET | Refills: 0 | Status: SHIPPED | OUTPATIENT
Start: 2025-08-26

## (undated) DEVICE — EPIDURAL TRAY: Brand: MEDLINE INDUSTRIES, INC.

## (undated) DEVICE — NDL SPINE 22G 31/2IN BLK

## (undated) DEVICE — Device: Brand: DEFENDO AIR/WATER/SUCTION AND BIOPSY VALVE

## (undated) DEVICE — NDL SPINE 25G 31/2IN BLU

## (undated) DEVICE — COLON KIT: Brand: MEDLINE INDUSTRIES, INC.

## (undated) DEVICE — EGD OR ERCP KIT: Brand: MEDLINE INDUSTRIES, INC.

## (undated) DEVICE — GLV SURG TRIUMPH PF LTX 7.5 STRL

## (undated) DEVICE — SYR LUERLOK 5CC

## (undated) DEVICE — SOL IRRG H2O PL/BG 1000ML STRL

## (undated) DEVICE — SYR LL 3CC